# Patient Record
Sex: MALE | Race: WHITE | NOT HISPANIC OR LATINO | Employment: FULL TIME | URBAN - METROPOLITAN AREA
[De-identification: names, ages, dates, MRNs, and addresses within clinical notes are randomized per-mention and may not be internally consistent; named-entity substitution may affect disease eponyms.]

---

## 2017-01-03 ENCOUNTER — TELEPHONE (OUTPATIENT)
Dept: CARDIOLOGY | Facility: CLINIC | Age: 62
End: 2017-01-03

## 2017-01-03 ENCOUNTER — OFFICE VISIT (OUTPATIENT)
Dept: CARDIOLOGY | Facility: CLINIC | Age: 62
End: 2017-01-03
Payer: COMMERCIAL

## 2017-01-03 VITALS
HEART RATE: 70 BPM | BODY MASS INDEX: 32.28 KG/M2 | OXYGEN SATURATION: 96 % | WEIGHT: 238.31 LBS | HEIGHT: 72 IN | DIASTOLIC BLOOD PRESSURE: 68 MMHG | SYSTOLIC BLOOD PRESSURE: 123 MMHG

## 2017-01-03 DIAGNOSIS — I25.10 CORONARY ARTERY DISEASE INVOLVING NATIVE CORONARY ARTERY OF NATIVE HEART WITHOUT ANGINA PECTORIS: Primary | ICD-10-CM

## 2017-01-03 DIAGNOSIS — E66.9 DIABETES MELLITUS TYPE 2 IN OBESE: ICD-10-CM

## 2017-01-03 DIAGNOSIS — E11.69 DIABETES MELLITUS TYPE 2 IN OBESE: ICD-10-CM

## 2017-01-03 DIAGNOSIS — I10 ESSENTIAL HYPERTENSION: ICD-10-CM

## 2017-01-03 DIAGNOSIS — R07.89 CHEST PAIN, ATYPICAL: ICD-10-CM

## 2017-01-03 PROCEDURE — 3074F SYST BP LT 130 MM HG: CPT | Mod: S$GLB,,, | Performed by: INTERNAL MEDICINE

## 2017-01-03 PROCEDURE — 3078F DIAST BP <80 MM HG: CPT | Mod: S$GLB,,, | Performed by: INTERNAL MEDICINE

## 2017-01-03 PROCEDURE — 99999 PR PBB SHADOW E&M-EST. PATIENT-LVL IV: CPT | Mod: PBBFAC,,, | Performed by: INTERNAL MEDICINE

## 2017-01-03 PROCEDURE — 99214 OFFICE O/P EST MOD 30 MIN: CPT | Mod: S$GLB,,, | Performed by: INTERNAL MEDICINE

## 2017-01-03 PROCEDURE — 4010F ACE/ARB THERAPY RXD/TAKEN: CPT | Mod: S$GLB,,, | Performed by: INTERNAL MEDICINE

## 2017-01-03 PROCEDURE — 1159F MED LIST DOCD IN RCRD: CPT | Mod: S$GLB,,, | Performed by: INTERNAL MEDICINE

## 2017-01-03 PROCEDURE — 3044F HG A1C LEVEL LT 7.0%: CPT | Mod: S$GLB,,, | Performed by: INTERNAL MEDICINE

## 2017-01-03 NOTE — PROGRESS NOTES
Subjective:    Patient ID:  Edel Edwards III is a 61 y.o. male who presents for follow-up of Hospital Follow Up      HPI     Recently admitted  HTN, DM, CAD - TESSA to LAD 12/20/16 12/20/16 [facility] - EDP 17, no LV, LAD tandem proximal 90% lesions, Cx distal 70%, RCA medium with diffuse mid 80%        Description of the findings of the procedure: calcified, fibrotic 90%prox LAD --> 0% by predil with 3.5 angioscore and NC balloon. Placement of a 3.5 x 30 mm TESSA.      Stress test 12/13/16  LVEF: 53 %  Impression: ABNORMAL MYOCARDIAL PERFUSION  1. There is evidence for a moderate to large sized area of moderate myocardial ischemia that extends from the base to the apical inferior wall of the left ventricle with underlying injury present.   2. There is abnormal wall motion at rest showing moderate hypokinesis of the inferobasilar wall of the left ventricle.   3. Resting LV function is normal.     Denies CP or SOB  Having some fatigue        Review of Systems   Constitution: Negative for decreased appetite.   HENT: Negative for ear discharge.    Eyes: Negative for blurred vision.   Endocrine: Negative for polyphagia.   Skin: Negative for nail changes.   Neurological: Negative for aphonia.   Psychiatric/Behavioral: Negative for hallucinations.        Objective:    Physical Exam   Constitutional: He is oriented to person, place, and time. He appears well-developed and well-nourished.   HENT:   Head: Normocephalic and atraumatic.   Eyes: Conjunctivae are normal. Pupils are equal, round, and reactive to light.   Neck: Normal range of motion. Neck supple.   Cardiovascular: Normal rate, normal heart sounds and intact distal pulses.    Pulmonary/Chest: Effort normal and breath sounds normal.   Abdominal: Soft. Bowel sounds are normal.   Musculoskeletal: Normal range of motion.   Neurological: He is alert and oriented to person, place, and time.   Skin: Skin is warm and dry.         Assessment:       1. Coronary artery disease  involving native coronary artery of native heart without angina pectoris    2. Diabetes mellitus type 2 in obese    3. Chest pain, atypical    4. Essential hypertension         Plan:       Cardiac rehab  OV 3 months

## 2017-01-03 NOTE — MR AVS SNAPSHOT
[facility]  [address] [place] [address]  Phone: [phone]                  Edel Edwards III   1/3/2017 8:45 AM   Office Visit    Description:  Male : 1955   Provider:  Teja Eaton MD   Department:  [facility]           Reason for Visit     Hospital Follow Up           Diagnoses this Visit        Comments    Coronary artery disease involving native coronary artery of native heart without angina pectoris    -  Primary     Diabetes mellitus type 2 in obese         Chest pain, atypical         Essential hypertension                To Do List           Future Appointments        Provider Department Dept Phone    2017 8:30 AM Henrique Sanchez OD [place] [facility] [phone]    2017 9:00 AM Henrique Sanchez OD [place] [facility] [phone]    2017 3:00 PM FELIBERTO Steiner MD [facility] [phone]      Goals (5 Years of Data)     None      [facility]     [facility] On Call Nurse Care Line -  Assistance  Registered nurses in the [facility] provide clinical advisement, health education, appointment booking, and other advisory services.  Call for this free service at [phone].             Medications           Message regarding Medications     Verify the changes and/or additions to your medication regime listed below are the same as discussed with your clinician today.  If any of these changes or additions are incorrect, please notify your healthcare provider.             Verify that the below list of medications is an accurate representation of the medications you are currently taking.  If none reported, the list may be blank. If incorrect, please contact your healthcare provider. Carry this list with you in case of emergency.           Current Medications     ACCU-CHEK SMARTVIEW TEST STRIP Strp     aspirin (ECOTRIN) 81 MG EC tablet Take 1 tablet (81 mg total) by mouth once daily.    atorvastatin  (LIPITOR) 40 MG tablet take 1/2 tablet by mouth once daily    canagliflozin (INVOKANA) 300 mg Tab Take 300 mg by mouth every morning.     clopidogrel (PLAVIX) 75 mg tablet Take 1 tablet (75 mg total) by mouth once daily. Take 8 pills on the first day    clotrimazole-betamethasone 1-0.05% (LOTRISONE) cream Apply topically 2 (two) times daily.    etodolac (LODINE) 500 MG tablet Take 500 mg by mouth 2 (two) times daily.     exenatide microspheres (BYDUREON) 2 mg SSRR Inject 2 mg into the skin every 7 days. On Wednesdays PM.    FLUARIX QUAD 9669-6525, PF, 60 mcg (15 mcg x 4)/0.5 mL Syrg inject 0.5 milliliter intramuscularly    FOLIC ACID/MV,FE,MIN (CENTRUM ORAL) Take 1 tablet by mouth every morning.     gabapentin (NEURONTIN) 100 MG capsule Take 400 mg by mouth every evening.     GLUC/CHND/OM3/DHA/EPA/FISH/STR (GLUCOSAMINE CHONDROITIN PLUS ORAL) Take 1 tablet by mouth 2 (two) times daily.    hydrocodone-acetaminophen  mg Tab Take 1 tablet by mouth once as needed.     isosorbide mononitrate (IMDUR) 30 MG 24 hr tablet Take 1 tablet (30 mg total) by mouth once daily.    loratadine-pseudoephedrine 5-120 mg (CLARITIN-D 12-HOUR) 5-120 mg per tablet Take 1 tablet by mouth 2 (two) times daily.    metformin (GLUCOPHAGE) 1000 MG tablet Take 1,000 mg by mouth 2 (two) times daily with meals.      metoprolol succinate (TOPROL-XL) 25 MG 24 hr tablet Take 1 tablet (25 mg total) by mouth once daily.    omeprazole (PRILOSEC OTC) 20 MG tablet Take 20 mg by mouth every evening.     PNEUMOVAX 23 25 mcg/0.5 mL Syrg inject 0.5 milliliter intramuscularly    tamsulosin (FLOMAX) 0.4 mg Cp24 Take 0.4 mg by mouth every evening.    terazosin (HYTRIN) 2 MG capsule Take 2 mg by mouth every evening.    valsartan (DIOVAN) 320 MG tablet Take 320 mg by mouth every morning.            Clinical Reference Information           Vital Signs - Last Recorded  Most recent update: 1/3/2017  8:55 AM by Charlotte Griggs MA    BP Pulse Ht Wt SpO2 BMI    123/68  (BP Location: Left arm, Patient Position: Sitting, BP Method: Automatic) 70 6' (1.829 m) 108.1 kg (238 lb 5.1 oz) 96% 32.32 kg/m2      Blood Pressure          Most Recent Value    BP  123/68      Allergies as of 1/3/2017     No Known Allergies      Immunizations Administered on Date of Encounter - 1/3/2017     None

## 2017-01-06 DIAGNOSIS — R52 PAIN: Primary | ICD-10-CM

## 2017-01-06 RX ORDER — ETODOLAC 500 MG/1
500 TABLET, FILM COATED ORAL 2 TIMES DAILY
Qty: 30 TABLET | Refills: 0 | Status: SHIPPED | OUTPATIENT
Start: 2017-01-06 | End: 2017-01-11 | Stop reason: SDUPTHER

## 2017-01-06 NOTE — TELEPHONE ENCOUNTER
----- Message from Maura Barrientos sent at 1/6/2017  8:13 AM CST -----  Contact: self  Pt needs etodolac (LODINE) 500 MG tablet refilled. Please call [phone]. thanks

## 2017-01-11 ENCOUNTER — OFFICE VISIT (OUTPATIENT)
Dept: FAMILY MEDICINE | Facility: CLINIC | Age: 62
End: 2017-01-11
Payer: COMMERCIAL

## 2017-01-11 VITALS
OXYGEN SATURATION: 95 % | WEIGHT: 238.31 LBS | SYSTOLIC BLOOD PRESSURE: 122 MMHG | TEMPERATURE: 98 F | BODY MASS INDEX: 32.28 KG/M2 | HEART RATE: 64 BPM | HEIGHT: 72 IN | DIASTOLIC BLOOD PRESSURE: 70 MMHG | RESPIRATION RATE: 12 BRPM

## 2017-01-11 DIAGNOSIS — I10 ESSENTIAL HYPERTENSION: Primary | ICD-10-CM

## 2017-01-11 DIAGNOSIS — J01.10 ACUTE NON-RECURRENT FRONTAL SINUSITIS: ICD-10-CM

## 2017-01-11 DIAGNOSIS — E11.9 TYPE 2 DIABETES MELLITUS WITHOUT RETINOPATHY: ICD-10-CM

## 2017-01-11 DIAGNOSIS — I25.10 CORONARY ARTERY DISEASE INVOLVING NATIVE CORONARY ARTERY OF NATIVE HEART WITHOUT ANGINA PECTORIS: ICD-10-CM

## 2017-01-11 DIAGNOSIS — R52 PAIN: ICD-10-CM

## 2017-01-11 PROCEDURE — 3078F DIAST BP <80 MM HG: CPT | Mod: S$GLB,,, | Performed by: FAMILY MEDICINE

## 2017-01-11 PROCEDURE — 1159F MED LIST DOCD IN RCRD: CPT | Mod: S$GLB,,, | Performed by: FAMILY MEDICINE

## 2017-01-11 PROCEDURE — 4010F ACE/ARB THERAPY RXD/TAKEN: CPT | Mod: S$GLB,,, | Performed by: FAMILY MEDICINE

## 2017-01-11 PROCEDURE — 3044F HG A1C LEVEL LT 7.0%: CPT | Mod: S$GLB,,, | Performed by: FAMILY MEDICINE

## 2017-01-11 PROCEDURE — 3074F SYST BP LT 130 MM HG: CPT | Mod: S$GLB,,, | Performed by: FAMILY MEDICINE

## 2017-01-11 PROCEDURE — 99999 PR PBB SHADOW E&M-EST. PATIENT-LVL III: CPT | Mod: PBBFAC,,, | Performed by: FAMILY MEDICINE

## 2017-01-11 PROCEDURE — 99214 OFFICE O/P EST MOD 30 MIN: CPT | Mod: S$GLB,,, | Performed by: FAMILY MEDICINE

## 2017-01-11 RX ORDER — ETODOLAC 500 MG/1
500 TABLET, FILM COATED ORAL 2 TIMES DAILY
Qty: 60 TABLET | Refills: 4 | Status: SHIPPED | OUTPATIENT
Start: 2017-01-11 | End: 2017-05-05 | Stop reason: SDUPTHER

## 2017-01-11 RX ORDER — ETODOLAC 500 MG/1
500 TABLET, FILM COATED ORAL 2 TIMES DAILY
Qty: 60 TABLET | Refills: 2 | Status: SHIPPED | OUTPATIENT
Start: 2017-01-11 | End: 2017-01-11 | Stop reason: SDUPTHER

## 2017-01-11 RX ORDER — AZITHROMYCIN 250 MG/1
TABLET, FILM COATED ORAL
Qty: 6 TABLET | Refills: 0 | Status: SHIPPED | OUTPATIENT
Start: 2017-01-11 | End: 2017-01-11

## 2017-01-11 NOTE — LETTER
January 11, 2017      Edel Edwards III  [address] [facility] [place] [address]             Levine, Susan. \[facility].\""  [address] [place] [address]  Phone: [phone]  Fax: [phone] Mr. Edwards    Was treated here on 01/11/2017    May Return to work/school on 01/12/2017.    No Restrictions              Chandana Varela MD

## 2017-01-11 NOTE — PROGRESS NOTES
Chief Complaint   Patient presents with    Sinus Problem     it comes and goes / wife has a sinus       HPI  Edel Edwards III is a 61 y.o. male with multiple medical diagnoses as listed in the medical history and problem list that presents for URI.    URI - Meds: OTC dayquil/nyquil, nasal steroid, 1.5 week hx, states prog worsening then woke up this am improved.     CAD - recent cath multiple stents placed, Cardiology. Overall states no change in symptoms, feels well.     DM2 - states well controlled at this time    Pt is known to me and was last seen by me on 11/28/2016.    PAST MEDICAL HISTORY:  Past Medical History   Diagnosis Date    Anticoagulant long-term use      Plavix and Aspirin    BMI 33.0-33.9,adult     Cataract     Diabetes mellitus type 2 in obese     HTN (hypertension)     Hyperlipidemia     Sciatica        PAST SURGICAL HISTORY:  Past Surgical History   Procedure Laterality Date    None      Joint replacement Left 11/19/2015       SOCIAL HISTORY:  Social History     Social History    Marital status:      Spouse name: N/A    Number of children: N/A    Years of education: N/A     Occupational History    Not on file.     Social History Main Topics    Smoking status: Former Smoker     Quit date: 6/5/2009    Smokeless tobacco: Not on file      Comment: quit 2010    Alcohol use No    Drug use: No    Sexual activity: Yes     Partners: Female     Other Topics Concern    Not on file     Social History Narrative       FAMILY HISTORY:  Family History   Problem Relation Age of Onset    Hypertension Mother     Stroke Father     No Known Problems Sister     No Known Problems Sister     Diabetes Maternal Aunt     Diabetes Paternal Grandmother     No Known Problems Brother     No Known Problems Maternal Uncle     No Known Problems Paternal Aunt     No Known Problems Paternal Uncle     No Known Problems Maternal Grandmother     No Known Problems Maternal Grandfather      No Known Problems Paternal Grandfather     Amblyopia Neg Hx     Blindness Neg Hx     Cancer Neg Hx     Cataracts Neg Hx     Glaucoma Neg Hx     Macular degeneration Neg Hx     Retinal detachment Neg Hx     Strabismus Neg Hx     Thyroid disease Neg Hx        ALLERGIES AND MEDICATIONS: updated and reviewed.  Review of patient's allergies indicates:  No Known Allergies  Current Outpatient Prescriptions   Medication Sig Dispense Refill    ACCU-CHEK SMARTVIEW TEST STRIP Strp   1    aspirin (ECOTRIN) 81 MG EC tablet Take 1 tablet (81 mg total) by mouth once daily.  0    atorvastatin (LIPITOR) 40 MG tablet take 1/2 tablet by mouth once daily 30 tablet 0    canagliflozin (INVOKANA) 300 mg Tab Take 300 mg by mouth every morning.       clopidogrel (PLAVIX) 75 mg tablet Take 1 tablet (75 mg total) by mouth once daily. Take 8 pills on the first day (Patient taking differently: Take 75 mg by mouth every morning. Take 8 pills on the first day) 38 tablet 11    clotrimazole-betamethasone 1-0.05% (LOTRISONE) cream Apply topically 2 (two) times daily. 15 g 5    etodolac (LODINE) 500 MG tablet Take 1 tablet (500 mg total) by mouth 2 (two) times daily. 60 tablet 4    exenatide microspheres (BYDUREON) 2 mg SSRR Inject 2 mg into the skin every 7 days. On Wednesdays PM.      FLUARIX QUAD 3035-6609, PF, 60 mcg (15 mcg x 4)/0.5 mL Syrg inject 0.5 milliliter intramuscularly  0    FOLIC ACID/MV,FE,MIN (CENTRUM ORAL) Take 1 tablet by mouth every morning.       gabapentin (NEURONTIN) 100 MG capsule Take 400 mg by mouth every evening.       GLUC/CHND/OM3/DHA/EPA/FISH/STR (GLUCOSAMINE CHONDROITIN PLUS ORAL) Take 1 tablet by mouth 2 (two) times daily.      hydrocodone-acetaminophen  mg Tab Take 1 tablet by mouth once as needed.   0    isosorbide mononitrate (IMDUR) 30 MG 24 hr tablet Take 1 tablet (30 mg total) by mouth once daily. (Patient taking differently: Take 30 mg by mouth every morning. ) 30 tablet 11     loratadine-pseudoephedrine 5-120 mg (CLARITIN-D 12-HOUR) 5-120 mg per tablet Take 1 tablet by mouth 2 (two) times daily.      metformin (GLUCOPHAGE) 1000 MG tablet Take 1,000 mg by mouth 2 (two) times daily with meals.        metoprolol succinate (TOPROL-XL) 25 MG 24 hr tablet Take 1 tablet (25 mg total) by mouth once daily. (Patient taking differently: Take 25 mg by mouth every morning. ) 30 tablet 11    omeprazole (PRILOSEC OTC) 20 MG tablet Take 20 mg by mouth every evening.       PNEUMOVAX 23 25 mcg/0.5 mL Syrg inject 0.5 milliliter intramuscularly  0    tamsulosin (FLOMAX) 0.4 mg Cp24 Take 0.4 mg by mouth every evening.      terazosin (HYTRIN) 2 MG capsule Take 2 mg by mouth every evening.      valsartan (DIOVAN) 320 MG tablet Take 320 mg by mouth every morning.   1     No current facility-administered medications for this visit.        ROS  Review of Systems   Constitutional: Negative for activity change, appetite change, fatigue and fever.   HENT: Positive for postnasal drip, sinus pressure and sneezing. Negative for congestion, rhinorrhea and sore throat.    Eyes: Positive for itching. Negative for visual disturbance.   Respiratory: Negative for cough, chest tightness, shortness of breath and wheezing.    Cardiovascular: Negative for chest pain.   Gastrointestinal: Negative for abdominal pain, diarrhea, nausea and vomiting.   Endocrine: Negative for polydipsia.   Genitourinary: Negative for dysuria, frequency and urgency.   Musculoskeletal: Negative for back pain, myalgias and neck stiffness.   Skin: Negative for rash.   Neurological: Negative for dizziness, syncope and numbness.   Psychiatric/Behavioral: Negative for agitation and dysphoric mood.       Physical Exam  Vitals:    01/11/17 0808   BP: 122/70   Pulse: 64   Resp: 12   Temp: 98.4 °F (36.9 °C)    Body mass index is 32.32 kg/(m^2).  Weight: 108.1 kg (238 lb 5.1 oz)   Height: 6' (182.9 cm)     Physical Exam   Constitutional: He is oriented to  person, place, and time. He appears well-developed and well-nourished.   HENT:   Head: Normocephalic and atraumatic.   Mouth/Throat: No oropharyngeal exudate.   Erythematous pharynx  Erythematous, edematous nares  Mild dull TMs bilaterally   Eyes: Conjunctivae and EOM are normal. Pupils are equal, round, and reactive to light. No scleral icterus.   Neck: Normal range of motion. Neck supple.   Cardiovascular: Normal rate, regular rhythm and normal heart sounds.    Pulmonary/Chest: Effort normal and breath sounds normal. No respiratory distress.   Abdominal: Soft. Bowel sounds are normal. There is no tenderness.   Musculoskeletal: Normal range of motion.   Lymphadenopathy:     He has cervical adenopathy.   Neurological: He is alert and oriented to person, place, and time. He has normal reflexes.   Skin: Skin is warm and dry. No rash noted.   Psychiatric: He has a normal mood and affect. His behavior is normal. Judgment and thought content normal.       Health Maintenance       Date Due Completion Date    TETANUS VACCINE 8/7/1972 ---    Pneumococcal PPSV23 (Medium Risk) (1) 8/7/1972 ---    Zoster Vaccine 8/7/2014 ---    Eye Exam 1/26/2017 1/26/2016    Hemoglobin A1c 5/30/2017 11/30/2016    Override on 8/2/2016: Done (6.2 [facility])    Override on 4/26/2016: Done (6.2 dr wise)    Foot Exam 8/2/2017 8/2/2016 (Done)    Override on 8/2/2016: Done    Lipid Panel 11/30/2017 11/30/2016    Override on 8/2/2016: Done (lipidtcc 144tg 72hdl 48hdl ldl82)    Colonoscopy 3/3/2026 3/3/2016 (Done)    Override on 3/3/2016: Done (with [facility] w/)          Assessment & Plan    Essential hypertension,Coronary artery disease involving native coronary artery of native heart without angina pectoris  - Continue current medication regimen as prescribed  - Cont follow up with Cardiology as scheduled  - Will monitor current symptoms closely    Type 2 diabetes mellitus without retinopathy - Both Eyes  - Continue current medication  regimen as prescribed    Pain  -     etodolac (LODINE) 500 MG tablet; Take 1 tablet (500 mg total) by mouth 2 (two) times daily.  Dispense: 60 tablet; Refill: 4  - Chronic joint pain, relieved with therapy    Acute non-recurrent frontal sinusitis  - Likely viral at this time, will allow 5 day  - Counseled on hydration and rest  - Return if symptoms persist/worsen        Return in about 4 weeks (around 2/8/2017), or if symptoms worsen or fail to improve.

## 2017-01-11 NOTE — LETTER
January 11, 2017    Edel Edwards III  [address] [facility] [place] [address]             Levine, Susan. \[facility].\""  [address] [place] [address]  Phone: [phone]  Fax: [phone] Dear {MR/MRS/MS/DR:32836} Grace:    ***      If you have any questions or concerns, please don't hesitate to call.    Sincerely,        Chandana Varela MD

## 2017-01-23 ENCOUNTER — TELEPHONE (OUTPATIENT)
Dept: CARDIAC REHAB | Facility: CLINIC | Age: 62
End: 2017-01-23

## 2017-01-27 ENCOUNTER — OFFICE VISIT (OUTPATIENT)
Dept: OPTOMETRY | Facility: CLINIC | Age: 62
End: 2017-01-27
Payer: COMMERCIAL

## 2017-01-27 DIAGNOSIS — E11.9 TYPE 2 DIABETES MELLITUS WITHOUT RETINOPATHY: Primary | ICD-10-CM

## 2017-01-27 DIAGNOSIS — Z46.0 FITTING AND ADJUSTMENT OF SPECTACLES AND CONTACT LENSES: Primary | ICD-10-CM

## 2017-01-27 DIAGNOSIS — H52.13 MYOPIA WITH ASTIGMATISM AND PRESBYOPIA, BILATERAL: ICD-10-CM

## 2017-01-27 DIAGNOSIS — H25.13 NUCLEAR SCLEROSIS, BILATERAL: ICD-10-CM

## 2017-01-27 DIAGNOSIS — Z98.890 HISTORY OF LASER PHOTOCOAGULATION OF RETINA: ICD-10-CM

## 2017-01-27 DIAGNOSIS — H52.4 MYOPIA WITH ASTIGMATISM AND PRESBYOPIA, BILATERAL: ICD-10-CM

## 2017-01-27 DIAGNOSIS — H52.203 MYOPIA WITH ASTIGMATISM AND PRESBYOPIA, BILATERAL: ICD-10-CM

## 2017-01-27 PROCEDURE — 92015 DETERMINE REFRACTIVE STATE: CPT | Mod: S$GLB,,, | Performed by: OPTOMETRIST

## 2017-01-27 PROCEDURE — 92014 COMPRE OPH EXAM EST PT 1/>: CPT | Mod: S$GLB,,, | Performed by: OPTOMETRIST

## 2017-01-27 PROCEDURE — 99999 PR PBB SHADOW E&M-EST. PATIENT-LVL II: CPT | Mod: PBBFAC,,, | Performed by: OPTOMETRIST

## 2017-01-27 PROCEDURE — 92310 CONTACT LENS FITTING OU: CPT | Mod: ,,, | Performed by: OPTOMETRIST

## 2017-01-27 NOTE — MEDICAL/APP STUDENT
PT happy with glasses and CLs.  Has notices slight change in vision OS when [place] the shooting [place] (due to winking OD) but nothing concerning  Does not sleep in Cls, replaces every 6-8wks, uses generic Multipurpose solution  No gtts    Last , over 2 weeks ago  PCP happy with numbers but wants pt to take BG more often  Hemoglobin A1C   Date Value Ref Range Status   11/30/2016 5.8 4.5 - 6.2 % Final     Comment:     According to ADA guidelines, hemoglobin A1C <7.0% represents  optimal control in non-pregnant diabetic patients.  Different  metrics may apply to specific populations.   Standards of Medical Care in Diabetes - 2016.  For the purpose of screening for the presence of diabetes:  <5.7%     Consistent with the absence of diabetes  5.7-6.4%  Consistent with increasing risk for diabetes   (prediabetes)  >or=6.5%  Consistent with diabetes  Currently no consensus exists for use of hemoglobin A1C  for diagnosis of diabetes for children.     06/17/2015 6.1 4.5 - 6.2 % Final   05/01/2013 6.4 (H) 4.8 - 5.6 % Final     Comment:              Increased risk for diabetes: 5.7 - 6.4           Diabetes: >6.4           Glycemic control for adults with diabetes: <7.0       (-) flashes, floaters, diplopia, headaches, transient vision loss  (-) dryness, burning, redness, watering

## 2017-01-27 NOTE — PROGRESS NOTES
HPI     Diabetic eye exam  Hemoglobin A1C       Date                     Value               Ref Range             Status                11/30/2016               5.8                 4.5 - 6.2 %           Final              Comment:    According to ADA guidelines, hemoglobin A1C <7.0% represents  optimal   control in non-pregnant diabetic patients.  Different  metrics may apply   to specific populations.   Standards of Medical Care in Diabetes -   2016.  For the purpose of screening for the presence of diabetes:  <5.7%       Consistent with the absence of diabetes  5.7-6.4%  Consistent with   increasing risk for diabetes   (prediabetes)  >or=6.5%  Consistent with   diabetes  Currently no consensus exists for use of hemoglobin A1C  for   diagnosis of diabetes for children.         06/17/2015               6.1                 4.5 - 6.2 %           Final                 05/01/2013               6.4 (H)             4.8 - 5.6 %           Final              Comment:             Increased risk for diabetes: 5.7 - 6.4           Diabetes:   >6.4           Glycemic control for adults with diabetes: <7.0  ----------       Last edited by Henrique Sanchez, OD on 1/27/2017  9:34 AM.     ROS     Negative for: Constitutional, Gastrointestinal, Neurological, Skin,   Genitourinary, Musculoskeletal, HENT, Endocrine, Cardiovascular, Eyes,   Respiratory, Psychiatric, Allergic/Imm, Heme/Lymph    Last edited by Henrique Sanchez, OD on 1/27/2017  9:34 AM. (History)        Assessment /Plan     For exam results, see Encounter Report.    Type 2 diabetes mellitus without retinopathy - Both Eyes    Nuclear sclerosis, bilateral    History of laser photocoagulation of retina - Left Eye    Myopia with astigmatism and presbyopia, bilateral      1. No diabetic retinopathy, no csme. Return in 1 year for dilated eye exam.  2. Educated pt on presence of cataracts and effects on vision. No surgery at this time. Recheck in one year.  3. Monitor  condition. Patient to report any changes. RTC 1 year recheck.       4. Spec Rx given and  Contact lens trials ordered for pt. Daily wear only advised, with education to risks of extended wear.  Discussed lens care, compliance and solutions . Different lens options discussed with patient. RTC 1 year full exam.

## 2017-02-07 ENCOUNTER — PATIENT MESSAGE (OUTPATIENT)
Dept: CARDIOLOGY | Facility: CLINIC | Age: 62
End: 2017-02-07

## 2017-02-08 ENCOUNTER — PATIENT MESSAGE (OUTPATIENT)
Dept: CARDIOLOGY | Facility: CLINIC | Age: 62
End: 2017-02-08

## 2017-02-08 ENCOUNTER — OFFICE VISIT (OUTPATIENT)
Dept: CARDIOLOGY | Facility: CLINIC | Age: 62
End: 2017-02-08
Payer: COMMERCIAL

## 2017-02-08 VITALS
WEIGHT: 236.31 LBS | HEIGHT: 72 IN | BODY MASS INDEX: 32.01 KG/M2 | SYSTOLIC BLOOD PRESSURE: 154 MMHG | HEART RATE: 70 BPM | DIASTOLIC BLOOD PRESSURE: 73 MMHG | OXYGEN SATURATION: 97 %

## 2017-02-08 DIAGNOSIS — I10 ESSENTIAL HYPERTENSION: ICD-10-CM

## 2017-02-08 DIAGNOSIS — I25.10 CORONARY ARTERY DISEASE INVOLVING NATIVE CORONARY ARTERY OF NATIVE HEART WITHOUT ANGINA PECTORIS: ICD-10-CM

## 2017-02-08 DIAGNOSIS — E78.5 HYPERLIPIDEMIA, UNSPECIFIED HYPERLIPIDEMIA TYPE: ICD-10-CM

## 2017-02-08 DIAGNOSIS — R07.89 CHEST PAIN, ATYPICAL: Primary | ICD-10-CM

## 2017-02-08 PROCEDURE — 99999 PR PBB SHADOW E&M-EST. PATIENT-LVL IV: CPT | Mod: PBBFAC,,, | Performed by: INTERNAL MEDICINE

## 2017-02-08 PROCEDURE — 3078F DIAST BP <80 MM HG: CPT | Mod: S$GLB,,, | Performed by: INTERNAL MEDICINE

## 2017-02-08 PROCEDURE — 3077F SYST BP >= 140 MM HG: CPT | Mod: S$GLB,,, | Performed by: INTERNAL MEDICINE

## 2017-02-08 PROCEDURE — 99214 OFFICE O/P EST MOD 30 MIN: CPT | Mod: S$GLB,,, | Performed by: INTERNAL MEDICINE

## 2017-02-08 NOTE — PROGRESS NOTES
Subjective:    Patient ID:  Edel Edwards III is a 61 y.o. male who presents for follow-up of Coronary Artery Disease      HPI     Recently admitted  HTN, DM, CAD - TESSA to LAD 12/20/16 12/20/16 [facility] - EDP 17, no LV, LAD tandem proximal 90% lesions, Cx distal 70%, RCA medium with diffuse mid 80%          Description of the findings of the procedure: calcified, fibrotic 90%prox LAD --> 0% by predil with 3.5 angioscore and NC balloon. Placement of a 3.5 x 30 mm TESSA.      Stress test 12/13/16  LVEF: 53 %  Impression: ABNORMAL MYOCARDIAL PERFUSION  1. There is evidence for a moderate to large sized area of moderate myocardial ischemia that extends from the base to the apical inferior wall of the left ventricle with underlying injury present.   2. There is abnormal wall motion at rest showing moderate hypokinesis of the inferobasilar wall of the left ventricle.   3. Resting LV function is normal.     Continues to have significant fatigue    EKG NSR ok    Review of Systems   Constitution: Negative for decreased appetite.   HENT: Negative for ear discharge.    Eyes: Negative for blurred vision.   Endocrine: Negative for polyphagia.   Skin: Negative for nail changes.   Neurological: Negative for aphonia.   Psychiatric/Behavioral: Negative for hallucinations.        Objective:    Physical Exam   Constitutional: He is oriented to person, place, and time. He appears well-developed and well-nourished.   HENT:   Head: Normocephalic and atraumatic.   Eyes: Conjunctivae are normal. Pupils are equal, round, and reactive to light.   Neck: Normal range of motion. Neck supple.   Cardiovascular: Normal rate, normal heart sounds and intact distal pulses.    Pulmonary/Chest: Effort normal and breath sounds normal.   Abdominal: Soft. Bowel sounds are normal.   Musculoskeletal: Normal range of motion.   Neurological: He is alert and oriented to person, place, and time.   Skin: Skin is warm and dry.         Assessment:       1. Chest  pain, atypical    2. Coronary artery disease involving native coronary artery of native heart without angina pectoris    3. Essential hypertension    4. Hyperlipidemia, unspecified hyperlipidemia type         Plan:       Unclear if fatigue is from ongoing ischemia - does have Cx and RCA disease that has been treated medically  CBC, CMP, exercise myoview

## 2017-02-08 NOTE — MR AVS SNAPSHOT
[facility]  [address] [place] [address]  Phone: [phone]                  Edel Edwards III   2017 3:15 PM   Office Visit    Description:  Male : 1955   Provider:  Teja Eaton MD   Department:  [facility]           Reason for Visit     Coronary Artery Disease           Diagnoses this Visit        Comments    Chest pain, atypical    -  Primary     Coronary artery disease involving native coronary artery of native heart without angina pectoris         Essential hypertension         Hyperlipidemia, unspecified hyperlipidemia type                To Do List           Future Appointments        Provider Department Dept Phone    2017 3:15 PM Teja Eaton MD [facility] [phone]    2017 8:45 AM Teja Eaton MD [facility] [phone]    2017 3:00 PM FELIBERTO Steiner MD [facility] [phone]      Goals (5 Years of Data)     None      [facility] On [facility]     [facility] Line -  Assistance  Registered nurses in the [facility] provide clinical advisement, health education, appointment booking, and other advisory services.  Call for this free service at [phone].             Medications           Message regarding Medications     Verify the changes and/or additions to your medication regime listed below are the same as discussed with your clinician today.  If any of these changes or additions are incorrect, please notify your healthcare provider.             Verify that the below list of medications is an accurate representation of the medications you are currently taking.  If none reported, the list may be blank. If incorrect, please contact your healthcare provider. Carry this list with you in case of emergency.           Current Medications     ACCU-CHEK SMARTVIEW TEST STRIP Strp     aspirin (ECOTRIN) 81 MG EC tablet Take 1 tablet (81 mg total) by mouth once daily.     atorvastatin (LIPITOR) 40 MG tablet take 1/2 tablet by mouth once daily    canagliflozin (INVOKANA) 300 mg Tab Take 300 mg by mouth every morning.     clopidogrel (PLAVIX) 75 mg tablet Take 1 tablet (75 mg total) by mouth once daily. Take 8 pills on the first day    clotrimazole-betamethasone 1-0.05% (LOTRISONE) cream Apply topically 2 (two) times daily.    etodolac (LODINE) 500 MG tablet Take 1 tablet (500 mg total) by mouth 2 (two) times daily.    exenatide microspheres (BYDUREON) 2 mg SSRR Inject 2 mg into the skin every 7 days. On Wednesdays PM.    FLUARIX QUAD [id number], PF, 60 mcg (15 mcg x 4)/0.5 mL Syrg inject 0.5 milliliter intramuscularly    FOLIC ACID/MV,FE,MIN (CENTRUM ORAL) Take 1 tablet by mouth every morning.     gabapentin (NEURONTIN) 100 MG capsule Take 400 mg by mouth every evening.     GLUC/CHND/OM3/DHA/EPA/FISH/STR (GLUCOSAMINE CHONDROITIN PLUS ORAL) Take 1 tablet by mouth 2 (two) times daily.    hydrocodone-acetaminophen  mg Tab Take 1 tablet by mouth once as needed.     isosorbide mononitrate (IMDUR) 30 MG 24 hr tablet Take 1 tablet (30 mg total) by mouth once daily.    loratadine-pseudoephedrine 5-120 mg (CLARITIN-D 12-HOUR) 5-120 mg per tablet Take 1 tablet by mouth 2 (two) times daily.    metformin (GLUCOPHAGE) 1000 MG tablet Take 1,000 mg by mouth 2 (two) times daily with meals.      metoprolol succinate (TOPROL-XL) 25 MG 24 hr tablet Take 1 tablet (25 mg total) by mouth once daily.    omeprazole (PRILOSEC OTC) 20 MG tablet Take 20 mg by mouth every evening.     PNEUMOVAX 23 25 mcg/0.5 mL Syrg inject 0.5 milliliter intramuscularly    tamsulosin (FLOMAX) 0.4 mg Cp24 Take 0.4 mg by mouth every evening.    terazosin (HYTRIN) 2 MG capsule Take 2 mg by mouth every evening.    valsartan (DIOVAN) 320 MG tablet Take 320 mg by mouth every morning.            Clinical Reference Information           Your Vitals Were     BP Pulse Height Weight SpO2 BMI    154/73 (BP Location: Left arm,  Patient Position: Sitting, BP Method: Automatic) 70 6' (1.829 m) 107.2 kg (236 lb 5.3 oz) 97% 32.05 kg/m2      Blood Pressure          Most Recent Value    BP  (!)  154/73      Allergies as of 2/8/2017     No Known Allergies      Immunizations Administered on Date of Encounter - 2/8/2017     None      Language Assistance Services     ATTENTION: Language assistance services are available, free of charge. Please call [phone].      ATENCIÓN: Si habla patricia, tiene a coyle disposición servicios gratuitos de asistencia lingüística. Llame al [phone].     CHÚ Ý: N?u b?n nói Ti?ng Vi?t, có các d?ch v? h? tr? ngôn ng? mi?n phí dành cho b?n. G?i s? [phone].         [facility] complies with applicable Federal civil rights laws and does not discriminate on the basis of race, color, national origin, age, disability, or sex.

## 2017-02-11 ENCOUNTER — PATIENT MESSAGE (OUTPATIENT)
Dept: CARDIOLOGY | Facility: CLINIC | Age: 62
End: 2017-02-11

## 2017-02-14 ENCOUNTER — HOSPITAL ENCOUNTER (OUTPATIENT)
Dept: CARDIOLOGY | Facility: HOSPITAL | Age: 62
Discharge: HOME | End: 2017-02-14
Attending: INTERNAL MEDICINE
Payer: COMMERCIAL

## 2017-02-14 ENCOUNTER — HOSPITAL ENCOUNTER (OUTPATIENT)
Dept: RADIOLOGY | Facility: HOSPITAL | Age: 62
Discharge: HOME | End: 2017-02-14
Attending: INTERNAL MEDICINE
Payer: COMMERCIAL

## 2017-02-14 DIAGNOSIS — R07.89 CHEST PAIN, ATYPICAL: ICD-10-CM

## 2017-02-14 DIAGNOSIS — E78.5 HYPERLIPIDEMIA, UNSPECIFIED HYPERLIPIDEMIA TYPE: ICD-10-CM

## 2017-02-14 DIAGNOSIS — I25.10 CORONARY ARTERY DISEASE INVOLVING NATIVE CORONARY ARTERY OF NATIVE HEART WITHOUT ANGINA PECTORIS: ICD-10-CM

## 2017-02-14 DIAGNOSIS — I10 ESSENTIAL HYPERTENSION: ICD-10-CM

## 2017-02-14 LAB — DIASTOLIC DYSFUNCTION: NO

## 2017-02-14 PROCEDURE — 93017 CV STRESS TEST TRACING ONLY: CPT

## 2017-02-14 PROCEDURE — 78452 HT MUSCLE IMAGE SPECT MULT: CPT | Mod: TC

## 2017-02-14 PROCEDURE — 93018 CV STRESS TEST I&R ONLY: CPT | Mod: ,,, | Performed by: INTERNAL MEDICINE

## 2017-02-14 PROCEDURE — 78452 HT MUSCLE IMAGE SPECT MULT: CPT | Mod: 26,,, | Performed by: INTERNAL MEDICINE

## 2017-02-14 PROCEDURE — 93016 CV STRESS TEST SUPVJ ONLY: CPT | Mod: ,,, | Performed by: INTERNAL MEDICINE

## 2017-02-15 ENCOUNTER — OFFICE VISIT (OUTPATIENT)
Dept: CARDIOLOGY | Facility: CLINIC | Age: 62
End: 2017-02-15
Payer: COMMERCIAL

## 2017-02-15 VITALS
HEIGHT: 63 IN | BODY MASS INDEX: 42.52 KG/M2 | WEIGHT: 240 LBS | OXYGEN SATURATION: 96 % | HEART RATE: 76 BPM | DIASTOLIC BLOOD PRESSURE: 73 MMHG | SYSTOLIC BLOOD PRESSURE: 144 MMHG

## 2017-02-15 DIAGNOSIS — E11.69 DIABETES MELLITUS TYPE 2 IN OBESE: ICD-10-CM

## 2017-02-15 DIAGNOSIS — E78.5 HYPERLIPIDEMIA, UNSPECIFIED HYPERLIPIDEMIA TYPE: ICD-10-CM

## 2017-02-15 DIAGNOSIS — I10 ESSENTIAL HYPERTENSION: ICD-10-CM

## 2017-02-15 DIAGNOSIS — E66.9 DIABETES MELLITUS TYPE 2 IN OBESE: ICD-10-CM

## 2017-02-15 DIAGNOSIS — I25.10 CORONARY ARTERY DISEASE INVOLVING NATIVE CORONARY ARTERY OF NATIVE HEART WITHOUT ANGINA PECTORIS: ICD-10-CM

## 2017-02-15 DIAGNOSIS — R07.89 CHEST PAIN, ATYPICAL: Primary | ICD-10-CM

## 2017-02-15 PROCEDURE — 99999 PR PBB SHADOW E&M-EST. PATIENT-LVL III: CPT | Mod: PBBFAC,,, | Performed by: INTERNAL MEDICINE

## 2017-02-15 PROCEDURE — 3078F DIAST BP <80 MM HG: CPT | Mod: S$GLB,,, | Performed by: INTERNAL MEDICINE

## 2017-02-15 PROCEDURE — 3077F SYST BP >= 140 MM HG: CPT | Mod: S$GLB,,, | Performed by: INTERNAL MEDICINE

## 2017-02-15 PROCEDURE — 99214 OFFICE O/P EST MOD 30 MIN: CPT | Mod: S$GLB,,, | Performed by: INTERNAL MEDICINE

## 2017-02-15 PROCEDURE — 3044F HG A1C LEVEL LT 7.0%: CPT | Mod: S$GLB,,, | Performed by: INTERNAL MEDICINE

## 2017-02-15 PROCEDURE — 4010F ACE/ARB THERAPY RXD/TAKEN: CPT | Mod: S$GLB,,, | Performed by: INTERNAL MEDICINE

## 2017-02-15 RX ORDER — DULAGLUTIDE 0.75 MG/.5ML
INJECTION, SOLUTION SUBCUTANEOUS
Refills: 0 | COMMUNITY
Start: 2017-02-08 | End: 2020-03-13

## 2017-02-15 NOTE — MR AVS SNAPSHOT
[facility]  [address] [place] [address]  Phone: [phone]                  Edel Edwards III   2/15/2017 8:45 AM   Office Visit    Description:  Male : 1955   Provider:  Teja Eaton MD   Department:  [facility]           Reason for Visit     Results           Diagnoses this Visit        Comments    Chest pain, atypical    -  Primary     Essential hypertension         Coronary artery disease involving native coronary artery of native heart without angina pectoris         Diabetes mellitus type 2 in obese         Hyperlipidemia, unspecified hyperlipidemia type                To Do List           Future Appointments        Provider Department Dept Phone    2017 3:00 PM FELIBERTO Steiner MD [facility] [phone]      Goals (5 Years of Data)     None      [facility]     [facility] -  Assistance  Registered nurses in the [facility] provide clinical advisement, health education, appointment booking, and other advisory services.  Call for this free service at [phone].             Medications           Message regarding Medications     Verify the changes and/or additions to your medication regime listed below are the same as discussed with your clinician today.  If any of these changes or additions are incorrect, please notify your healthcare provider.             Verify that the below list of medications is an accurate representation of the medications you are currently taking.  If none reported, the list may be blank. If incorrect, please contact your healthcare provider. Carry this list with you in case of emergency.           Current Medications     ACCU-CHEK SMARTVIEW TEST STRIP Strp     aspirin (ECOTRIN) 81 MG EC tablet Take 1 tablet (81 mg total) by mouth once daily.    atorvastatin (LIPITOR) 40 MG tablet take 1/2 tablet by mouth once daily    canagliflozin (INVOKANA) 300 mg Tab Take 300 mg  "by mouth every morning.     clopidogrel (PLAVIX) 75 mg tablet Take 1 tablet (75 mg total) by mouth once daily. Take 8 pills on the first day    clotrimazole-betamethasone 1-0.05% (LOTRISONE) cream Apply topically 2 (two) times daily.    etodolac (LODINE) 500 MG tablet Take 1 tablet (500 mg total) by mouth 2 (two) times daily.    exenatide microspheres (BYDUREON) 2 mg SSRR Inject 2 mg into the skin every 7 days. On Wednesdays PM.    FLUARIX QUAD [id number], PF, 60 mcg (15 mcg x 4)/0.5 mL Syrg inject 0.5 milliliter intramuscularly    FOLIC ACID/MV,FE,MIN (CENTRUM ORAL) Take 1 tablet by mouth every morning.     gabapentin (NEURONTIN) 100 MG capsule Take 400 mg by mouth every evening.     GLUC/CHND/OM3/DHA/EPA/FISH/STR (GLUCOSAMINE CHONDROITIN PLUS ORAL) Take 1 tablet by mouth 2 (two) times daily.    hydrocodone-acetaminophen  mg Tab Take 1 tablet by mouth once as needed.     isosorbide mononitrate (IMDUR) 30 MG 24 hr tablet Take 1 tablet (30 mg total) by mouth once daily.    loratadine-pseudoephedrine 5-120 mg (CLARITIN-D 12-HOUR) 5-120 mg per tablet Take 1 tablet by mouth 2 (two) times daily.    metformin (GLUCOPHAGE) 1000 MG tablet Take 1,000 mg by mouth 2 (two) times daily with meals.      metoprolol succinate (TOPROL-XL) 25 MG 24 hr tablet Take 1 tablet (25 mg total) by mouth once daily.    omeprazole (PRILOSEC OTC) 20 MG tablet Take 20 mg by mouth every evening.     PNEUMOVAX 23 25 mcg/0.5 mL Syrg inject 0.5 milliliter intramuscularly    tamsulosin (FLOMAX) 0.4 mg Cp24 Take 0.4 mg by mouth every evening.    terazosin (HYTRIN) 2 MG capsule Take 2 mg by mouth every evening.    TRULICITY 0.75 mg/0.5 mL PnIj     valsartan (DIOVAN) 320 MG tablet Take 320 mg by mouth every morning.            Clinical Reference Information           Your Vitals Were     BP Pulse Height Weight SpO2 BMI    144/73 (BP Location: Left arm, Patient Position: Sitting, BP Method: Automatic) 76 5' 3" (1.6 m) 108.9 kg (240 lb) 96% 42.51 " kg/m2      Blood Pressure          Most Recent Value    BP  (!)  144/73      Allergies as of 2/15/2017     No Known Allergies      Immunizations Administered on Date of Encounter - 2/15/2017     None      Language Assistance Services     ATTENTION: Language assistance services are available, free of charge. Please call [phone].      ATENCIÓN: Si habkenneth gomez, tiene a coyle disposición servicios gratuitos de asistencia lingüística. Llame al [phone].     CHÚ Ý: N?u b?n nói Ti?ng Vi?t, có các d?ch v? h? tr? ngôn ng? mi?n phí dành cho b?n. G?i s? [phone].         [facility] complies with applicable Federal civil rights laws and does not discriminate on the basis of race, color, national origin, age, disability, or sex.

## 2017-02-15 NOTE — PROGRESS NOTES
Subjective:    Patient ID:  Edel Edwards III is a 61 y.o. male who presents for follow-up of Results      HPI        Recently admitted  HTN, DM, CAD - TESSA to LAD 12/20/16 12/20/16 [facility] - EDP 17, no LV, LAD tandem proximal 90% lesions, Cx distal 70%, RCA medium with diffuse mid 80%          Description of the findings of the procedure: calcified, fibrotic 90%prox LAD --> 0% by predil with 3.5 angioscore and NC balloon. Placement of a 3.5 x 30 mm TESSA.      He continued to have mostly fatigue symptoms following stent    Stress test 2/14/17  LVEF: 59 %  Impression: NORMAL MYOCARDIAL PERFUSION  1. The perfusion scan is free of evidence for myocardial ischemia or injury.   2. There is a mild to moderate intensity fixed defect in the inferior wall of the left ventricle, secondary to diaphragm attenuation.   3. Resting wall motion is physiologic.   4. Resting LV function is normal.   5. The ventricular volumes are normal at rest and stress.   6. The extracardiac distribution of radioactivity is normal.   7. When compared to the previous study from 12/13/2016, inferior defect appears mostly artifiact now.    Inferior defect appears mostly reversible on my review    Feeling better - fatigue improved - denies CP or SOB    Review of Systems   Constitution: Negative for decreased appetite.   HENT: Negative for ear discharge.    Eyes: Negative for blurred vision.   Endocrine: Negative for polyphagia.   Skin: Negative for nail changes.   Neurological: Negative for aphonia.   Psychiatric/Behavioral: Negative for hallucinations.        Objective:    Physical Exam   Constitutional: He is oriented to person, place, and time. He appears well-developed and well-nourished.   HENT:   Head: Normocephalic and atraumatic.   Eyes: Conjunctivae are normal. Pupils are equal, round, and reactive to light.   Neck: Normal range of motion. Neck supple.   Cardiovascular: Normal rate, normal heart sounds and intact distal pulses.     Pulmonary/Chest: Effort normal and breath sounds normal.   Abdominal: Soft. Bowel sounds are normal.   Musculoskeletal: Normal range of motion.   Neurological: He is alert and oriented to person, place, and time.   Skin: Skin is warm and dry.         Assessment:       1. Chest pain, atypical    2. Essential hypertension    3. Coronary artery disease involving native coronary artery of native heart without angina pectoris    4. Diabetes mellitus type 2 in obese    5. Hyperlipidemia, unspecified hyperlipidemia type         Plan:       Reviewed cath film and stress test with Dr Fried - could be a candidate for PCI of RCA although this is a medium sized diffusely diseased vessel. Given improved symptoms will continue to treat medically for now  OV 3 months

## 2017-02-20 ENCOUNTER — TELEPHONE (OUTPATIENT)
Dept: CARDIAC REHAB | Facility: CLINIC | Age: 62
End: 2017-02-20

## 2017-02-20 DIAGNOSIS — I25.10 CORONARY ATHEROSCLEROSIS OF UNSPECIFIED TYPE OF VESSEL, NATIVE OR GRAFT: ICD-10-CM

## 2017-02-20 DIAGNOSIS — Z98.61 POSTSURGICAL PERCUTANEOUS TRANSLUMINAL CORONARY ANGIOPLASTY STATUS: Primary | ICD-10-CM

## 2017-03-27 ENCOUNTER — HOSPITAL ENCOUNTER (OUTPATIENT)
Dept: CARDIOLOGY | Facility: CLINIC | Age: 62
Discharge: HOME | End: 2017-03-27
Payer: COMMERCIAL

## 2017-03-27 DIAGNOSIS — Z98.61 POSTSURGICAL PERCUTANEOUS TRANSLUMINAL CORONARY ANGIOPLASTY STATUS: ICD-10-CM

## 2017-03-27 DIAGNOSIS — I25.10 CORONARY ATHEROSCLEROSIS OF UNSPECIFIED TYPE OF VESSEL, NATIVE OR GRAFT: ICD-10-CM

## 2017-03-27 LAB — DIASTOLIC DYSFUNCTION: NO

## 2017-03-27 PROCEDURE — 94621 CARDIOPULM EXERCISE TESTING: CPT | Mod: S$GLB,,, | Performed by: INTERNAL MEDICINE

## 2017-03-28 ENCOUNTER — CLINICAL SUPPORT (OUTPATIENT)
Dept: CARDIAC REHAB | Facility: CLINIC | Age: 62
End: 2017-03-28
Payer: COMMERCIAL

## 2017-03-28 VITALS
RESPIRATION RATE: 18 BRPM | SYSTOLIC BLOOD PRESSURE: 128 MMHG | HEART RATE: 60 BPM | DIASTOLIC BLOOD PRESSURE: 66 MMHG | OXYGEN SATURATION: 96 %

## 2017-03-28 DIAGNOSIS — I25.10 CORONARY ARTERY DISEASE INVOLVING NATIVE CORONARY ARTERY OF NATIVE HEART WITHOUT ANGINA PECTORIS: ICD-10-CM

## 2017-03-28 DIAGNOSIS — E11.69 DIABETES MELLITUS TYPE 2 IN OBESE: ICD-10-CM

## 2017-03-28 DIAGNOSIS — I10 ESSENTIAL HYPERTENSION: ICD-10-CM

## 2017-03-28 DIAGNOSIS — Z98.61 POST PTCA: ICD-10-CM

## 2017-03-28 DIAGNOSIS — E66.9 DIABETES MELLITUS TYPE 2 IN OBESE: ICD-10-CM

## 2017-03-28 DIAGNOSIS — R07.89 CHEST PAIN, ATYPICAL: ICD-10-CM

## 2017-03-28 PROCEDURE — 99999 PR PBB SHADOW E&M-EST. PATIENT-LVL II: CPT | Mod: PBBFAC,,,

## 2017-03-28 PROCEDURE — 93798 PHYS/QHP OP CAR RHAB W/ECG: CPT | Mod: S$GLB,,, | Performed by: INTERNAL MEDICINE

## 2017-03-28 NOTE — PROGRESS NOTES
Exercise:  Met with the patient for orientation.  Consent forms were signed, entry CPX test results were reviewed, proper attire and shoes were discussed, and strength assessment was performed.         Entry CPX test results included weight (236 lb), abdominal girth (46.5 in), BMI (32.0), and body composition (16.5%).  An estimated MET Level of 9.0 and a Peak VO2 of 23.0ml/kg/min (6.6 actual METS) were measured.  This places the patient in the moderate risk category.  Upon exit CPX an estimated MET Level of 11.7 will be desired to achieve the goal of a 30% improvement.     Based on entry CPX test, the target heart rate range for patient is 101 to 114 bpm.  The patient will attend cardiac rehab class 3 times per week which will include both aerobic and resistance training which will be modified to fit limitations.  Aerobic exercise will be 30-60 minutes with a goal intensity of 12-15 on the RPE scale.  Resistance training will incorporate free weights 1-2 sets, 10-15 repetitions with a beginning weight of 5 to 8 pounds based on strength assessment.    There were no limitations noted by patient.  The patient stated they are currently not exercising.  Patient was encouraged to begin exercising aerobically and to prepare to exercise at least two additional days per week outside of attending cardiac rehab class for a minimum of 30 minutes.  The patient stated understanding.      The patient will begin Cardiac Rehab on Monday, April 3 at 7:00am.    Exercise prescription will be adjusted based on tolerance of exercise intensity by patient.    Zina Venegas., CEP

## 2017-03-28 NOTE — PROGRESS NOTES
Patient here for Phase II Cardiac Rehab orientation.      Discussed QOL, Risk factors & goals with patient.  Head to toe nursing assessment done.  Vital signs obtained.  No edema noted bilaterally.  Discussed pre/post exercise blood glucose readings.  Patient has been instructed to bring glucometer with him.  He states that he performs weekly foot inspections.  No blisters or sores noted.    QOL:  Discussed Bang & SF36 Questionnaire scores with patient.  Patient denies having any overwhelming stress.  He reports to have situational stress with work.  He has been instructed to notify staff in the event that circumstances change.  He verbalizes understanding.      RISK FACTORS:  The following risk factors are present:  diabetes, hyperlipidemia, hypertension, obesity, positive family history, sedentary lifestyle, stress    GOALS:  Patient has set the following goals:  Increase exercise tolerance  Decrease blood pressure  Weight loss  ID & manage personal areas of stress    Discussed Cardiac Rehab program in depth with patient.  Medication list updated per patient & marked as reviewed.  Patient has been instructed to notify staff of any problems while attending rehab (ie: chest pain, shortness of breath, lightheadedness, dizziness).  Patient has been instructed to monitor blood pressure readings outside of rehab & to keep a daily log of the readings.  Patient verbalizes understanding.    Bertha Paniagua RN  Cardiac Rehab Nurse

## 2017-03-28 NOTE — PROGRESS NOTES
Met with Edel Edwards III for orientation to Phase II cardiac rehab.      Weight: 236 lbs  BMI:  32  Abdominal girth: 46.5 inches  Body fat: 16.5%    Pt would like to decrease abdominal girth and weight < 220 lbs.    Labs reviewed with patient. Patient confirms he is taking Atorvastatin 20 mg for cholesterol control.    Lab Results   Component Value Date    CHOL 158 03/27/2017     Lab Results   Component Value Date    HDL 41 03/27/2017     Lab Results   Component Value Date    LDLCALC 93.2 03/27/2017     Lab Results   Component Value Date    TRIG 119 03/27/2017     Lab Results   Component Value Date    CHOLHDL 25.9 03/27/2017         Lab Results   Component Value Date    GLUF 135 (H) 03/27/2017     Lab Results   Component Value Date    HGBA1C 6.0 03/27/2017       Vitamins/supplements: MVI (Centrum Silver), Glucosamine, Vitamin C    History of diabetes for 15 years and is followed by Dr. Eddy.  Diabetic medications currently taking include Metformin, Farxiga and Trulicity.  Patient has a home glucometer and checks his glucose 2x/week.  Reports typical morning glucose ranges between 112-160 mg/dL.  Patient verbalizes understanding to bring home glucometer and check glucose pre and post each exercise session.  Per cardiac rehab protocols, patient's glucose must be between 90 and 270 mg/dL to exercise.  Patient denies any recent glucose levels less than 60 mg/dL or greater than 300 mg/dL.  Patient aware of signs and symptoms of hypoglycemia.  Patient verbalizes importance of notifying rehab staff if symptoms of hypoglycemia occur while at cardiac rehab.      Patient eats 2 meals daily.  Wife prepares meals at home.  Seasons food with [employer].  Denies use of a salt shaker at the table on prepared foods. Dines out 5 meals (lunch) per week at seafood restaurants. Never chooses fried foods.  Chooses fish 2 times per week. Patient willing to increase fish intake (non-fried varieties) to a goal of 2-3 servings per  week.  Beverages include iced tea, coffee, crystal lite.  Alcohol: none.    24 hr diet recall:  B: coffee  L: boiled crawfish, boiled potatoes, iced tea  D: maxwell greens, mashed potatoes, short ribs, salad    3-day food record given to patient to complete and return for nutritional assessment.  Will follow Edel Edwards III while enrolled in Phase II cardiac rehab and encourage compliance to 2 gram sodium, Mediterranean style eating.      Clarisa Humphries MS, RD, LDN

## 2017-03-29 ENCOUNTER — TELEPHONE (OUTPATIENT)
Dept: OPHTHALMOLOGY | Facility: CLINIC | Age: 62
End: 2017-03-29

## 2017-03-30 ENCOUNTER — OFFICE VISIT (OUTPATIENT)
Dept: OPTOMETRY | Facility: CLINIC | Age: 62
End: 2017-03-30
Payer: COMMERCIAL

## 2017-03-30 DIAGNOSIS — H04.123 BILATERAL DRY EYES: Primary | ICD-10-CM

## 2017-03-30 PROCEDURE — 92012 INTRM OPH EXAM EST PATIENT: CPT | Mod: S$GLB,,, | Performed by: OPTOMETRIST

## 2017-03-30 PROCEDURE — 99999 PR PBB SHADOW E&M-EST. PATIENT-LVL II: CPT | Mod: PBBFAC,,, | Performed by: OPTOMETRIST

## 2017-03-30 NOTE — PROGRESS NOTES
HPI     Pt has been wearing his current pair of contact lens for about 2-3 weeks,   a few days ago pt notices while looking in the distance out of OS that   vision seemed blurry, pt noticed it a few days later and decided to put in   his older prescribed OS lens and things in the distance appeared  Clearer  Pt states that he is most certain he did not mix up OD and OS   Pt has his newer OS lens in a case with him today and is wearing older   lens in OS at time of visit        Last edited by Federica Renteria on 3/30/2017  8:47 AM.     ROS     Negative for: Constitutional, Gastrointestinal, Neurological, Skin,   Genitourinary, Musculoskeletal, HENT, Endocrine, Cardiovascular, Eyes,   Respiratory, Psychiatric, Allergic/Imm, Heme/Lymph    Last edited by Henrique Sanchez, OD on 3/30/2017  9:11 AM. (History)        Assessment /Plan     For exam results, see Encounter Report.    Bilateral dry eyes      1. Episodic blur OS mainly, now clear , all pupils and eom's and 90d exam normal. 20/20 today. Probably dryness vs defective lens. RTC today after trying new lens at home, can come back for DFE and further workup if necessary.

## 2017-04-03 ENCOUNTER — CLINICAL SUPPORT (OUTPATIENT)
Dept: CARDIAC REHAB | Facility: CLINIC | Age: 62
End: 2017-04-03
Payer: COMMERCIAL

## 2017-04-03 DIAGNOSIS — I25.10 CORONARY ATHEROSCLEROSIS OF UNSPECIFIED TYPE OF VESSEL, NATIVE OR GRAFT: ICD-10-CM

## 2017-04-03 DIAGNOSIS — Z98.61 POSTSURGICAL PERCUTANEOUS TRANSLUMINAL CORONARY ANGIOPLASTY STATUS: ICD-10-CM

## 2017-04-03 PROCEDURE — 93798 PHYS/QHP OP CAR RHAB W/ECG: CPT | Mod: S$GLB,,, | Performed by: INTERNAL MEDICINE

## 2017-04-05 ENCOUNTER — CLINICAL SUPPORT (OUTPATIENT)
Dept: CARDIAC REHAB | Facility: CLINIC | Age: 62
End: 2017-04-05
Payer: COMMERCIAL

## 2017-04-05 DIAGNOSIS — I25.10 CORONARY ATHEROSCLEROSIS OF UNSPECIFIED TYPE OF VESSEL, NATIVE OR GRAFT: ICD-10-CM

## 2017-04-05 DIAGNOSIS — Z98.61 POSTSURGICAL PERCUTANEOUS TRANSLUMINAL CORONARY ANGIOPLASTY STATUS: ICD-10-CM

## 2017-04-05 PROCEDURE — 93798 PHYS/QHP OP CAR RHAB W/ECG: CPT | Mod: S$GLB,,, | Performed by: INTERNAL MEDICINE

## 2017-04-07 ENCOUNTER — CLINICAL SUPPORT (OUTPATIENT)
Dept: CARDIAC REHAB | Facility: CLINIC | Age: 62
End: 2017-04-07
Payer: COMMERCIAL

## 2017-04-07 DIAGNOSIS — Z98.61 POSTSURGICAL PERCUTANEOUS TRANSLUMINAL CORONARY ANGIOPLASTY STATUS: ICD-10-CM

## 2017-04-07 DIAGNOSIS — I25.10 CORONARY ARTERY DISEASE INVOLVING NATIVE CORONARY ARTERY OF NATIVE HEART WITHOUT ANGINA PECTORIS: ICD-10-CM

## 2017-04-07 PROCEDURE — 93798 PHYS/QHP OP CAR RHAB W/ECG: CPT | Mod: S$GLB,,, | Performed by: INTERNAL MEDICINE

## 2017-04-10 ENCOUNTER — CLINICAL SUPPORT (OUTPATIENT)
Dept: CARDIAC REHAB | Facility: CLINIC | Age: 62
End: 2017-04-10
Payer: COMMERCIAL

## 2017-04-10 DIAGNOSIS — I25.10 CORONARY ATHEROSCLEROSIS OF UNSPECIFIED TYPE OF VESSEL, NATIVE OR GRAFT: ICD-10-CM

## 2017-04-10 DIAGNOSIS — Z98.61 POSTSURGICAL PERCUTANEOUS TRANSLUMINAL CORONARY ANGIOPLASTY STATUS: ICD-10-CM

## 2017-04-10 PROCEDURE — 93798 PHYS/QHP OP CAR RHAB W/ECG: CPT | Mod: S$GLB,,, | Performed by: INTERNAL MEDICINE

## 2017-04-12 ENCOUNTER — CLINICAL SUPPORT (OUTPATIENT)
Dept: CARDIAC REHAB | Facility: CLINIC | Age: 62
End: 2017-04-12
Payer: COMMERCIAL

## 2017-04-12 DIAGNOSIS — Z98.61 POSTSURGICAL PERCUTANEOUS TRANSLUMINAL CORONARY ANGIOPLASTY STATUS: ICD-10-CM

## 2017-04-12 DIAGNOSIS — I25.10 CORONARY ATHEROSCLEROSIS OF UNSPECIFIED TYPE OF VESSEL, NATIVE OR GRAFT: ICD-10-CM

## 2017-04-12 PROCEDURE — 93798 PHYS/QHP OP CAR RHAB W/ECG: CPT | Mod: S$GLB,,, | Performed by: INTERNAL MEDICINE

## 2017-04-17 ENCOUNTER — CLINICAL SUPPORT (OUTPATIENT)
Dept: CARDIAC REHAB | Facility: CLINIC | Age: 62
End: 2017-04-17
Payer: COMMERCIAL

## 2017-04-17 DIAGNOSIS — I25.10 CORONARY ATHEROSCLEROSIS OF UNSPECIFIED TYPE OF VESSEL, NATIVE OR GRAFT: ICD-10-CM

## 2017-04-17 DIAGNOSIS — Z98.61 POSTSURGICAL PERCUTANEOUS TRANSLUMINAL CORONARY ANGIOPLASTY STATUS: ICD-10-CM

## 2017-04-17 PROCEDURE — 93798 PHYS/QHP OP CAR RHAB W/ECG: CPT | Mod: S$GLB,,, | Performed by: INTERNAL MEDICINE

## 2017-04-19 ENCOUNTER — CLINICAL SUPPORT (OUTPATIENT)
Dept: CARDIAC REHAB | Facility: CLINIC | Age: 62
End: 2017-04-19
Payer: COMMERCIAL

## 2017-04-19 DIAGNOSIS — I25.10 CORONARY ATHEROSCLEROSIS OF UNSPECIFIED TYPE OF VESSEL, NATIVE OR GRAFT: ICD-10-CM

## 2017-04-19 DIAGNOSIS — Z98.61 POSTSURGICAL PERCUTANEOUS TRANSLUMINAL CORONARY ANGIOPLASTY STATUS: ICD-10-CM

## 2017-04-19 PROCEDURE — 93798 PHYS/QHP OP CAR RHAB W/ECG: CPT | Mod: S$GLB,,, | Performed by: INTERNAL MEDICINE

## 2017-04-21 ENCOUNTER — CLINICAL SUPPORT (OUTPATIENT)
Dept: CARDIAC REHAB | Facility: CLINIC | Age: 62
End: 2017-04-21
Payer: COMMERCIAL

## 2017-04-21 DIAGNOSIS — Z98.61 POSTSURGICAL PERCUTANEOUS TRANSLUMINAL CORONARY ANGIOPLASTY STATUS: ICD-10-CM

## 2017-04-21 DIAGNOSIS — I25.10 CORONARY ATHEROSCLEROSIS OF UNSPECIFIED TYPE OF VESSEL, NATIVE OR GRAFT: ICD-10-CM

## 2017-04-21 PROCEDURE — 93798 PHYS/QHP OP CAR RHAB W/ECG: CPT | Mod: S$GLB,,, | Performed by: INTERNAL MEDICINE

## 2017-04-21 NOTE — PROGRESS NOTES
Patient tolerated exercise session well with no complaints.  Post blood glucose at 74.  Gave pt. 4 peanut butter crackers.

## 2017-04-24 ENCOUNTER — CLINICAL SUPPORT (OUTPATIENT)
Dept: CARDIAC REHAB | Facility: CLINIC | Age: 62
End: 2017-04-24
Payer: COMMERCIAL

## 2017-04-24 DIAGNOSIS — I25.10 CORONARY ATHEROSCLEROSIS OF UNSPECIFIED TYPE OF VESSEL, NATIVE OR GRAFT: ICD-10-CM

## 2017-04-24 DIAGNOSIS — Z98.61 POSTSURGICAL PERCUTANEOUS TRANSLUMINAL CORONARY ANGIOPLASTY STATUS: ICD-10-CM

## 2017-04-24 PROCEDURE — 93798 PHYS/QHP OP CAR RHAB W/ECG: CPT | Mod: S$GLB,,, | Performed by: INTERNAL MEDICINE

## 2017-04-26 ENCOUNTER — CLINICAL SUPPORT (OUTPATIENT)
Dept: CARDIAC REHAB | Facility: CLINIC | Age: 62
End: 2017-04-26
Payer: COMMERCIAL

## 2017-04-26 DIAGNOSIS — Z98.61 S/P PERCUTANEOUS TRANSLUMINAL CORONARY ANGIOPLASTY: ICD-10-CM

## 2017-04-26 DIAGNOSIS — I25.10 CORONARY ATHEROSCLEROSIS OF UNSPECIFIED TYPE OF VESSEL, NATIVE OR GRAFT: ICD-10-CM

## 2017-04-26 PROCEDURE — 93798 PHYS/QHP OP CAR RHAB W/ECG: CPT | Mod: S$GLB,,, | Performed by: INTERNAL MEDICINE

## 2017-04-28 ENCOUNTER — CLINICAL SUPPORT (OUTPATIENT)
Dept: CARDIAC REHAB | Facility: CLINIC | Age: 62
End: 2017-04-28
Payer: COMMERCIAL

## 2017-04-28 DIAGNOSIS — I25.10 CORONARY ATHEROSCLEROSIS OF UNSPECIFIED TYPE OF VESSEL, NATIVE OR GRAFT: ICD-10-CM

## 2017-04-28 DIAGNOSIS — Z98.61 POSTSURGICAL PERCUTANEOUS TRANSLUMINAL CORONARY ANGIOPLASTY STATUS: ICD-10-CM

## 2017-04-28 PROCEDURE — 93798 PHYS/QHP OP CAR RHAB W/ECG: CPT | Mod: S$GLB,,, | Performed by: INTERNAL MEDICINE

## 2017-05-01 ENCOUNTER — CLINICAL SUPPORT (OUTPATIENT)
Dept: CARDIAC REHAB | Facility: CLINIC | Age: 62
End: 2017-05-01
Payer: COMMERCIAL

## 2017-05-01 DIAGNOSIS — I25.10 CORONARY ATHEROSCLEROSIS OF UNSPECIFIED TYPE OF VESSEL, NATIVE OR GRAFT: ICD-10-CM

## 2017-05-01 DIAGNOSIS — Z98.61 S/P PTCA (PERCUTANEOUS TRANSLUMINAL CORONARY ANGIOPLASTY): ICD-10-CM

## 2017-05-01 PROCEDURE — 93798 PHYS/QHP OP CAR RHAB W/ECG: CPT | Mod: S$GLB,,, | Performed by: INTERNAL MEDICINE

## 2017-05-03 ENCOUNTER — CLINICAL SUPPORT (OUTPATIENT)
Dept: CARDIAC REHAB | Facility: CLINIC | Age: 62
End: 2017-05-03
Payer: COMMERCIAL

## 2017-05-03 DIAGNOSIS — I25.10 CORONARY ATHEROSCLEROSIS OF UNSPECIFIED TYPE OF VESSEL, NATIVE OR GRAFT: ICD-10-CM

## 2017-05-03 DIAGNOSIS — Z98.61 S/P PTCA (PERCUTANEOUS TRANSLUMINAL CORONARY ANGIOPLASTY): ICD-10-CM

## 2017-05-03 PROCEDURE — 93798 PHYS/QHP OP CAR RHAB W/ECG: CPT | Mod: S$GLB,,, | Performed by: INTERNAL MEDICINE

## 2017-05-05 ENCOUNTER — CLINICAL SUPPORT (OUTPATIENT)
Dept: CARDIAC REHAB | Facility: CLINIC | Age: 62
End: 2017-05-05
Payer: COMMERCIAL

## 2017-05-05 DIAGNOSIS — Z98.61 POSTSURGICAL PERCUTANEOUS TRANSLUMINAL CORONARY ANGIOPLASTY STATUS: ICD-10-CM

## 2017-05-05 DIAGNOSIS — I25.10 CORONARY ATHEROSCLEROSIS OF UNSPECIFIED TYPE OF VESSEL, NATIVE OR GRAFT: ICD-10-CM

## 2017-05-05 DIAGNOSIS — R52 PAIN: ICD-10-CM

## 2017-05-05 PROCEDURE — 93798 PHYS/QHP OP CAR RHAB W/ECG: CPT | Mod: S$GLB,,, | Performed by: INTERNAL MEDICINE

## 2017-05-05 RX ORDER — ETODOLAC 500 MG/1
500 TABLET, FILM COATED ORAL 2 TIMES DAILY
Qty: 60 TABLET | Refills: 4 | Status: SHIPPED | OUTPATIENT
Start: 2017-05-05 | End: 2017-09-22 | Stop reason: SDUPTHER

## 2017-05-08 ENCOUNTER — CLINICAL SUPPORT (OUTPATIENT)
Dept: CARDIAC REHAB | Facility: CLINIC | Age: 62
End: 2017-05-08
Payer: COMMERCIAL

## 2017-05-08 DIAGNOSIS — Z98.61 S/P PERCUTANEOUS TRANSLUMINAL CORONARY ANGIOPLASTY: ICD-10-CM

## 2017-05-08 DIAGNOSIS — I25.10 CORONARY ATHEROSCLEROSIS OF UNSPECIFIED TYPE OF VESSEL, NATIVE OR GRAFT: ICD-10-CM

## 2017-05-08 PROCEDURE — 93798 PHYS/QHP OP CAR RHAB W/ECG: CPT | Mod: S$GLB,,, | Performed by: INTERNAL MEDICINE

## 2017-05-09 RX ORDER — TAMSULOSIN HYDROCHLORIDE 0.4 MG/1
CAPSULE ORAL
Qty: 90 CAPSULE | Refills: 3 | Status: SHIPPED | OUTPATIENT
Start: 2017-05-09 | End: 2018-04-06 | Stop reason: SDUPTHER

## 2017-05-10 ENCOUNTER — CLINICAL SUPPORT (OUTPATIENT)
Dept: CARDIAC REHAB | Facility: CLINIC | Age: 62
End: 2017-05-10
Payer: COMMERCIAL

## 2017-05-10 DIAGNOSIS — Z98.61 POSTSURGICAL PERCUTANEOUS TRANSLUMINAL CORONARY ANGIOPLASTY STATUS: ICD-10-CM

## 2017-05-10 DIAGNOSIS — I25.10 CORONARY ATHEROSCLEROSIS OF UNSPECIFIED TYPE OF VESSEL, NATIVE OR GRAFT: ICD-10-CM

## 2017-05-10 PROCEDURE — 93798 PHYS/QHP OP CAR RHAB W/ECG: CPT | Mod: S$GLB,,, | Performed by: INTERNAL MEDICINE

## 2017-05-12 ENCOUNTER — CLINICAL SUPPORT (OUTPATIENT)
Dept: CARDIAC REHAB | Facility: CLINIC | Age: 62
End: 2017-05-12
Payer: COMMERCIAL

## 2017-05-12 DIAGNOSIS — I25.10 CORONARY ATHEROSCLEROSIS OF UNSPECIFIED TYPE OF VESSEL, NATIVE OR GRAFT: ICD-10-CM

## 2017-05-12 DIAGNOSIS — Z98.61 POSTSURGICAL PERCUTANEOUS TRANSLUMINAL CORONARY ANGIOPLASTY STATUS: ICD-10-CM

## 2017-05-12 PROCEDURE — 93798 PHYS/QHP OP CAR RHAB W/ECG: CPT | Mod: S$GLB,,, | Performed by: INTERNAL MEDICINE

## 2017-05-15 ENCOUNTER — CLINICAL SUPPORT (OUTPATIENT)
Dept: CARDIAC REHAB | Facility: CLINIC | Age: 62
End: 2017-05-15
Payer: COMMERCIAL

## 2017-05-15 DIAGNOSIS — Z98.61 POSTSURGICAL PERCUTANEOUS TRANSLUMINAL CORONARY ANGIOPLASTY STATUS: ICD-10-CM

## 2017-05-15 DIAGNOSIS — I25.10 CORONARY ATHEROSCLEROSIS OF UNSPECIFIED TYPE OF VESSEL, NATIVE OR GRAFT: ICD-10-CM

## 2017-05-15 PROCEDURE — 93798 PHYS/QHP OP CAR RHAB W/ECG: CPT | Mod: S$GLB,,, | Performed by: INTERNAL MEDICINE

## 2017-05-17 ENCOUNTER — CLINICAL SUPPORT (OUTPATIENT)
Dept: CARDIAC REHAB | Facility: CLINIC | Age: 62
End: 2017-05-17
Payer: COMMERCIAL

## 2017-05-17 DIAGNOSIS — I25.10 CORONARY ATHEROSCLEROSIS OF UNSPECIFIED TYPE OF VESSEL, NATIVE OR GRAFT: ICD-10-CM

## 2017-05-17 DIAGNOSIS — Z98.61 POSTSURGICAL PERCUTANEOUS TRANSLUMINAL CORONARY ANGIOPLASTY STATUS: ICD-10-CM

## 2017-05-17 PROCEDURE — 93798 PHYS/QHP OP CAR RHAB W/ECG: CPT | Mod: S$GLB,,, | Performed by: INTERNAL MEDICINE

## 2017-05-19 ENCOUNTER — CLINICAL SUPPORT (OUTPATIENT)
Dept: CARDIAC REHAB | Facility: CLINIC | Age: 62
End: 2017-05-19
Payer: COMMERCIAL

## 2017-05-19 DIAGNOSIS — I25.10 CORONARY ATHEROSCLEROSIS OF UNSPECIFIED TYPE OF VESSEL, NATIVE OR GRAFT: ICD-10-CM

## 2017-05-19 DIAGNOSIS — Z98.61 POSTSURGICAL PERCUTANEOUS TRANSLUMINAL CORONARY ANGIOPLASTY STATUS: ICD-10-CM

## 2017-05-19 PROCEDURE — 93798 PHYS/QHP OP CAR RHAB W/ECG: CPT | Mod: S$GLB,,, | Performed by: INTERNAL MEDICINE

## 2017-05-22 ENCOUNTER — CLINICAL SUPPORT (OUTPATIENT)
Dept: CARDIAC REHAB | Facility: CLINIC | Age: 62
End: 2017-05-22
Payer: COMMERCIAL

## 2017-05-22 DIAGNOSIS — Z98.61 POSTSURGICAL PERCUTANEOUS TRANSLUMINAL CORONARY ANGIOPLASTY STATUS: ICD-10-CM

## 2017-05-22 DIAGNOSIS — I25.10 CORONARY ATHEROSCLEROSIS OF UNSPECIFIED TYPE OF VESSEL, NATIVE OR GRAFT: ICD-10-CM

## 2017-05-22 PROCEDURE — 93798 PHYS/QHP OP CAR RHAB W/ECG: CPT | Mod: S$GLB,,, | Performed by: INTERNAL MEDICINE

## 2017-05-23 ENCOUNTER — OFFICE VISIT (OUTPATIENT)
Dept: CARDIOLOGY | Facility: CLINIC | Age: 62
End: 2017-05-23
Payer: COMMERCIAL

## 2017-05-23 VITALS
OXYGEN SATURATION: 96 % | HEIGHT: 75 IN | DIASTOLIC BLOOD PRESSURE: 52 MMHG | WEIGHT: 232.81 LBS | HEART RATE: 70 BPM | SYSTOLIC BLOOD PRESSURE: 84 MMHG | BODY MASS INDEX: 28.95 KG/M2

## 2017-05-23 DIAGNOSIS — E11.69 DIABETES MELLITUS TYPE 2 IN OBESE: ICD-10-CM

## 2017-05-23 DIAGNOSIS — R07.89 CHEST PAIN, ATYPICAL: ICD-10-CM

## 2017-05-23 DIAGNOSIS — E66.9 DIABETES MELLITUS TYPE 2 IN OBESE: ICD-10-CM

## 2017-05-23 DIAGNOSIS — I25.10 CORONARY ARTERY DISEASE INVOLVING NATIVE CORONARY ARTERY OF NATIVE HEART WITHOUT ANGINA PECTORIS: Primary | ICD-10-CM

## 2017-05-23 DIAGNOSIS — I10 HYPERTENSION: ICD-10-CM

## 2017-05-23 DIAGNOSIS — I10 ESSENTIAL HYPERTENSION: ICD-10-CM

## 2017-05-23 PROCEDURE — 1160F RVW MEDS BY RX/DR IN RCRD: CPT | Mod: S$GLB,,, | Performed by: INTERNAL MEDICINE

## 2017-05-23 PROCEDURE — 99999 PR PBB SHADOW E&M-EST. PATIENT-LVL IV: CPT | Mod: PBBFAC,,, | Performed by: INTERNAL MEDICINE

## 2017-05-23 PROCEDURE — 99213 OFFICE O/P EST LOW 20 MIN: CPT | Mod: S$GLB,,, | Performed by: INTERNAL MEDICINE

## 2017-05-23 PROCEDURE — 3074F SYST BP LT 130 MM HG: CPT | Mod: S$GLB,,, | Performed by: INTERNAL MEDICINE

## 2017-05-23 PROCEDURE — 3078F DIAST BP <80 MM HG: CPT | Mod: S$GLB,,, | Performed by: INTERNAL MEDICINE

## 2017-05-23 PROCEDURE — 3044F HG A1C LEVEL LT 7.0%: CPT | Mod: S$GLB,,, | Performed by: INTERNAL MEDICINE

## 2017-05-23 RX ORDER — DAPAGLIFLOZIN 10 MG/1
TABLET, FILM COATED ORAL DAILY
COMMUNITY
End: 2020-03-13

## 2017-05-23 NOTE — PROGRESS NOTES
Subjective:    Patient ID:  Edel Edwards III is a 61 y.o. male who presents for follow-up of Coronary Artery Disease      HPI     HTN, DM, CAD - TESSA to LAD 12/20/16 12/20/16 [facility] - EDP 17, no LV, LAD tandem proximal 90% lesions, Cx distal 70%, RCA medium with diffuse mid 80%        Description of the findings of the procedure: calcified, fibrotic 90%prox LAD --> 0% by predil with 3.5 angioscore and NC balloon. Placement of a 3.5 x 30 mm TESSA.     He continued to have mostly fatigue symptoms following stent     Stress test 2/14/17  LVEF: 59 %  Impression: NORMAL MYOCARDIAL PERFUSION  1. The perfusion scan is free of evidence for myocardial ischemia or injury.   2. There is a mild to moderate intensity fixed defect in the inferior wall of the left ventricle, secondary to diaphragm attenuation.   3. Resting wall motion is physiologic.   4. Resting LV function is normal.   5. The ventricular volumes are normal at rest and stress.   6. The extracardiac distribution of radioactivity is normal.   7. When compared to the previous study from 12/13/2016, inferior defect appears mostly artifiact now.     Inferior defect appears mostly reversible on my review    Reviewed cath film and stress test with Dr Fried - could be a candidate for PCI of RCA although this is a medium sized diffusely diseased vessel. Given improved symptoms will continue to treat medically for now     Feeling better - fatigue improved - denies CP or SOB  Doing well with cardiac rehab    Review of Systems   Constitution: Negative for decreased appetite.   HENT: Negative for ear discharge.    Eyes: Negative for blurred vision.   Endocrine: Negative for polyphagia.   Skin: Negative for nail changes.   Neurological: Negative for aphonia.   Psychiatric/Behavioral: Negative for hallucinations.        Objective:    Physical Exam   Constitutional: He is oriented to person, place, and time. He appears well-developed and well-nourished.   HENT:   Head:  Normocephalic and atraumatic.   Eyes: Conjunctivae are normal. Pupils are equal, round, and reactive to light.   Neck: Normal range of motion. Neck supple.   Cardiovascular: Normal rate, normal heart sounds and intact distal pulses.    Pulmonary/Chest: Effort normal and breath sounds normal.   Abdominal: Soft. Bowel sounds are normal.   Musculoskeletal: Normal range of motion.   Neurological: He is alert and oriented to person, place, and time.   Skin: Skin is warm and dry.         Assessment:       1. Coronary artery disease involving native coronary artery of native heart without angina pectoris    2. Essential hypertension    3. Chest pain, atypical    4. Diabetes mellitus type 2 in obese         Plan:          Cardiac stable  If low BP becomes symptomatic would stop hytrin

## 2017-05-24 ENCOUNTER — CLINICAL SUPPORT (OUTPATIENT)
Dept: CARDIAC REHAB | Facility: CLINIC | Age: 62
End: 2017-05-24
Payer: COMMERCIAL

## 2017-05-24 DIAGNOSIS — Z98.61 POSTSURGICAL PERCUTANEOUS TRANSLUMINAL CORONARY ANGIOPLASTY STATUS: ICD-10-CM

## 2017-05-24 DIAGNOSIS — I25.10 CORONARY ATHEROSCLEROSIS OF UNSPECIFIED TYPE OF VESSEL, NATIVE OR GRAFT: ICD-10-CM

## 2017-05-24 PROCEDURE — 93798 PHYS/QHP OP CAR RHAB W/ECG: CPT | Mod: S$GLB,,, | Performed by: INTERNAL MEDICINE

## 2017-05-26 ENCOUNTER — APPOINTMENT (OUTPATIENT)
Dept: CARDIAC REHAB | Facility: CLINIC | Age: 62
End: 2017-05-26
Payer: COMMERCIAL

## 2017-05-26 DIAGNOSIS — Z98.61 POSTSURGICAL PERCUTANEOUS TRANSLUMINAL CORONARY ANGIOPLASTY STATUS: ICD-10-CM

## 2017-05-26 DIAGNOSIS — E11.9 TYPE 2 DIABETES MELLITUS WITHOUT COMPLICATION: ICD-10-CM

## 2017-05-26 DIAGNOSIS — I25.10 CORONARY ATHEROSCLEROSIS OF UNSPECIFIED TYPE OF VESSEL, NATIVE OR GRAFT: ICD-10-CM

## 2017-05-26 PROCEDURE — 93798 PHYS/QHP OP CAR RHAB W/ECG: CPT | Mod: S$GLB,,, | Performed by: INTERNAL MEDICINE

## 2017-05-29 ENCOUNTER — CLINICAL SUPPORT (OUTPATIENT)
Dept: CARDIAC REHAB | Facility: CLINIC | Age: 62
End: 2017-05-29
Payer: COMMERCIAL

## 2017-05-29 DIAGNOSIS — I25.10 CORONARY ATHEROSCLEROSIS OF UNSPECIFIED TYPE OF VESSEL, NATIVE OR GRAFT: ICD-10-CM

## 2017-05-29 DIAGNOSIS — Z98.61 POSTSURGICAL PERCUTANEOUS TRANSLUMINAL CORONARY ANGIOPLASTY STATUS: ICD-10-CM

## 2017-05-29 PROCEDURE — 93798 PHYS/QHP OP CAR RHAB W/ECG: CPT | Mod: S$GLB,,, | Performed by: INTERNAL MEDICINE

## 2017-05-31 ENCOUNTER — CLINICAL SUPPORT (OUTPATIENT)
Dept: CARDIAC REHAB | Facility: CLINIC | Age: 62
End: 2017-05-31
Payer: COMMERCIAL

## 2017-05-31 DIAGNOSIS — I25.10 CORONARY ATHEROSCLEROSIS OF UNSPECIFIED TYPE OF VESSEL, NATIVE OR GRAFT: ICD-10-CM

## 2017-05-31 DIAGNOSIS — Z98.61 POSTSURGICAL PERCUTANEOUS TRANSLUMINAL CORONARY ANGIOPLASTY STATUS: ICD-10-CM

## 2017-05-31 PROCEDURE — 93798 PHYS/QHP OP CAR RHAB W/ECG: CPT | Mod: S$GLB,,, | Performed by: INTERNAL MEDICINE

## 2017-06-02 ENCOUNTER — CLINICAL SUPPORT (OUTPATIENT)
Dept: CARDIAC REHAB | Facility: CLINIC | Age: 62
End: 2017-06-02
Payer: COMMERCIAL

## 2017-06-02 DIAGNOSIS — Z98.61 POSTSURGICAL PERCUTANEOUS TRANSLUMINAL CORONARY ANGIOPLASTY STATUS: ICD-10-CM

## 2017-06-02 DIAGNOSIS — I25.10 CORONARY ATHEROSCLEROSIS OF UNSPECIFIED TYPE OF VESSEL, NATIVE OR GRAFT: ICD-10-CM

## 2017-06-02 PROCEDURE — 93798 PHYS/QHP OP CAR RHAB W/ECG: CPT | Mod: S$GLB,,, | Performed by: INTERNAL MEDICINE

## 2017-06-05 ENCOUNTER — CLINICAL SUPPORT (OUTPATIENT)
Dept: CARDIAC REHAB | Facility: CLINIC | Age: 62
End: 2017-06-05
Payer: COMMERCIAL

## 2017-06-05 DIAGNOSIS — I25.10 CORONARY ATHEROSCLEROSIS OF UNSPECIFIED TYPE OF VESSEL, NATIVE OR GRAFT: ICD-10-CM

## 2017-06-05 DIAGNOSIS — Z98.61 POSTSURGICAL PERCUTANEOUS TRANSLUMINAL CORONARY ANGIOPLASTY STATUS: ICD-10-CM

## 2017-06-05 PROCEDURE — 93798 PHYS/QHP OP CAR RHAB W/ECG: CPT | Mod: S$GLB,,, | Performed by: INTERNAL MEDICINE

## 2017-06-07 ENCOUNTER — CLINICAL SUPPORT (OUTPATIENT)
Dept: CARDIAC REHAB | Facility: CLINIC | Age: 62
End: 2017-06-07
Payer: COMMERCIAL

## 2017-06-07 DIAGNOSIS — Z98.61 POSTSURGICAL PERCUTANEOUS TRANSLUMINAL CORONARY ANGIOPLASTY STATUS: ICD-10-CM

## 2017-06-07 DIAGNOSIS — I25.10 CORONARY ATHEROSCLEROSIS OF UNSPECIFIED TYPE OF VESSEL, NATIVE OR GRAFT: ICD-10-CM

## 2017-06-07 DIAGNOSIS — Z98.61 POSTSURGICAL PERCUTANEOUS TRANSLUMINAL CORONARY ANGIOPLASTY STATUS: Primary | ICD-10-CM

## 2017-06-07 PROCEDURE — 93798 PHYS/QHP OP CAR RHAB W/ECG: CPT | Mod: S$GLB,,, | Performed by: INTERNAL MEDICINE

## 2017-06-09 ENCOUNTER — CLINICAL SUPPORT (OUTPATIENT)
Dept: CARDIAC REHAB | Facility: CLINIC | Age: 62
End: 2017-06-09
Payer: COMMERCIAL

## 2017-06-09 DIAGNOSIS — Z98.61 S/P PERCUTANEOUS TRANSLUMINAL CORONARY ANGIOPLASTY: ICD-10-CM

## 2017-06-09 DIAGNOSIS — I25.10 CORONARY ATHEROSCLEROSIS OF UNSPECIFIED TYPE OF VESSEL, NATIVE OR GRAFT: ICD-10-CM

## 2017-06-09 PROCEDURE — 93798 PHYS/QHP OP CAR RHAB W/ECG: CPT | Mod: S$GLB,,, | Performed by: INTERNAL MEDICINE

## 2017-06-12 ENCOUNTER — CLINICAL SUPPORT (OUTPATIENT)
Dept: CARDIAC REHAB | Facility: CLINIC | Age: 62
End: 2017-06-12
Payer: COMMERCIAL

## 2017-06-12 DIAGNOSIS — I25.10 CORONARY ATHEROSCLEROSIS OF UNSPECIFIED TYPE OF VESSEL, NATIVE OR GRAFT: ICD-10-CM

## 2017-06-12 DIAGNOSIS — Z98.61 POSTSURGICAL PERCUTANEOUS TRANSLUMINAL CORONARY ANGIOPLASTY STATUS: ICD-10-CM

## 2017-06-12 PROCEDURE — 93798 PHYS/QHP OP CAR RHAB W/ECG: CPT | Mod: S$GLB,,, | Performed by: INTERNAL MEDICINE

## 2017-06-14 ENCOUNTER — CLINICAL SUPPORT (OUTPATIENT)
Dept: CARDIAC REHAB | Facility: CLINIC | Age: 62
End: 2017-06-14
Payer: COMMERCIAL

## 2017-06-14 DIAGNOSIS — I25.10 CORONARY ARTERY DISEASE INVOLVING NATIVE CORONARY ARTERY OF NATIVE HEART WITHOUT ANGINA PECTORIS: ICD-10-CM

## 2017-06-14 DIAGNOSIS — Z98.61 PERCUTANEOUS TRANSLUMINAL CORONARY ANGIOPLASTY STATUS: ICD-10-CM

## 2017-06-14 PROCEDURE — 93798 PHYS/QHP OP CAR RHAB W/ECG: CPT | Mod: S$GLB,,, | Performed by: INTERNAL MEDICINE

## 2017-06-16 ENCOUNTER — CLINICAL SUPPORT (OUTPATIENT)
Dept: CARDIAC REHAB | Facility: CLINIC | Age: 62
End: 2017-06-16
Payer: COMMERCIAL

## 2017-06-16 DIAGNOSIS — Z98.61 POSTSURGICAL PERCUTANEOUS TRANSLUMINAL CORONARY ANGIOPLASTY STATUS: ICD-10-CM

## 2017-06-16 DIAGNOSIS — I25.10 CORONARY ATHEROSCLEROSIS OF UNSPECIFIED TYPE OF VESSEL, NATIVE OR GRAFT: ICD-10-CM

## 2017-06-16 PROCEDURE — 93798 PHYS/QHP OP CAR RHAB W/ECG: CPT | Mod: S$GLB,,, | Performed by: INTERNAL MEDICINE

## 2017-06-19 ENCOUNTER — CLINICAL SUPPORT (OUTPATIENT)
Dept: CARDIAC REHAB | Facility: CLINIC | Age: 62
End: 2017-06-19
Payer: COMMERCIAL

## 2017-06-19 DIAGNOSIS — Z98.61 POSTSURGICAL PERCUTANEOUS TRANSLUMINAL CORONARY ANGIOPLASTY STATUS: ICD-10-CM

## 2017-06-19 DIAGNOSIS — I25.10 CORONARY ATHEROSCLEROSIS OF UNSPECIFIED TYPE OF VESSEL, NATIVE OR GRAFT: ICD-10-CM

## 2017-06-19 PROCEDURE — 93798 PHYS/QHP OP CAR RHAB W/ECG: CPT | Mod: S$GLB,,, | Performed by: INTERNAL MEDICINE

## 2017-06-21 ENCOUNTER — CLINICAL SUPPORT (OUTPATIENT)
Dept: CARDIAC REHAB | Facility: CLINIC | Age: 62
End: 2017-06-21
Payer: COMMERCIAL

## 2017-06-21 DIAGNOSIS — Z98.61 POSTSURGICAL PERCUTANEOUS TRANSLUMINAL CORONARY ANGIOPLASTY STATUS: ICD-10-CM

## 2017-06-21 DIAGNOSIS — I25.10 CORONARY ATHEROSCLEROSIS OF UNSPECIFIED TYPE OF VESSEL, NATIVE OR GRAFT: ICD-10-CM

## 2017-06-21 PROCEDURE — 93798 PHYS/QHP OP CAR RHAB W/ECG: CPT | Mod: S$GLB,,, | Performed by: INTERNAL MEDICINE

## 2017-06-22 ENCOUNTER — HOSPITAL ENCOUNTER (OUTPATIENT)
Dept: CARDIOLOGY | Facility: CLINIC | Age: 62
Discharge: HOME | End: 2017-06-22
Payer: COMMERCIAL

## 2017-06-22 DIAGNOSIS — Z98.61 POSTSURGICAL PERCUTANEOUS TRANSLUMINAL CORONARY ANGIOPLASTY STATUS: ICD-10-CM

## 2017-06-22 LAB — DIASTOLIC DYSFUNCTION: NO

## 2017-06-22 PROCEDURE — 94621 CARDIOPULM EXERCISE TESTING: CPT | Mod: S$GLB,,, | Performed by: INTERNAL MEDICINE

## 2017-06-26 ENCOUNTER — CLINICAL SUPPORT (OUTPATIENT)
Dept: CARDIAC REHAB | Facility: CLINIC | Age: 62
End: 2017-06-26
Payer: COMMERCIAL

## 2017-06-26 DIAGNOSIS — Z98.61 POSTSURGICAL PERCUTANEOUS TRANSLUMINAL CORONARY ANGIOPLASTY STATUS: ICD-10-CM

## 2017-06-26 DIAGNOSIS — I25.10 CORONARY ATHEROSCLEROSIS OF UNSPECIFIED TYPE OF VESSEL, NATIVE OR GRAFT: ICD-10-CM

## 2017-06-26 PROCEDURE — 99999 PR PBB SHADOW E&M-EST. PATIENT-LVL III: CPT | Mod: PBBFAC,,,

## 2017-06-26 PROCEDURE — 93798 PHYS/QHP OP CAR RHAB W/ECG: CPT | Mod: S$GLB,,, | Performed by: INTERNAL MEDICINE

## 2017-06-26 NOTE — PROGRESS NOTES
Patient here for exit interview for Phase II Cardiac rehab.    Discussed with patient pre/post labs, stress tests, QOL, risk factors, goals & home exercise prescription.      QOL:  The following changes were noted on Bang & SF36 Questionnaire:  Bang:                 PRE:       POST:              SF36         PRE:        POST:  Anxiety                    5              6                                        85             100                                Somatic                  10            6                                       Depression             7             3                                       Hostility                   6             8                      Patient reports to have stress at work, but reports nothing overwhelming.  He states that he has good family support.  Patient has been instructed to seek attention via PCP/Psychiatry in the event that circumstances change.  Patient verbalizes understanding.       RISK FACTORS:    Hypertension-well controlled.  Instructed pt to report any lightheadedness or dizziness to Cardiologist  Diabetes-well controlled. HGA1C from 6.0 to 5.7.  Sedentary lifestyle-pt reports to be exercising 6 days per week.  Hyperlipidemia-well controlled.  Stress/anxiety-see note above  Obesity-BMI improved from 32 to 31.  5 lb weight loss.    GOALS:  The following goals have been met:  Increase exercise tolerance  Decrease blood pressure  Weight loss-5 lb loss  ID & manage personal areas of stress    Home exercise prescription discussed with patient.  Patient has been instructed to follow up with Cardiologist.   Information on Medical Fitness Program at [facility] given to patient.  Patient is currently exercising at home.    Bertha Paniagua RN  Cardiac Rehab Nurse

## 2017-06-26 NOTE — PROGRESS NOTES
Met with Edel Edwards III for exit to Phase II cardiac rehab.      Weight: 231 lbs  BMI: 31.32  Abdominal girth: 44.5 inches  Body fat: 15.2%    Pt did not reach his weight goal, however did lose weight. Pt lost 2 inches in abdominal girth as well as decreased body fat %.    Labs reviewed with patient. Patient confirms he is taking atorvastatin for cholesterol control.    Lab Results   Component Value Date    CHOL 146 06/22/2017     Lab Results   Component Value Date    HDL 40 06/22/2017     Lab Results   Component Value Date    LDLCALC 85.0 06/22/2017     Lab Results   Component Value Date    TRIG 105 06/22/2017     Lab Results   Component Value Date    CHOLHDL 27.4 06/22/2017         Lab Results   Component Value Date    GLUF 142 (H) 06/22/2017     Lab Results   Component Value Date    HGBA1C 5.7 (H) 06/22/2017       Patient eats 3 meals daily.  Pt's wife prepares meals at home.  Seasons food with a little bit of slap ya mamma. Pt has cut back on sodium and compliant with diet. Pt reports limiting beef to 1x/month or less. No lino.  Denies use of a salt shaker at the table on prepared foods. Dines out 5 meals per week (lunch, chooses salads). No fried foods. Chooses fish 2-3 times per week. Beverages include iced tea, coffee, crystal light..  Alcohol none.    Edel Edwards III encouraged to contact cardiac rehab staff with any additional questions or concerns regarding diet.      Clarisa Humphries MS, RD, LDN

## 2017-06-26 NOTE — PROGRESS NOTES
Exercise:  Met with the patient for exit interview.  Entry and exit CPX test results were discussed as well as current and future exercise routine.      On entry, an estimated MET Level of 9.0 and a Peak VO2 of 23.0ml/kg/min (6.6 actual METS) were measured.  Upon exit, the patient achieved 12.0 estimated METS on the CPX test and a Peak VO2 of 28.0ml/kg/min (8.0 actual METS).      The goal of a 30% improvement to 11.7 was exceeded.    Based on exit CPX test, the target heart range during aerobic exercise for this patient is 97 to 129 bpm.      Exit CPX test results also included weight (231 lb), abdominal girth (44.5 in), BMI (31.32), and body composition (15.2%).    Patient stated he has been exercising aerobically 6 days/week using an elliptical and gazelle for a total of at least 35 to 45 minutes.  He has also included resistance training on non-consecutive days and stretching after each exercise session.      Patient was praised for his increase in exercise tolerance based on CPX test results and continuing to exercise after completing the Phase II cardiac rehab program.  We discussed in detail his options for exercise and what he can do so that he can get all aspects of a complete exercise program to fit into his schedule.      Aerobic exercise recommendations of 5 to 6 days/week for 30 to 60 minutes, resistance training 2 to 3 non-consecutive days/week and stretching after each session of exercise was reviewed and patient was asked to call if they have and questions in the future.  Patient stated understanding.    Patient was compliant with attendance to the rehab classes.    Zina Venegas., CEP

## 2017-07-19 ENCOUNTER — LAB VISIT (OUTPATIENT)
Dept: LAB | Facility: HOSPITAL | Age: 62
End: 2017-07-19
Attending: UROLOGY
Payer: COMMERCIAL

## 2017-07-19 DIAGNOSIS — R97.20 ELEVATED PSA: ICD-10-CM

## 2017-07-19 LAB
PROSTATE SPECIFIC ANTIGEN, TOTAL: 5.5 NG/ML
PSA FREE MFR SERPL: 28.36 %
PSA FREE SERPL-MCNC: 1.56 NG/ML

## 2017-07-19 PROCEDURE — 36415 COLL VENOUS BLD VENIPUNCTURE: CPT

## 2017-07-19 PROCEDURE — 84153 ASSAY OF PSA TOTAL: CPT

## 2017-07-20 ENCOUNTER — DOCUMENTATION ONLY (OUTPATIENT)
Dept: UROLOGY | Facility: CLINIC | Age: 62
End: 2017-07-20

## 2017-09-22 DIAGNOSIS — R52 PAIN: ICD-10-CM

## 2017-09-22 RX ORDER — ETODOLAC 500 MG/1
TABLET, FILM COATED ORAL
Qty: 60 TABLET | Refills: 4 | Status: SHIPPED | OUTPATIENT
Start: 2017-09-22 | End: 2018-01-22 | Stop reason: SDUPTHER

## 2017-09-28 ENCOUNTER — OFFICE VISIT (OUTPATIENT)
Dept: UROLOGY | Facility: CLINIC | Age: 62
End: 2017-09-28
Payer: COMMERCIAL

## 2017-09-28 VITALS
SYSTOLIC BLOOD PRESSURE: 110 MMHG | BODY MASS INDEX: 32.04 KG/M2 | DIASTOLIC BLOOD PRESSURE: 68 MMHG | WEIGHT: 236.56 LBS | HEIGHT: 72 IN | HEART RATE: 62 BPM

## 2017-09-28 DIAGNOSIS — R97.20 ELEVATED PSA: ICD-10-CM

## 2017-09-28 DIAGNOSIS — N13.8 BPH WITH OBSTRUCTION/LOWER URINARY TRACT SYMPTOMS: Primary | ICD-10-CM

## 2017-09-28 DIAGNOSIS — R35.1 NOCTURIA MORE THAN TWICE PER NIGHT: ICD-10-CM

## 2017-09-28 DIAGNOSIS — R33.9 INCOMPLETE EMPTYING OF BLADDER: ICD-10-CM

## 2017-09-28 DIAGNOSIS — N40.1 BPH WITH OBSTRUCTION/LOWER URINARY TRACT SYMPTOMS: Primary | ICD-10-CM

## 2017-09-28 PROCEDURE — 3008F BODY MASS INDEX DOCD: CPT | Mod: S$GLB,,, | Performed by: UROLOGY

## 2017-09-28 PROCEDURE — 99999 PR PBB SHADOW E&M-EST. PATIENT-LVL III: CPT | Mod: PBBFAC,,, | Performed by: UROLOGY

## 2017-09-28 PROCEDURE — 3078F DIAST BP <80 MM HG: CPT | Mod: S$GLB,,, | Performed by: UROLOGY

## 2017-09-28 PROCEDURE — 99214 OFFICE O/P EST MOD 30 MIN: CPT | Mod: S$GLB,,, | Performed by: UROLOGY

## 2017-09-28 PROCEDURE — 3074F SYST BP LT 130 MM HG: CPT | Mod: S$GLB,,, | Performed by: UROLOGY

## 2017-09-28 NOTE — PROGRESS NOTES
Subjective:       Patient ID: Edel Edwards III is a 62 y.o. male who was last seen in this office Visit date not found    Chief Complaint:   Chief Complaint   Patient presents with    Elevated PSA     6 month f/u with psa from july       Elevated PSA  Patient is here with an elevated PSA. He has no personal history and no family history of prostate cancer.  He has a prior genitourinary history of erectile dysfunction, and previous biopsies a few years ago with Dr. Umanzor.  Previous PSA values are :    PSA Total 0.00 - 4.00 ng/mL 5.5     Comments: PSA Expected levels:   Hormonal Therapy: <0.05 ng/ml   Prostatectomy: <0.01 ng/ml   Radiation Therapy: <1.00 ng/ml    PSA, Free 0.01 - 1.50 ng/mL 1.56     PSA, Free Pct Not established % 28.36    Resulting Agency  [facility]      Specimen Collected: 07/19/17 09:20   Last Resulted: 07/19/17 20:12          Lab Results   Component Value Date    PSA 6.3 (H) 11/30/2016    PSA 8.2 (H) 06/17/2015     Benign Prostatic Hyperplasia  He patient reports nocturia two times a night. He denies straining, urgency and weak stream. The patient states symptoms are of moderate severity. Onset of symptoms was several years ago and was gradual in onset.  He has no personal history and no family history of prostate cancer. He reports a history of no complicating symptoms. He denies flank pain, gross hematuria, kidney stones and recurrent UTI.  He is currently taking Flomax.  He has a right directed stream.    Erectile Dysfunction  Patient complains of erectile dysfunction. Onset of dysfunction was several years ago and was gradual in onset.  Patient states the nature of difficulty is maintaining erection. Full erections occur with intercourse. Partial erections occur with intercourse. Libido is not affected. Risk factors for ED include cardiovascular disease and diabetes mellitus. Patient denies history of pelvic radiation. Previous treatment of ED includes Levitra and Cialis.  He is no longer  taking these after his recent cardiac stent placement on [facility].    Penile Rash  He has intermittently had issues with a red rash on his penis for a few year.  It will sometime itch.  He usually applies Desonide or Lotrisone cream but it always returns..    ACTIVE MEDICAL ISSUES:  Patient Active Problem List   Diagnosis    Diabetes mellitus type 2 in obese    HTN (hypertension)    Hyperlipidemia    Type 2 diabetes mellitus without retinopathy - Both Eyes    Nuclear sclerosis - Both Eyes    BMI 33.0-33.9,adult    Elevated PSA    Chest pain, atypical    CAD (coronary artery disease)       ALLERGIES AND MEDICATIONS: updated and reviewed.  Review of patient's allergies indicates:  No Known Allergies  Current Outpatient Prescriptions   Medication Sig    ACCU-CHEK SMARTVIEW TEST STRIP Strp     aspirin (ECOTRIN) 81 MG EC tablet Take 1 tablet (81 mg total) by mouth once daily.    atorvastatin (LIPITOR) 40 MG tablet take 1/2 tablet by mouth once daily    AZILSARTAN MEDOXOMIL (EDARBI ORAL) Take by mouth.    clopidogrel (PLAVIX) 75 mg tablet Take 1 tablet (75 mg total) by mouth once daily. Take 8 pills on the first day (Patient taking differently: Take 75 mg by mouth every morning. Take 8 pills on the first day)    clotrimazole-betamethasone 1-0.05% (LOTRISONE) cream Apply topically 2 (two) times daily.    DAPAGLIFLOZIN PROPANEDIOL (FARXIGA ORAL) Take by mouth.    etodolac (LODINE) 500 MG tablet take 1 tablet by mouth twice a day    FLUARIX QUAD 1363-6454, PF, 60 mcg (15 mcg x 4)/0.5 mL Syrg inject 0.5 milliliter intramuscularly    gabapentin (NEURONTIN) 100 MG capsule Take 400 mg by mouth every evening.     hydrocodone-acetaminophen  mg Tab Take 1 tablet by mouth once as needed.     isosorbide mononitrate (IMDUR) 30 MG 24 hr tablet Take 1 tablet (30 mg total) by mouth once daily. (Patient taking differently: Take 30 mg by mouth every morning. )    loratadine-pseudoephedrine 5-120 mg (CLARITIN-D  12-HOUR) 5-120 mg per tablet Take 1 tablet by mouth 2 (two) times daily.    metformin (GLUCOPHAGE) 1000 MG tablet Take 1,000 mg by mouth 2 (two) times daily with meals.      metoprolol succinate (TOPROL-XL) 25 MG 24 hr tablet Take 1 tablet (25 mg total) by mouth once daily. (Patient taking differently: Take 25 mg by mouth every morning. )    PNEUMOVAX 23 25 mcg/0.5 mL Syrg inject 0.5 milliliter intramuscularly    tamsulosin (FLOMAX) 0.4 mg Cp24 take 1 capsule by mouth daily    terazosin (HYTRIN) 2 MG capsule Take 2 mg by mouth every evening.    TRULICITY 0.75 mg/0.5 mL PnIj      No current facility-administered medications for this visit.        Review of Systems   Constitutional: Negative for activity change, fatigue, fever and unexpected weight change.   HENT: Negative for congestion.    Eyes: Negative for redness.   Respiratory: Negative for chest tightness and shortness of breath.    Cardiovascular: Negative for chest pain and leg swelling.   Gastrointestinal: Negative for abdominal pain, constipation, diarrhea, nausea and vomiting.   Genitourinary: Negative for dysuria, flank pain, frequency, hematuria, penile pain, penile swelling, scrotal swelling, testicular pain and urgency.   Musculoskeletal: Negative for arthralgias and back pain.   Neurological: Negative for dizziness and light-headedness.   Psychiatric/Behavioral: Negative for behavioral problems and confusion. The patient is not nervous/anxious.    All other systems reviewed and are negative.      Objective:      Vitals:    09/28/17 0938   BP: 110/68   Pulse: 62   Weight: 107.3 kg (236 lb 8.9 oz)   Height: 6' (1.829 m)     Physical Exam   Nursing note and vitals reviewed.  Constitutional: He is oriented to person, place, and time. He appears well-developed and well-nourished.   HENT:   Head: Normocephalic.   Eyes: Conjunctivae are normal.   Neck: Normal range of motion. No tracheal deviation present. No thyromegaly present.   Cardiovascular:  Normal rate and normal heart sounds.    Pulmonary/Chest: Effort normal and breath sounds normal. No respiratory distress. He has no wheezes.   Abdominal: Soft. He exhibits no distension and no mass. There is no hepatosplenomegaly. There is no tenderness. There is no rebound, no guarding and no CVA tenderness. No hernia. Hernia confirmed negative in the right inguinal area and confirmed negative in the left inguinal area.   Genitourinary: Rectum normal, testes normal and penis normal. Rectal exam shows no external hemorrhoid, no mass and no tenderness. Prostate is enlarged. Prostate is not tender. Right testis shows no mass and no tenderness. Left testis shows no mass and no tenderness.             Musculoskeletal: He exhibits no edema or tenderness.   Lymphadenopathy: No inguinal adenopathy noted on the right or left side.   Neurological: He is alert and oriented to person, place, and time.   Skin: Skin is warm and dry. No rash noted. No erythema.     Psychiatric: He has a normal mood and affect. His behavior is normal. Judgment and thought content normal.       Urine dipstick shows negative for all components.  Micro exam: negative for WBC's or RBC's.    Assessment:       1. BPH with obstruction/lower urinary tract symptoms    2. Nocturia more than twice per night    3. Elevated PSA    4. Incomplete emptying of bladder          Plan:       1. BPH with obstruction/lower urinary tract symptoms  Stay on Flomax  - Prostate Specific Antigen, Diagnostic; Future    2. Nocturia more than twice per night  Limit evening fluids    3. Elevated PSA  He has been thoroughly checked, switched back to annual    4. Incomplete emptying of bladder  stable            Return in about 1 year (around 9/28/2018) for Follow up, Review PSA.

## 2017-10-09 ENCOUNTER — PATIENT MESSAGE (OUTPATIENT)
Dept: CARDIOLOGY | Facility: CLINIC | Age: 62
End: 2017-10-09

## 2017-10-10 ENCOUNTER — PATIENT MESSAGE (OUTPATIENT)
Dept: CARDIOLOGY | Facility: CLINIC | Age: 62
End: 2017-10-10

## 2017-10-11 ENCOUNTER — PATIENT MESSAGE (OUTPATIENT)
Dept: CARDIOLOGY | Facility: CLINIC | Age: 62
End: 2017-10-11

## 2017-11-06 ENCOUNTER — PATIENT MESSAGE (OUTPATIENT)
Dept: CARDIOLOGY | Facility: CLINIC | Age: 62
End: 2017-11-06

## 2017-11-13 ENCOUNTER — OFFICE VISIT (OUTPATIENT)
Dept: CARDIOLOGY | Facility: CLINIC | Age: 62
End: 2017-11-13
Payer: COMMERCIAL

## 2017-11-13 VITALS
RESPIRATION RATE: 15 BRPM | HEIGHT: 72 IN | WEIGHT: 234.56 LBS | HEART RATE: 73 BPM | OXYGEN SATURATION: 96 % | BODY MASS INDEX: 31.77 KG/M2 | DIASTOLIC BLOOD PRESSURE: 60 MMHG | SYSTOLIC BLOOD PRESSURE: 120 MMHG

## 2017-11-13 DIAGNOSIS — I10 ESSENTIAL HYPERTENSION: ICD-10-CM

## 2017-11-13 DIAGNOSIS — E11.69 DIABETES MELLITUS TYPE 2 IN OBESE: ICD-10-CM

## 2017-11-13 DIAGNOSIS — E66.9 DIABETES MELLITUS TYPE 2 IN OBESE: ICD-10-CM

## 2017-11-13 DIAGNOSIS — E78.5 HYPERLIPIDEMIA, UNSPECIFIED HYPERLIPIDEMIA TYPE: ICD-10-CM

## 2017-11-13 DIAGNOSIS — I10 HTN (HYPERTENSION): ICD-10-CM

## 2017-11-13 DIAGNOSIS — I25.10 CORONARY ARTERY DISEASE INVOLVING NATIVE CORONARY ARTERY OF NATIVE HEART WITHOUT ANGINA PECTORIS: ICD-10-CM

## 2017-11-13 DIAGNOSIS — R07.89 CHEST PAIN, ATYPICAL: Primary | ICD-10-CM

## 2017-11-13 PROCEDURE — 93010 ELECTROCARDIOGRAM REPORT: CPT | Mod: S$GLB,,, | Performed by: INTERNAL MEDICINE

## 2017-11-13 PROCEDURE — 99214 OFFICE O/P EST MOD 30 MIN: CPT | Mod: S$GLB,,, | Performed by: INTERNAL MEDICINE

## 2017-11-13 PROCEDURE — 99999 PR PBB SHADOW E&M-EST. PATIENT-LVL IV: CPT | Mod: PBBFAC,,, | Performed by: INTERNAL MEDICINE

## 2017-11-13 NOTE — PROGRESS NOTES
Subjective:    Patient ID:  Edel Edwards III is a 62 y.o. male who presents for follow-up of Coronary Artery Disease      HPI     HTN, DM, CAD - TESSA to LAD 12/20/16 12/20/16 [facility] - EDP 17, no LV, LAD tandem proximal 90% lesions, Cx distal 70%, RCA medium with diffuse mid 80%        Description of the findings of the procedure: calcified, fibrotic 90%prox LAD --> 0% by predil with 3.5 angioscore and NC balloon. Placement of a 3.5 x 30 mm TESSA.     He continued to have mostly fatigue symptoms following stent     Stress test 2/14/17  LVEF: 59 %  Impression: NORMAL MYOCARDIAL PERFUSION  1. The perfusion scan is free of evidence for myocardial ischemia or injury.   2. There is a mild to moderate intensity fixed defect in the inferior wall of the left ventricle, secondary to diaphragm attenuation.   3. Resting wall motion is physiologic.   4. Resting LV function is normal.   5. The ventricular volumes are normal at rest and stress.   6. The extracardiac distribution of radioactivity is normal.   7. When compared to the previous study from 12/13/2016, inferior defect appears mostly artifiact now.     Inferior defect appears mostly reversible on my review     Reviewed cath film and stress test with Dr Fried - could be a candidate for PCI of RCA although this is a medium sized diffusely diseased vessel. Given improved symptoms will continue to treat medically for now    Denies CP or SOB  Does get fatigue and BP runs low 90s frequently  EKG NSR - ok       Review of Systems   Constitution: Negative for decreased appetite.   HENT: Negative for ear discharge.    Eyes: Negative for blurred vision.   Endocrine: Negative for polyphagia.   Skin: Negative for nail changes.   Neurological: Negative for aphonia.   Psychiatric/Behavioral: Negative for hallucinations.        Objective:    Physical Exam   Constitutional: He is oriented to person, place, and time. He appears well-developed and well-nourished.   HENT:   Head:  Normocephalic and atraumatic.   Eyes: Conjunctivae are normal. Pupils are equal, round, and reactive to light.   Neck: Normal range of motion. Neck supple.   Cardiovascular: Normal rate, normal heart sounds and intact distal pulses.    Pulmonary/Chest: Effort normal and breath sounds normal.   Abdominal: Soft. Bowel sounds are normal.   Musculoskeletal: Normal range of motion.   Neurological: He is alert and oriented to person, place, and time.   Skin: Skin is warm and dry.         Assessment:       1. Chest pain, atypical    2. Diabetes mellitus type 2 in obese    3. Coronary artery disease involving native coronary artery of native heart without angina pectoris    4. Hyperlipidemia, unspecified hyperlipidemia type    5. Essential hypertension         Plan:       Stop hytrin to see if low BP improves  OV 6 months with echo and exercise myoview

## 2017-11-19 RX ORDER — ISOSORBIDE MONONITRATE 30 MG/1
TABLET, EXTENDED RELEASE ORAL
Qty: 30 TABLET | Refills: 11 | Status: SHIPPED | OUTPATIENT
Start: 2017-11-19 | End: 2018-07-09 | Stop reason: SDUPTHER

## 2017-11-19 RX ORDER — METOPROLOL SUCCINATE 25 MG/1
TABLET, EXTENDED RELEASE ORAL
Qty: 30 TABLET | Refills: 11 | Status: SHIPPED | OUTPATIENT
Start: 2017-11-19 | End: 2018-07-09 | Stop reason: SDUPTHER

## 2017-12-19 RX ORDER — CLOPIDOGREL BISULFATE 75 MG/1
TABLET ORAL
Qty: 38 TABLET | Refills: 11 | Status: SHIPPED | OUTPATIENT
Start: 2017-12-19 | End: 2018-06-21 | Stop reason: SDUPTHER

## 2018-01-12 ENCOUNTER — OFFICE VISIT (OUTPATIENT)
Dept: FAMILY MEDICINE | Facility: CLINIC | Age: 63
End: 2018-01-12
Payer: COMMERCIAL

## 2018-01-12 VITALS
HEIGHT: 72 IN | BODY MASS INDEX: 32.28 KG/M2 | WEIGHT: 238.31 LBS | SYSTOLIC BLOOD PRESSURE: 122 MMHG | TEMPERATURE: 99 F | HEART RATE: 65 BPM | DIASTOLIC BLOOD PRESSURE: 64 MMHG | OXYGEN SATURATION: 96 % | RESPIRATION RATE: 14 BRPM

## 2018-01-12 DIAGNOSIS — R97.20 ELEVATED PSA: ICD-10-CM

## 2018-01-12 DIAGNOSIS — I10 ESSENTIAL HYPERTENSION: Primary | ICD-10-CM

## 2018-01-12 DIAGNOSIS — E11.69 DIABETES MELLITUS TYPE 2 IN OBESE: ICD-10-CM

## 2018-01-12 DIAGNOSIS — B96.89 ACUTE BACTERIAL SINUSITIS: ICD-10-CM

## 2018-01-12 DIAGNOSIS — J01.90 ACUTE BACTERIAL SINUSITIS: ICD-10-CM

## 2018-01-12 DIAGNOSIS — E66.9 DIABETES MELLITUS TYPE 2 IN OBESE: ICD-10-CM

## 2018-01-12 DIAGNOSIS — I25.10 CORONARY ARTERY DISEASE INVOLVING NATIVE CORONARY ARTERY OF NATIVE HEART WITHOUT ANGINA PECTORIS: ICD-10-CM

## 2018-01-12 DIAGNOSIS — E11.9 TYPE 2 DIABETES MELLITUS WITHOUT RETINOPATHY: ICD-10-CM

## 2018-01-12 PROCEDURE — 99214 OFFICE O/P EST MOD 30 MIN: CPT | Mod: S$GLB,,, | Performed by: FAMILY MEDICINE

## 2018-01-12 PROCEDURE — 99999 PR PBB SHADOW E&M-EST. PATIENT-LVL III: CPT | Mod: PBBFAC,,, | Performed by: FAMILY MEDICINE

## 2018-01-12 RX ORDER — DAPAGLIFLOZIN 10 MG/1
TABLET, FILM COATED ORAL
COMMUNITY
Start: 2017-12-29 | End: 2018-01-19 | Stop reason: SDUPTHER

## 2018-01-12 RX ORDER — AZITHROMYCIN 250 MG/1
250 TABLET, FILM COATED ORAL DAILY
Qty: 6 TABLET | Refills: 0 | Status: SHIPPED | OUTPATIENT
Start: 2018-01-12 | End: 2018-07-05

## 2018-01-12 RX ORDER — FLUTICASONE PROPIONATE 50 MCG
1 SPRAY, SUSPENSION (ML) NASAL DAILY
Qty: 16 G | Refills: 12 | Status: SHIPPED | OUTPATIENT
Start: 2018-01-12 | End: 2018-01-19

## 2018-01-12 RX ORDER — TERAZOSIN 2 MG/1
2 CAPSULE ORAL NIGHTLY
COMMUNITY
Start: 2017-12-21 | End: 2022-09-13 | Stop reason: SDUPTHER

## 2018-01-12 NOTE — PROGRESS NOTES
Chief Complaint   Patient presents with    Sinus Problem     patient having sinus       HPI  Edel Edwards III is a 62 y.o. male with multiple medical diagnoses as listed in the medical history and problem list that presents for URI.    URI - denies flu-like, +sinus congestion, meds: OTC meds, alkaseltzer, +mild epistaxis, claritin D, AYR ointment, saline nasal spray.    CAD - followed by Cardiology, Dr. Eaton. Completed Cardiac rehab.     DM2 - Endocrine following, overall doing well.     Pt is known to me and was last seen by me on 1/11/2017.    PAST MEDICAL HISTORY:  Past Medical History:   Diagnosis Date    Anticoagulant long-term use     Plavix and Aspirin    BMI 33.0-33.9,adult     Cataract     Diabetes mellitus type 2 in obese     HTN (hypertension)     Hyperlipidemia     Sciatica        PAST SURGICAL HISTORY:  Past Surgical History:   Procedure Laterality Date    JOINT REPLACEMENT Left 11/19/2015    NONE         SOCIAL HISTORY:  Social History     Social History    Marital status:      Spouse name: N/A    Number of children: N/A    Years of education: N/A     Occupational History    Not on file.     Social History Main Topics    Smoking status: Former Smoker     Quit date: 6/5/2009    Smokeless tobacco: Never Used      Comment: quit 2010    Alcohol use No    Drug use: No    Sexual activity: Yes     Partners: Female     Other Topics Concern    Not on file     Social History Narrative    No narrative on file       FAMILY HISTORY:  Family History   Problem Relation Age of Onset    Hypertension Mother     Stroke Father     No Known Problems Sister     No Known Problems Sister     Diabetes Maternal Aunt     Diabetes Paternal Grandmother     No Known Problems Brother     No Known Problems Maternal Uncle     No Known Problems Paternal Aunt     No Known Problems Paternal Uncle     No Known Problems Maternal Grandmother     No Known Problems Maternal Grandfather     No  Known Problems Paternal Grandfather     Amblyopia Neg Hx     Blindness Neg Hx     Cancer Neg Hx     Cataracts Neg Hx     Glaucoma Neg Hx     Macular degeneration Neg Hx     Retinal detachment Neg Hx     Strabismus Neg Hx     Thyroid disease Neg Hx        ALLERGIES AND MEDICATIONS: updated and reviewed.  Review of patient's allergies indicates:  No Known Allergies  Current Outpatient Prescriptions   Medication Sig Dispense Refill    ACCU-CHEK SMARTVIEW TEST STRIP Strp   1    aspirin (ECOTRIN) 81 MG EC tablet Take 1 tablet (81 mg total) by mouth once daily.  0    atorvastatin (LIPITOR) 40 MG tablet take 1/2 tablet by mouth once daily 30 tablet 0    AZILSARTAN MEDOXOMIL (EDARBI ORAL) Take by mouth.      clopidogrel (PLAVIX) 75 mg tablet take 1 tablet by mouth once daily take 8 tablets ON FIRST DAY 38 tablet 11    clotrimazole-betamethasone 1-0.05% (LOTRISONE) cream Apply topically 2 (two) times daily. 15 g 5    DAPAGLIFLOZIN PROPANEDIOL (FARXIGA ORAL) Take by mouth.      etodolac (LODINE) 500 MG tablet take 1 tablet by mouth twice a day 60 tablet 4    FLUARIX QUAD 6311-9393, PF, 60 mcg (15 mcg x 4)/0.5 mL Syrg inject 0.5 milliliter intramuscularly  0    gabapentin (NEURONTIN) 100 MG capsule Take 400 mg by mouth every evening.       hydrocodone-acetaminophen  mg Tab Take 1 tablet by mouth once as needed.   0    isosorbide mononitrate (IMDUR) 30 MG 24 hr tablet take 1 tablet by mouth once daily 30 tablet 11    loratadine-pseudoephedrine 5-120 mg (CLARITIN-D 12-HOUR) 5-120 mg per tablet Take 1 tablet by mouth 2 (two) times daily.      metformin (GLUCOPHAGE) 1000 MG tablet Take 1,000 mg by mouth 2 (two) times daily with meals.        metoprolol succinate (TOPROL-XL) 25 MG 24 hr tablet take 1 tablet by mouth once daily 30 tablet 11    PNEUMOVAX 23 25 mcg/0.5 mL Syrg inject 0.5 milliliter intramuscularly  0    tamsulosin (FLOMAX) 0.4 mg Cp24 take 1 capsule by mouth daily 90 capsule 3     terazosin (HYTRIN) 2 MG capsule Take 2 mg by mouth every evening.       TRULICITY 0.75 mg/0.5 mL PnIj   0    azithromycin (Z-STEPHENIE) 250 MG tablet Take 1 tablet (250 mg total) by mouth once daily. Take 2 tabs now, then 1 tablet per day for 4 days. 6 tablet 0    FARXIGA 10 mg Tab       fluticasone (FLONASE) 50 mcg/actuation nasal spray 1 spray (50 mcg total) by Each Nare route once daily. 16 g 12     No current facility-administered medications for this visit.        ROS  Review of Systems   Constitutional: Positive for fatigue. Negative for activity change, appetite change and fever.   HENT: Positive for postnasal drip, sinus pressure and sneezing. Negative for congestion, rhinorrhea and sore throat.    Eyes: Positive for itching. Negative for visual disturbance.   Respiratory: Negative for cough, chest tightness, shortness of breath and wheezing.    Cardiovascular: Negative for chest pain.   Gastrointestinal: Negative for abdominal pain, diarrhea, nausea and vomiting.   Endocrine: Negative for polydipsia.   Genitourinary: Negative for dysuria, frequency and urgency.   Musculoskeletal: Negative for back pain, myalgias and neck stiffness.   Skin: Negative for rash.   Neurological: Negative for dizziness, syncope and numbness.   Psychiatric/Behavioral: Negative for agitation and dysphoric mood.       Physical Exam  Vitals:    01/12/18 0758   BP: 122/64   Pulse: 65   Resp: 14   Temp: 98.6 °F (37 °C)    Body mass index is 32.32 kg/m².  Weight: 108.1 kg (238 lb 5.1 oz)   Height: 6' (182.9 cm)     Physical Exam   Constitutional: He is oriented to person, place, and time. He appears well-developed and well-nourished.   HENT:   Head: Normocephalic.   Mouth/Throat: No oropharyngeal exudate.   Erythematous pharynx  Erythematous, edematous nares  Mild dull TMs bilaterally   Eyes: Pupils are equal, round, and reactive to light. No scleral icterus.   Neck: Normal range of motion. Neck supple.   Cardiovascular: Normal rate,  regular rhythm and normal heart sounds.    Pulmonary/Chest: Effort normal and breath sounds normal. No respiratory distress.   Abdominal: Soft. Bowel sounds are normal. There is no tenderness.   Lymphadenopathy:     He has cervical adenopathy.   Neurological: He is alert and oriented to person, place, and time.   Skin: Skin is warm. No rash noted.   Psychiatric: He has a normal mood and affect. His behavior is normal. Judgment and thought content normal.       Health Maintenance       Date Due Completion Date    TETANUS VACCINE 08/07/1972 ---    Pneumococcal PPSV23 (Medium Risk) (1) 08/07/1972 ---    Zoster Vaccine 08/07/2014 ---    Urine Microalbumin 06/16/2016 6/16/2015    Foot Exam 08/02/2017 8/2/2016 (Done)    Override on 8/2/2016: Done    Hemoglobin A1c 12/22/2017 6/22/2017    Override on 8/2/2016: Done (6.2 [facility])    Override on 4/26/2016: Done (6.2 dr [facility])    Eye Exam 03/30/2018 3/30/2017    Lipid Panel 06/22/2018 6/22/2017    Override on 8/2/2016: Done (lipidtcc 144tg 72hdl 48hdl [id number])    Colonoscopy 03/03/2026 3/3/2016 (Done)    Override on 3/3/2016: Done (with [facility] w/)          Assessment & Plan    Essential hypertension, Coronary artery disease involving native coronary artery of native heart without angina pectoris  - Continue current medication regimen as prescribed  - Cont follow up with Cardiology as scheduled  - Overall doing well    Elevated PSA  - Continue current medication regimen as prescribed  - Cont to monitor    Type 2 diabetes mellitus without retinopathy - Both Eyes  - Continue current medication regimen as prescribed    Acute bacterial sinusitis  -     azithromycin (Z-STEPHENIE) 250 MG tablet; Take 1 tablet (250 mg total) by mouth once daily. Take 2 tabs now, then 1 tablet per day for 4 days.  Dispense: 6 tablet; Refill: 0  -     fluticasone (FLONASE) 50 mcg/actuation nasal spray; 1 spray (50 mcg total) by Each Nare route once daily.  Dispense: 16 g; Refill: 12  - Pt  improving at this time  - Printed script if symptoms persist/worsen        Follow-up in about 4 weeks (around 2/9/2018).

## 2018-01-19 ENCOUNTER — OFFICE VISIT (OUTPATIENT)
Dept: FAMILY MEDICINE | Facility: CLINIC | Age: 63
End: 2018-01-19
Payer: COMMERCIAL

## 2018-01-19 VITALS
RESPIRATION RATE: 16 BRPM | HEIGHT: 72 IN | DIASTOLIC BLOOD PRESSURE: 60 MMHG | WEIGHT: 240.94 LBS | HEART RATE: 68 BPM | TEMPERATURE: 99 F | BODY MASS INDEX: 32.63 KG/M2 | OXYGEN SATURATION: 96 % | SYSTOLIC BLOOD PRESSURE: 100 MMHG

## 2018-01-19 DIAGNOSIS — E11.9 TYPE 2 DIABETES MELLITUS WITHOUT RETINOPATHY: ICD-10-CM

## 2018-01-19 DIAGNOSIS — J30.9 ACUTE ALLERGIC RHINITIS, UNSPECIFIED SEASONALITY, UNSPECIFIED TRIGGER: ICD-10-CM

## 2018-01-19 DIAGNOSIS — L02.91 ABSCESS: Primary | ICD-10-CM

## 2018-01-19 PROCEDURE — 99214 OFFICE O/P EST MOD 30 MIN: CPT | Mod: S$GLB,,, | Performed by: FAMILY MEDICINE

## 2018-01-19 PROCEDURE — 99999 PR PBB SHADOW E&M-EST. PATIENT-LVL III: CPT | Mod: PBBFAC,,, | Performed by: FAMILY MEDICINE

## 2018-01-19 RX ORDER — CETIRIZINE HYDROCHLORIDE 10 MG/1
10 TABLET ORAL DAILY
Qty: 90 TABLET | Refills: 3 | Status: SHIPPED | OUTPATIENT
Start: 2018-01-19 | End: 2018-12-13 | Stop reason: SDUPTHER

## 2018-01-19 RX ORDER — SULFAMETHOXAZOLE AND TRIMETHOPRIM 800; 160 MG/1; MG/1
1 TABLET ORAL 2 TIMES DAILY
Qty: 20 TABLET | Refills: 0 | Status: SHIPPED | OUTPATIENT
Start: 2018-01-19 | End: 2018-01-29

## 2018-01-19 NOTE — PROGRESS NOTES
Pneumococcal PPSV23 (Medium Risk) (1) Postpone     Zoster Vaccine No hx chickenpox     Urine Microalbumin Ok to do today; Pending orders      Foot Exam Follow up PCP      Hemoglobin A1c Sign ADDISON with [facility] (Endo)

## 2018-01-19 NOTE — PROGRESS NOTES
"Subjective:       Patient ID: Edel Edwards III is a 62 y.o. male.    Chief Complaint: Abscess (near scrotum months worsen x3 days )    HPI    Onset of  a lump noted 6+ months ago behind his scrotum on his perineum. 3 days ago however, this lump became more painful almost overnight and swollen in size also.  No fevers or chills, no n/v.     Pt also complains of continued nasal congestion and rhinorrhea and postnasal drip ~2 weeks. He ha has a prescription for azithromycin provided by a previous provider which she has not filled because he hasn't felt that it was "quite that bad".       Current Outpatient Prescriptions on File Prior to Visit   Medication Sig Dispense Refill    ACCU-CHEK SMARTVIEW TEST STRIP Strp as needed.   1    aspirin (ECOTRIN) 81 MG EC tablet Take 1 tablet (81 mg total) by mouth once daily.  0    atorvastatin (LIPITOR) 40 MG tablet take 1/2 tablet by mouth once daily 30 tablet 0    AZILSARTAN MEDOXOMIL (EDARBI ORAL) Take by mouth once daily.       clopidogrel (PLAVIX) 75 mg tablet take 1 tablet by mouth once daily take 8 tablets ON FIRST DAY 38 tablet 11    clotrimazole-betamethasone 1-0.05% (LOTRISONE) cream Apply topically 2 (two) times daily. 15 g 5    DAPAGLIFLOZIN PROPANEDIOL (FARXIGA ORAL) Take by mouth once daily.       etodolac (LODINE) 500 MG tablet take 1 tablet by mouth twice a day 60 tablet 4    FLUARIX QUAD 3662-0288, PF, 60 mcg (15 mcg x 4)/0.5 mL Syrg inject 0.5 milliliter intramuscularly  0    gabapentin (NEURONTIN) 100 MG capsule Take 400 mg by mouth every evening.       hydrocodone-acetaminophen  mg Tab Take 1 tablet by mouth once as needed.   0    isosorbide mononitrate (IMDUR) 30 MG 24 hr tablet take 1 tablet by mouth once daily 30 tablet 11    metformin (GLUCOPHAGE) 1000 MG tablet Take 1,000 mg by mouth 2 (two) times daily with meals.        metoprolol succinate (TOPROL-XL) 25 MG 24 hr tablet take 1 tablet by mouth once daily 30 tablet 11    " PNEUMOVAX 23 25 mcg/0.5 mL Syrg inject 0.5 milliliter intramuscularly  0    tamsulosin (FLOMAX) 0.4 mg Cp24 take 1 capsule by mouth daily 90 capsule 3    terazosin (HYTRIN) 2 MG capsule Take 2 mg by mouth every evening.       TRULICITY 0.75 mg/0.5 mL PnIj weekly  0    [DISCONTINUED] fluticasone (FLONASE) 50 mcg/actuation nasal spray 1 spray (50 mcg total) by Each Nare route once daily. 16 g 12    [DISCONTINUED] loratadine-pseudoephedrine 5-120 mg (CLARITIN-D 12-HOUR) 5-120 mg per tablet Take 1 tablet by mouth 2 (two) times daily.      azithromycin (Z-STEPHENIE) 250 MG tablet Take 1 tablet (250 mg total) by mouth once daily. Take 2 tabs now, then 1 tablet per day for 4 days. 6 tablet 0    [DISCONTINUED] FARXIGA 10 mg Tab        No current facility-administered medications on file prior to visit.        Past Medical History:   Diagnosis Date    Anticoagulant long-term use     Plavix and Aspirin    BMI 33.0-33.9,adult     Cataract     Diabetes mellitus type 2 in obese     HTN (hypertension)     Hyperlipidemia     Sciatica        Family History   Problem Relation Age of Onset    Hypertension Mother     Stroke Father     No Known Problems Sister     No Known Problems Sister     Diabetes Maternal Aunt     Diabetes Paternal Grandmother     No Known Problems Brother     No Known Problems Maternal Uncle     No Known Problems Paternal Aunt     No Known Problems Paternal Uncle     No Known Problems Maternal Grandmother     No Known Problems Maternal Grandfather     No Known Problems Paternal Grandfather     Amblyopia Neg Hx     Blindness Neg Hx     Cancer Neg Hx     Cataracts Neg Hx     Glaucoma Neg Hx     Macular degeneration Neg Hx     Retinal detachment Neg Hx     Strabismus Neg Hx     Thyroid disease Neg Hx         reports that he quit smoking about 8 years ago. He has never used smokeless tobacco. He reports that he does not drink alcohol or use drugs.    Review of Systems  see  above  Objective:     Vitals:    01/19/18 1121   BP: 100/60   Pulse: 68   Resp: 16   Temp: 99 °F (37.2 °C)        Physical Exam   Constitutional: He appears well-developed. No distress.   HENT:   Head: Normocephalic and atraumatic.   Eyes: Conjunctivae are normal. No scleral icterus.   Pulmonary/Chest: Effort normal.   Genitourinary:         Neurological: He is alert.   Psychiatric: He has a normal mood and affect. His behavior is normal.   Nursing note and vitals reviewed.      Assessment:       1. Abscess    2. Acute allergic rhinitis, unspecified seasonality, unspecified trigger    3. Type 2 diabetes mellitus without retinopathy - Both Eyes        Plan:       Edel was seen today for abscess.    Diagnoses and all orders for this visit:    Abscess  -     sulfamethoxazole-trimethoprim 800-160mg (BACTRIM DS) 800-160 mg Tab; Take 1 tablet by mouth 2 (two) times daily.  Purulent fluid expressed from the wound today. Sterile 4 x 4's placed over the lesion. Patient given antibiotics due to the location of the abscess and his concurrent diagnosis of diabetes. Goal is to prevent worsening skin infection.    Acute allergic rhinitis, unspecified seasonality, unspecified trigger  -     cetirizine (ZYRTEC) 10 MG tablet; Take 1 tablet (10 mg total) by mouth once daily.  Pts sxs and PE most coincide with AR. Will give pt a trial of cetirizine for relief of pts sxs. Pt asked to allow 2 weeks of consistent use before evaluating the efficacy of cetirizine. Pt to call back if sxs worsen or do not improve in 2 weeks.     DM2 - taken into consideration as above.        Follow-up for as scheduled.    Pt verbalized understanding and agreed with our plan.

## 2018-01-22 DIAGNOSIS — R52 PAIN: ICD-10-CM

## 2018-01-22 RX ORDER — ETODOLAC 500 MG/1
TABLET, FILM COATED ORAL
Qty: 60 TABLET | Refills: 4 | Status: SHIPPED | OUTPATIENT
Start: 2018-01-22 | End: 2018-06-01 | Stop reason: SDUPTHER

## 2018-03-29 DIAGNOSIS — N48.1 BALANITIS: ICD-10-CM

## 2018-03-29 RX ORDER — CLOTRIMAZOLE AND BETAMETHASONE DIPROPIONATE 10; .64 MG/G; MG/G
CREAM TOPICAL 2 TIMES DAILY
Qty: 15 G | Refills: 5 | Status: SHIPPED | OUTPATIENT
Start: 2018-03-29 | End: 2018-07-05

## 2018-04-03 ENCOUNTER — OFFICE VISIT (OUTPATIENT)
Dept: OPTOMETRY | Facility: CLINIC | Age: 63
End: 2018-04-03
Payer: COMMERCIAL

## 2018-04-03 DIAGNOSIS — Z98.890 HISTORY OF LASER PHOTOCOAGULATION OF RETINA: ICD-10-CM

## 2018-04-03 DIAGNOSIS — H52.4 MYOPIA WITH ASTIGMATISM AND PRESBYOPIA, BILATERAL: ICD-10-CM

## 2018-04-03 DIAGNOSIS — H52.203 MYOPIA WITH ASTIGMATISM AND PRESBYOPIA, BILATERAL: ICD-10-CM

## 2018-04-03 DIAGNOSIS — H25.13 NUCLEAR SCLEROSIS, BILATERAL: ICD-10-CM

## 2018-04-03 DIAGNOSIS — E11.9 TYPE 2 DIABETES MELLITUS WITHOUT RETINOPATHY: Primary | ICD-10-CM

## 2018-04-03 DIAGNOSIS — H52.13 MYOPIA WITH ASTIGMATISM AND PRESBYOPIA, BILATERAL: ICD-10-CM

## 2018-04-03 PROCEDURE — 99999 PR PBB SHADOW E&M-EST. PATIENT-LVL II: CPT | Mod: PBBFAC,,, | Performed by: OPTOMETRIST

## 2018-04-03 PROCEDURE — 92310 CONTACT LENS FITTING OU: CPT | Mod: ,,, | Performed by: OPTOMETRIST

## 2018-04-03 PROCEDURE — 92014 COMPRE OPH EXAM EST PT 1/>: CPT | Mod: S$GLB,,, | Performed by: OPTOMETRIST

## 2018-04-03 PROCEDURE — 92015 DETERMINE REFRACTIVE STATE: CPT | Mod: S$GLB,,, | Performed by: OPTOMETRIST

## 2018-04-03 RX ORDER — DAPAGLIFLOZIN 10 MG/1
TABLET, FILM COATED ORAL
COMMUNITY
Start: 2018-03-31 | End: 2018-07-05

## 2018-04-03 NOTE — MEDICAL/APP STUDENT
No changes in vision    No gtts    Denies pain    Pt has not been checking blood sugar. See Endocrinologist in 1 month. Last seen in Feb    Hemoglobin A1C   Date Value Ref Range Status   06/22/2017 5.7 (H) 4.0 - 5.6 % Final     Comment:     According to ADA guidelines, hemoglobin A1c <7.0% represents  optimal control in non-pregnant diabetic patients. Different  metrics may apply to specific patient populations.   Standards of Medical Care in Diabetes-2016.  For the purpose of screening for the presence of diabetes:  <5.7%     Consistent with the absence of diabetes  5.7-6.4%  Consistent with increasing risk for diabetes   (prediabetes)  >or=6.5%  Consistent with diabetes  Currently, no consensus exists for use of hemoglobin A1c  for diagnosis of diabetes for children.  This Hemoglobin A1c assay has significant interference with fetal   hemoglobin   (HbF). The results are invalid for patients with abnormal amounts of   HbF,   including those with known Hereditary Persistence   of Fetal Hemoglobin. Heterozygous hemoglobin variants (HbAS, HbAC,   HbAD, HbAE, HbA2) do not significantly interfere with this assay;   however, presence of multiple variants in a sample may impact the %   interference.     03/27/2017 6.0 4.5 - 6.2 % Final     Comment:     According to ADA guidelines, hemoglobin A1C <7.0% represents  optimal control in non-pregnant diabetic patients.  Different  metrics may apply to specific populations.   Standards of Medical Care in Diabetes - 2016.  For the purpose of screening for the presence of diabetes:  <5.7%     Consistent with the absence of diabetes  5.7-6.4%  Consistent with increasing risk for diabetes   (prediabetes)  >or=6.5%  Consistent with diabetes  Currently no consensus exists for use of hemoglobin A1C  for diagnosis of diabetes for children.     11/30/2016 5.8 4.5 - 6.2 % Final     Comment:     According to ADA guidelines, hemoglobin A1C <7.0% represents  optimal control in non-pregnant  diabetic patients.  Different  metrics may apply to specific populations.   Standards of Medical Care in Diabetes - 2016.  For the purpose of screening for the presence of diabetes:  <5.7%     Consistent with the absence of diabetes  5.7-6.4%  Consistent with increasing risk for diabetes   (prediabetes)  >or=6.5%  Consistent with diabetes  Currently no consensus exists for use of hemoglobin A1C  for diagnosis of diabetes for children.

## 2018-04-03 NOTE — PROGRESS NOTES
HPI     No problems  Has questions about polarized glasses  Diabetic eye exam    Last edited by Henrique Sanchez, OD on 4/3/2018  8:52 AM. (History)            Assessment /Plan     For exam results, see Encounter Report.    Type 2 diabetes mellitus without retinopathy - Both Eyes    Nuclear sclerosis, bilateral    History of laser photocoagulation of retina - Left Eye    Myopia with astigmatism and presbyopia, bilateral      1. No diabetic retinopathy, no csme. Return in 1 year for dilated eye exam.  2. Educated pt on presence of cataracts and effects on vision. No surgery at this time. Recheck in one year.  3. Monitor condition. Patient to report any changes. RTC 1 year recheck.       4. Spec Rx given and Contact lens Rx released to pt. Daily wear only advised, with education to risks of extended wear.  Discussed lens care, compliance and solutions. RTC yearly contact lens follow up.   . Different lens options discussed with patient. RTC 1 year full exam.

## 2018-04-06 RX ORDER — TAMSULOSIN HYDROCHLORIDE 0.4 MG/1
1 CAPSULE ORAL DAILY
Qty: 90 CAPSULE | Refills: 3 | Status: SHIPPED | OUTPATIENT
Start: 2018-04-06 | End: 2019-05-07 | Stop reason: SDUPTHER

## 2018-04-06 NOTE — TELEPHONE ENCOUNTER
----- Message from Branden Washington sent at 4/6/2018 11:42 AM CDT -----  Contact: self  Refill: tamsulosin (FLOMAX) 0.4 mg Cp24. Send to [facility] [id number]  [place] [address]    Contact pt at [phone].    Thanks-

## 2018-06-01 DIAGNOSIS — R52 PAIN: ICD-10-CM

## 2018-06-01 RX ORDER — ETODOLAC 500 MG/1
TABLET, FILM COATED ORAL
Qty: 60 TABLET | Refills: 0 | Status: SHIPPED | OUTPATIENT
Start: 2018-06-01 | End: 2018-07-09 | Stop reason: SDUPTHER

## 2018-06-21 RX ORDER — CLOPIDOGREL BISULFATE 75 MG/1
75 TABLET ORAL DAILY
Qty: 90 TABLET | Refills: 3 | Status: SHIPPED | OUTPATIENT
Start: 2018-06-21 | End: 2018-07-09 | Stop reason: SDUPTHER

## 2018-06-27 ENCOUNTER — HOSPITAL ENCOUNTER (OUTPATIENT)
Dept: CARDIOLOGY | Facility: HOSPITAL | Age: 63
Discharge: HOME | End: 2018-06-27
Attending: INTERNAL MEDICINE
Payer: COMMERCIAL

## 2018-06-27 ENCOUNTER — HOSPITAL ENCOUNTER (OUTPATIENT)
Dept: RADIOLOGY | Facility: HOSPITAL | Age: 63
Discharge: HOME | End: 2018-06-27
Attending: INTERNAL MEDICINE
Payer: COMMERCIAL

## 2018-06-27 DIAGNOSIS — E66.9 DIABETES MELLITUS TYPE 2 IN OBESE: ICD-10-CM

## 2018-06-27 DIAGNOSIS — I25.10 CORONARY ARTERY DISEASE INVOLVING NATIVE CORONARY ARTERY OF NATIVE HEART WITHOUT ANGINA PECTORIS: ICD-10-CM

## 2018-06-27 DIAGNOSIS — I10 ESSENTIAL HYPERTENSION: ICD-10-CM

## 2018-06-27 DIAGNOSIS — E11.69 DIABETES MELLITUS TYPE 2 IN OBESE: ICD-10-CM

## 2018-06-27 DIAGNOSIS — R07.89 CHEST PAIN, ATYPICAL: ICD-10-CM

## 2018-06-27 DIAGNOSIS — E78.5 HYPERLIPIDEMIA, UNSPECIFIED HYPERLIPIDEMIA TYPE: ICD-10-CM

## 2018-06-27 LAB
DIASTOLIC DYSFUNCTION: NO
ESTIMATED PA SYSTOLIC PRESSURE: 32.4
GLOBAL PERICARDIAL EFFUSION: NORMAL
MITRAL VALVE MOBILITY: NORMAL
MITRAL VALVE REGURGITATION: NORMAL
RETIRED EF AND QEF - SEE NOTES: 50 (ref 55–65)
TRICUSPID VALVE REGURGITATION: NORMAL

## 2018-06-27 PROCEDURE — 78452 HT MUSCLE IMAGE SPECT MULT: CPT | Mod: 26,,, | Performed by: INTERNAL MEDICINE

## 2018-06-27 PROCEDURE — 93306 TTE W/DOPPLER COMPLETE: CPT | Mod: 26,,, | Performed by: INTERNAL MEDICINE

## 2018-06-27 PROCEDURE — 93018 CV STRESS TEST I&R ONLY: CPT | Mod: ,,, | Performed by: INTERNAL MEDICINE

## 2018-06-27 PROCEDURE — 93306 TTE W/DOPPLER COMPLETE: CPT

## 2018-06-27 PROCEDURE — 93017 CV STRESS TEST TRACING ONLY: CPT

## 2018-06-27 PROCEDURE — A9502 TC99M TETROFOSMIN: HCPCS

## 2018-06-27 PROCEDURE — 93016 CV STRESS TEST SUPVJ ONLY: CPT | Mod: ,,, | Performed by: INTERNAL MEDICINE

## 2018-06-29 ENCOUNTER — OFFICE VISIT (OUTPATIENT)
Dept: CARDIOLOGY | Facility: CLINIC | Age: 63
End: 2018-06-29
Payer: COMMERCIAL

## 2018-06-29 VITALS
BODY MASS INDEX: 33.14 KG/M2 | HEIGHT: 72 IN | OXYGEN SATURATION: 97 % | WEIGHT: 244.69 LBS | DIASTOLIC BLOOD PRESSURE: 70 MMHG | RESPIRATION RATE: 15 BRPM | SYSTOLIC BLOOD PRESSURE: 104 MMHG | HEART RATE: 67 BPM

## 2018-06-29 DIAGNOSIS — R07.89 CHEST PAIN, ATYPICAL: Primary | ICD-10-CM

## 2018-06-29 DIAGNOSIS — I10 ESSENTIAL HYPERTENSION: ICD-10-CM

## 2018-06-29 DIAGNOSIS — E66.9 DIABETES MELLITUS TYPE 2 IN OBESE: ICD-10-CM

## 2018-06-29 DIAGNOSIS — I25.10 CORONARY ARTERY DISEASE INVOLVING NATIVE CORONARY ARTERY OF NATIVE HEART WITHOUT ANGINA PECTORIS: ICD-10-CM

## 2018-06-29 DIAGNOSIS — E78.5 HYPERLIPIDEMIA, UNSPECIFIED HYPERLIPIDEMIA TYPE: ICD-10-CM

## 2018-06-29 DIAGNOSIS — E11.69 DIABETES MELLITUS TYPE 2 IN OBESE: ICD-10-CM

## 2018-06-29 PROCEDURE — 99214 OFFICE O/P EST MOD 30 MIN: CPT | Mod: S$GLB,,, | Performed by: INTERNAL MEDICINE

## 2018-06-29 PROCEDURE — 99999 PR PBB SHADOW E&M-EST. PATIENT-LVL IV: CPT | Mod: PBBFAC,,, | Performed by: INTERNAL MEDICINE

## 2018-06-29 PROCEDURE — 3078F DIAST BP <80 MM HG: CPT | Mod: CPTII,S$GLB,, | Performed by: INTERNAL MEDICINE

## 2018-06-29 PROCEDURE — 3074F SYST BP LT 130 MM HG: CPT | Mod: CPTII,S$GLB,, | Performed by: INTERNAL MEDICINE

## 2018-06-29 PROCEDURE — 3008F BODY MASS INDEX DOCD: CPT | Mod: CPTII,S$GLB,, | Performed by: INTERNAL MEDICINE

## 2018-06-29 RX ORDER — SODIUM CHLORIDE 9 MG/ML
INJECTION, SOLUTION INTRAVENOUS CONTINUOUS
Status: CANCELLED | OUTPATIENT
Start: 2018-06-29

## 2018-06-29 NOTE — H&P
[facility] [place] - Cardiology  History & Physical    Subjective:      Chief Complaint/Reason for Admission: [facility]    Edel Edwards III is a 62 y.o. male.    HTN, DM, CAD - TESSA to LAD 12/20/16 12/20/16 [facility] - EDP 17, no LV, LAD tandem proximal 90% lesions, Cx distal 70%, RCA medium with diffuse mid 80%        Description of the findings of the procedure: calcified, fibrotic 90%prox LAD --> 0% by predil with 3.5 angioscore and NC balloon. Placement of a 3.5 x 30 mm TESSA.     He continued to have mostly fatigue symptoms following stent    Echo 6/27/18    1 - Low normal to mildly depressed left ventricular systolic function (EF 50-55%).     2 - No wall motion abnormalities.     3 - Concentric remodeling.     4 - Trivial mitral regurgitation.     5 - Trivial tricuspid regurgitation.     6 - The estimated PA systolic pressure is 32 mmHg.      Stress test 6/27/18  Impression: ABNORMAL MYOCARDIAL PERFUSION  1. There is evidence for mild myocardial ischemia in the anterolateral wall of the left ventricle.   2. The perfusion scan is free of evidence for myocardial injury.   3. There is a moderate intensity fixed defect in the inferior wall of the left ventricle, secondary to diaphragm attenuation.   4. Resting wall motion is physiologic.   5. The ventricular volumes are normal at rest and stress.   6. The extracardiac distribution of radioactivity is normal.   7. When compared to the previous study from 02/14/2017, newly noted mild anterolateral ischemia with similar inferior attenuation artifact noted.  8. Ischemic EKG changes noted during ETT.     5/23/17 Reviewed cath film and stress test with Dr Fried - could be a candidate for PCI of RCA although this is a medium sized diffusely diseased vessel. Given improved symptoms will continue to treat medically for now    Mainly having fatigue  Occasional mild CP      Past Medical History:   Diagnosis Date    Anticoagulant long-term use     Plavix and Aspirin    BMI  33.0-33.9,adult     Cataract     Diabetes mellitus type 2 in obese     HTN (hypertension)     Hyperlipidemia     Sciatica      Past Surgical History:   Procedure Laterality Date    JOINT REPLACEMENT Left 11/19/2015    NONE       Family History   Problem Relation Age of Onset    Hypertension Mother     Stroke Father     No Known Problems Sister     No Known Problems Sister     Diabetes Maternal Aunt     Diabetes Paternal Grandmother     No Known Problems Brother     No Known Problems Maternal Uncle     No Known Problems Paternal Aunt     No Known Problems Paternal Uncle     No Known Problems Maternal Grandmother     No Known Problems Maternal Grandfather     No Known Problems Paternal Grandfather     Amblyopia Neg Hx     Blindness Neg Hx     Cancer Neg Hx     Cataracts Neg Hx     Glaucoma Neg Hx     Macular degeneration Neg Hx     Retinal detachment Neg Hx     Strabismus Neg Hx     Thyroid disease Neg Hx      Social History   Substance Use Topics    Smoking status: Former Smoker     Quit date: 6/5/2009    Smokeless tobacco: Never Used      Comment: quit 2010    Alcohol use No         (Not in a hospital admission)  Review of patient's allergies indicates:  No Known Allergies     Review of Systems   All other systems reviewed and are negative.      Objective:      Vital Signs (Most Recent)  Pulse: 67 (06/29/18 0918)  Resp: 15 (06/29/18 0918)  BP: 104/70 (06/29/18 0918)  SpO2: 97 % (06/29/18 0918)    Vital Signs Range (Last 24H):  [unfilled]    Physical Exam   Constitutional: He is oriented to person, place, and time. He appears well-developed and well-nourished.   HENT:   Head: Normocephalic and atraumatic.   Eyes: EOM are normal. Pupils are equal, round, and reactive to light.   Neck: Normal range of motion. Neck supple.   Cardiovascular: Normal rate and regular rhythm.    Pulmonary/Chest: Effort normal and breath sounds normal.   Abdominal: Soft. Bowel sounds are normal.    Musculoskeletal: Normal range of motion.   Neurological: He is alert and oriented to person, place, and time.   Skin: Skin is warm and dry.       Data Review:    CBC:   Lab Results   Component Value Date    WBC 6.09 06/22/2017    RBC 4.88 06/22/2017    HGB 14.6 06/22/2017    HCT 44.5 06/22/2017     06/22/2017     BMP:   Lab Results   Component Value Date     (H) 12/21/2016     12/21/2016    K 3.7 12/21/2016     12/21/2016    CO2 22 (L) 12/21/2016    BUN 14 12/21/2016    CREATININE 0.8 12/21/2016    CALCIUM 8.9 12/21/2016     Coagulation:   Lab Results   Component Value Date    INR 0.9 12/19/2016    APTT 27.8 12/19/2016      ECG: normal sinus rhythm, no blocks or conduction defects, no ischemic changes.     Assessment:      There are no hospital problems to display for this patient.    Abnormal stress test with known CAD on ASA/plavix and 2 anti-anginal medications    Plan:      [place]

## 2018-06-29 NOTE — PROGRESS NOTES
Subjective:    Patient ID:  Edel Edwards III is a 62 y.o. male who presents for follow-up of Results      HPI        HTN, DM, CAD - TESSA to LAD 12/20/16 12/20/16 [facility] - EDP 17, no LV, LAD tandem proximal 90% lesions, Cx distal 70%, RCA medium with diffuse mid 80%        Description of the findings of the procedure: calcified, fibrotic 90%prox LAD --> 0% by predil with 3.5 angioscore and NC balloon. Placement of a 3.5 x 30 mm TESSA.     He continued to have mostly fatigue symptoms following stent    Echo 6/27/18    1 - Low normal to mildly depressed left ventricular systolic function (EF 50-55%).     2 - No wall motion abnormalities.     3 - Concentric remodeling.     4 - Trivial mitral regurgitation.     5 - Trivial tricuspid regurgitation.     6 - The estimated PA systolic pressure is 32 mmHg.      Stress test 6/27/18  Impression: ABNORMAL MYOCARDIAL PERFUSION  1. There is evidence for mild myocardial ischemia in the anterolateral wall of the left ventricle.   2. The perfusion scan is free of evidence for myocardial injury.   3. There is a moderate intensity fixed defect in the inferior wall of the left ventricle, secondary to diaphragm attenuation.   4. Resting wall motion is physiologic.   5. The ventricular volumes are normal at rest and stress.   6. The extracardiac distribution of radioactivity is normal.   7. When compared to the previous study from 02/14/2017, newly noted mild anterolateral ischemia with similar inferior attenuation artifact noted.  8. Ischemic EKG changes noted during ETT.     5/23/17 Reviewed cath film and stress test with Dr Fried - could be a candidate for PCI of RCA although this is a medium sized diffusely diseased vessel. Given improved symptoms will continue to treat medically for now    Mainly having fatigue  Occasional mild CP       Review of Systems   Constitution: Negative for decreased appetite.   HENT: Negative for ear discharge.    Eyes: Negative for blurred vision.    Endocrine: Negative for polyphagia.   Skin: Negative for nail changes.   Neurological: Negative for aphonia.   Psychiatric/Behavioral: Negative for hallucinations.        Objective:    Physical Exam   Constitutional: He is oriented to person, place, and time. He appears well-developed and well-nourished.   HENT:   Head: Normocephalic and atraumatic.   Eyes: Conjunctivae are normal. Pupils are equal, round, and reactive to light.   Neck: Normal range of motion. Neck supple.   Cardiovascular: Normal rate, normal heart sounds and intact distal pulses.    Pulmonary/Chest: Effort normal and breath sounds normal.   Abdominal: Soft. Bowel sounds are normal.   Musculoskeletal: Normal range of motion.   Neurological: He is alert and oriented to person, place, and time.   Skin: Skin is warm and dry.         Assessment:       1. Chest pain, atypical    2. Essential hypertension    3. Hyperlipidemia, unspecified hyperlipidemia type    4. Coronary artery disease involving native coronary artery of native heart without angina pectoris    5. Diabetes mellitus type 2 in obese         Plan:       Known CAD with abnormal stress test on 2 anti-anginal medications  [facility] - risks/benefits explained and he agrees to proceed

## 2018-07-05 ENCOUNTER — HOSPITAL ENCOUNTER (OUTPATIENT)
Dept: PREADMISSION TESTING | Facility: HOSPITAL | Age: 63
Discharge: HOME | End: 2018-07-05
Attending: INTERNAL MEDICINE
Payer: COMMERCIAL

## 2018-07-05 VITALS
SYSTOLIC BLOOD PRESSURE: 111 MMHG | BODY MASS INDEX: 32.64 KG/M2 | DIASTOLIC BLOOD PRESSURE: 67 MMHG | HEART RATE: 60 BPM | WEIGHT: 241 LBS | OXYGEN SATURATION: 96 % | RESPIRATION RATE: 18 BRPM | HEIGHT: 72 IN

## 2018-07-05 DIAGNOSIS — R07.89 CHEST PAIN, ATYPICAL: ICD-10-CM

## 2018-07-05 DIAGNOSIS — Z01.818 PRE-OP TESTING: Primary | ICD-10-CM

## 2018-07-05 DIAGNOSIS — I10 ESSENTIAL HYPERTENSION: ICD-10-CM

## 2018-07-05 DIAGNOSIS — E11.69 DIABETES MELLITUS TYPE 2 IN OBESE: ICD-10-CM

## 2018-07-05 DIAGNOSIS — E66.9 DIABETES MELLITUS TYPE 2 IN OBESE: ICD-10-CM

## 2018-07-05 DIAGNOSIS — I25.10 CORONARY ARTERY DISEASE INVOLVING NATIVE CORONARY ARTERY OF NATIVE HEART WITHOUT ANGINA PECTORIS: ICD-10-CM

## 2018-07-05 DIAGNOSIS — E78.5 HYPERLIPIDEMIA, UNSPECIFIED HYPERLIPIDEMIA TYPE: ICD-10-CM

## 2018-07-05 LAB
ANION GAP SERPL CALC-SCNC: 9 MMOL/L
APTT BLDCRRT: 28.1 SEC
BUN SERPL-MCNC: 26 MG/DL
CALCIUM SERPL-MCNC: 10.2 MG/DL
CHLORIDE SERPL-SCNC: 109 MMOL/L
CO2 SERPL-SCNC: 20 MMOL/L
CREAT SERPL-MCNC: 1.2 MG/DL
ERYTHROCYTE [DISTWIDTH] IN BLOOD BY AUTOMATED COUNT: 12.3 %
EST. GFR  (AFRICAN AMERICAN): >60 ML/MIN/1.73 M^2
EST. GFR  (NON AFRICAN AMERICAN): >60 ML/MIN/1.73 M^2
GLUCOSE SERPL-MCNC: 153 MG/DL
HCT VFR BLD AUTO: 46 %
HGB BLD-MCNC: 16 G/DL
INR PPP: 0.9
MCH RBC QN AUTO: 30.6 PG
MCHC RBC AUTO-ENTMCNC: 34.8 G/DL
MCV RBC AUTO: 88 FL
PLATELET # BLD AUTO: 196 K/UL
PMV BLD AUTO: 9.8 FL
POTASSIUM SERPL-SCNC: 4.9 MMOL/L
PROTHROMBIN TIME: 9.7 SEC
RBC # BLD AUTO: 5.23 M/UL
SODIUM SERPL-SCNC: 138 MMOL/L
WBC # BLD AUTO: 6 K/UL

## 2018-07-05 PROCEDURE — 36415 COLL VENOUS BLD VENIPUNCTURE: CPT

## 2018-07-05 PROCEDURE — 85730 THROMBOPLASTIN TIME PARTIAL: CPT

## 2018-07-05 PROCEDURE — 80048 BASIC METABOLIC PNL TOTAL CA: CPT

## 2018-07-05 PROCEDURE — 85610 PROTHROMBIN TIME: CPT

## 2018-07-05 PROCEDURE — 85027 COMPLETE CBC AUTOMATED: CPT

## 2018-07-05 PROCEDURE — 93010 ELECTROCARDIOGRAM REPORT: CPT | Mod: ,,, | Performed by: INTERNAL MEDICINE

## 2018-07-05 PROCEDURE — 93005 ELECTROCARDIOGRAM TRACING: CPT

## 2018-07-05 NOTE — DISCHARGE INSTRUCTIONS
Your angiogram is scheduled for___7/6/2018____________    Call [phone] between 2 pm and 5 pm on __today__________to find out your arrival time for the day of your procedure.    You will go directly to Same Day Surgery on the 2nd floor of the hospital on the day of your procedure at __________    If you need wheelchair assistance, call [phone] from the lobby using your cell phone.  Or you may use the courtesy phone in the lobby.  The  will direct your call to the Same Day Surgery unit.    IMPORTANT INSTRUCTIONS:  Do not eat or drink anything after midnight.  This includes water and coffee.  It is okay to brush your teeth.  Do not chew gum or have hard candy or mints.    Take your morning medications with small sips of water.        Wash both groins with Hibiclens on the night before your angiogram, and on the morning of your angiogram.  If your doctor has told you that the wrist area will be used for the procedure, wash both wrists.  Be sure to rinse it off.  Do not put Hibiclens directly on your genitals or face.     You may wear deodorant, but do not wear body lotion, powder or cologne       Do not wear jewelry.     You do not need money or valuables.  You may bring your cell phone.     If you are going home the same day, you must arrange for someone to drive you home.     Wear comfortable, loose fitting clothes.     Do not take any diabetic medication.  Metformin should be held for 2 days before the angiogram.     Call your doctor if you have sickness or fever before your angiogram.     Our number in [facility] is [phone] if you need to contact us.

## 2018-07-06 ENCOUNTER — SURGERY (OUTPATIENT)
Age: 63
End: 2018-07-06

## 2018-07-06 ENCOUNTER — HOSPITAL ENCOUNTER (OUTPATIENT)
Facility: HOSPITAL | Age: 63
Discharge: HOME | End: 2018-07-06
Attending: INTERNAL MEDICINE | Admitting: INTERNAL MEDICINE
Payer: COMMERCIAL

## 2018-07-06 VITALS
HEIGHT: 72 IN | WEIGHT: 241 LBS | TEMPERATURE: 98 F | BODY MASS INDEX: 32.64 KG/M2 | RESPIRATION RATE: 18 BRPM | SYSTOLIC BLOOD PRESSURE: 100 MMHG | OXYGEN SATURATION: 100 % | DIASTOLIC BLOOD PRESSURE: 55 MMHG | HEART RATE: 51 BPM

## 2018-07-06 DIAGNOSIS — R97.20 ELEVATED PSA: ICD-10-CM

## 2018-07-06 DIAGNOSIS — E78.5 HYPERLIPIDEMIA, UNSPECIFIED HYPERLIPIDEMIA TYPE: ICD-10-CM

## 2018-07-06 DIAGNOSIS — Z01.818 PRE-OP TESTING: ICD-10-CM

## 2018-07-06 DIAGNOSIS — R07.89 CHEST PAIN, ATYPICAL: ICD-10-CM

## 2018-07-06 DIAGNOSIS — E11.9 TYPE 2 DIABETES MELLITUS WITHOUT RETINOPATHY: Primary | ICD-10-CM

## 2018-07-06 DIAGNOSIS — I10 ESSENTIAL HYPERTENSION: ICD-10-CM

## 2018-07-06 DIAGNOSIS — E11.69 DIABETES MELLITUS TYPE 2 IN OBESE: ICD-10-CM

## 2018-07-06 DIAGNOSIS — E66.9 DIABETES MELLITUS TYPE 2 IN OBESE: ICD-10-CM

## 2018-07-06 DIAGNOSIS — I25.10 CORONARY ARTERY DISEASE INVOLVING NATIVE CORONARY ARTERY OF NATIVE HEART WITHOUT ANGINA PECTORIS: ICD-10-CM

## 2018-07-06 DIAGNOSIS — H25.13 NUCLEAR SCLEROSIS OF BOTH EYES: ICD-10-CM

## 2018-07-06 LAB — POCT GLUCOSE: 117 MG/DL (ref 70–110)

## 2018-07-06 PROCEDURE — 25000003 PHARM REV CODE 250

## 2018-07-06 PROCEDURE — 99152 MOD SED SAME PHYS/QHP 5/>YRS: CPT

## 2018-07-06 PROCEDURE — 93458 L HRT ARTERY/VENTRICLE ANGIO: CPT

## 2018-07-06 PROCEDURE — 99152 MOD SED SAME PHYS/QHP 5/>YRS: CPT | Mod: ,,, | Performed by: INTERNAL MEDICINE

## 2018-07-06 PROCEDURE — 63600175 PHARM REV CODE 636 W HCPCS

## 2018-07-06 PROCEDURE — 93458 L HRT ARTERY/VENTRICLE ANGIO: CPT | Mod: 26,,, | Performed by: INTERNAL MEDICINE

## 2018-07-06 RX ORDER — ACETAMINOPHEN 325 MG/1
650 TABLET ORAL EVERY 4 HOURS PRN
Status: DISCONTINUED | OUTPATIENT
Start: 2018-07-06 | End: 2018-07-06 | Stop reason: HOSPADM

## 2018-07-06 RX ORDER — HYDROCODONE BITARTRATE AND ACETAMINOPHEN 5; 325 MG/1; MG/1
1 TABLET ORAL EVERY 4 HOURS PRN
Status: DISCONTINUED | OUTPATIENT
Start: 2018-07-06 | End: 2018-07-06 | Stop reason: HOSPADM

## 2018-07-06 RX ORDER — SODIUM CHLORIDE 9 MG/ML
INJECTION, SOLUTION INTRAVENOUS CONTINUOUS
Status: DISCONTINUED | OUTPATIENT
Start: 2018-07-06 | End: 2018-07-06 | Stop reason: HOSPADM

## 2018-07-06 RX ORDER — NITROGLYCERIN 0.4 MG/1
0.4 TABLET SUBLINGUAL EVERY 5 MIN PRN
Status: DISCONTINUED | OUTPATIENT
Start: 2018-07-06 | End: 2018-07-06 | Stop reason: HOSPADM

## 2018-07-06 NOTE — HOSPITAL COURSE
"7/6/18 [facility] - EDP 6, LAD stent widely patent, D1  Osteal 90% - "stent detention" - medium vessel, Cx distal 70% - small distal vessel, RCA medium size with diffuse mid 70%     Reviewed with Dr Whaley  Continue with medical Rx  Angioseal  Home today  "

## 2018-07-06 NOTE — HPI
HTN, DM, CAD - TESSA to LAD 12/20/16 12/20/16 [facility] - EDP 17, no LV, LAD tandem proximal 90% lesions, Cx distal 70%, RCA medium with diffuse mid 80%        Description of the findings of the procedure: calcified, fibrotic 90%prox LAD --> 0% by predil with 3.5 angioscore and NC balloon. Placement of a 3.5 x 30 mm TESSA.     He continued to have mostly fatigue symptoms following stent     Echo 6/27/18    1 - Low normal to mildly depressed left ventricular systolic function (EF 50-55%).     2 - No wall motion abnormalities.     3 - Concentric remodeling.     4 - Trivial mitral regurgitation.     5 - Trivial tricuspid regurgitation.     6 - The estimated PA systolic pressure is 32 mmHg.      Stress test 6/27/18  Impression: ABNORMAL MYOCARDIAL PERFUSION  1. There is evidence for mild myocardial ischemia in the anterolateral wall of the left ventricle.   2. The perfusion scan is free of evidence for myocardial injury.   3. There is a moderate intensity fixed defect in the inferior wall of the left ventricle, secondary to diaphragm attenuation.   4. Resting wall motion is physiologic.   5. The ventricular volumes are normal at rest and stress.   6. The extracardiac distribution of radioactivity is normal.   7. When compared to the previous study from 02/14/2017, newly noted mild anterolateral ischemia with similar inferior attenuation artifact noted.  8. Ischemic EKG changes noted during ETT.     5/23/17 Reviewed cath film and stress test with Dr Fried - could be a candidate for PCI of RCA although this is a medium sized diffusely diseased vessel. Given improved symptoms will continue to treat medically for now     Mainly having fatigue  Occasional mild CP

## 2018-07-06 NOTE — H&P
[facility] - Cardiology  History & Physical    Subjective:      Chief Complaint/Reason for Admission: [facility]    Edel Edwards III is a 62 y.o. male.    HTN, DM, CAD - TESSA to LAD 12/20/16 12/20/16 [facility] - EDP 17, no LV, LAD tandem proximal 90% lesions, Cx distal 70%, RCA medium with diffuse mid 80%        Description of the findings of the procedure: calcified, fibrotic 90%prox LAD --> 0% by predil with 3.5 angioscore and NC balloon. Placement of a 3.5 x 30 mm TESSA.     He continued to have mostly fatigue symptoms following stent    Echo 6/27/18    1 - Low normal to mildly depressed left ventricular systolic function (EF 50-55%).     2 - No wall motion abnormalities.     3 - Concentric remodeling.     4 - Trivial mitral regurgitation.     5 - Trivial tricuspid regurgitation.     6 - The estimated PA systolic pressure is 32 mmHg.      Stress test 6/27/18  Impression: ABNORMAL MYOCARDIAL PERFUSION  1. There is evidence for mild myocardial ischemia in the anterolateral wall of the left ventricle.   2. The perfusion scan is free of evidence for myocardial injury.   3. There is a moderate intensity fixed defect in the inferior wall of the left ventricle, secondary to diaphragm attenuation.   4. Resting wall motion is physiologic.   5. The ventricular volumes are normal at rest and stress.   6. The extracardiac distribution of radioactivity is normal.   7. When compared to the previous study from 02/14/2017, newly noted mild anterolateral ischemia with similar inferior attenuation artifact noted.  8. Ischemic EKG changes noted during ETT.     5/23/17 Reviewed cath film and stress test with Dr Fried - could be a candidate for PCI of RCA although this is a medium sized diffusely diseased vessel. Given improved symptoms will continue to treat medically for now    Mainly having fatigue  Occasional mild CP      Past Medical History:   Diagnosis Date    Anticoagulant long-term use     Plavix and Aspirin    BMI  33.0-33.9,adult     Cataract     Coronary artery disease     Diabetes mellitus type 2 in obese     HTN (hypertension)     Hyperlipidemia     Sciatica      Past Surgical History:   Procedure Laterality Date    coronary stents      JOINT REPLACEMENT Left 11/19/2015    NONE       Family History   Problem Relation Age of Onset    Hypertension Mother     Stroke Father     No Known Problems Sister     No Known Problems Sister     Diabetes Maternal Aunt     Diabetes Paternal Grandmother     No Known Problems Brother     No Known Problems Maternal Uncle     No Known Problems Paternal Aunt     No Known Problems Paternal Uncle     No Known Problems Maternal Grandmother     No Known Problems Maternal Grandfather     No Known Problems Paternal Grandfather     Amblyopia Neg Hx     Blindness Neg Hx     Cancer Neg Hx     Cataracts Neg Hx     Glaucoma Neg Hx     Macular degeneration Neg Hx     Retinal detachment Neg Hx     Strabismus Neg Hx     Thyroid disease Neg Hx      Social History   Substance Use Topics    Smoking status: Former Smoker     Quit date: 6/5/2009    Smokeless tobacco: Never Used      Comment: quit 2010    Alcohol use No       PTA Medications   Medication Sig    atorvastatin (LIPITOR) 40 MG tablet take 1/2 tablet by mouth once daily    AZILSARTAN MEDOXOMIL (EDARBI ORAL) Take by mouth once daily.     cetirizine (ZYRTEC) 10 MG tablet Take 1 tablet (10 mg total) by mouth once daily.    clopidogrel (PLAVIX) 75 mg tablet Take 1 tablet (75 mg total) by mouth once daily.    DAPAGLIFLOZIN PROPANEDIOL (FARXIGA ORAL) Take by mouth once daily.     etodolac (LODINE) 500 MG tablet take 1 tablet by mouth twice a day    gabapentin (NEURONTIN) 100 MG capsule Take 400 mg by mouth every evening.     isosorbide mononitrate (IMDUR) 30 MG 24 hr tablet take 1 tablet by mouth once daily    metformin (GLUCOPHAGE) 1000 MG tablet Take 1,000 mg by mouth 2 (two) times daily with meals.       metoprolol succinate (TOPROL-XL) 25 MG 24 hr tablet take 1 tablet by mouth once daily    tamsulosin (FLOMAX) 0.4 mg Cp24 Take 1 capsule (0.4 mg total) by mouth once daily.    terazosin (HYTRIN) 2 MG capsule Take 2 mg by mouth every evening.     ACCU-CHEK SMARTVIEW TEST STRIP Strp as needed.     aspirin (ECOTRIN) 81 MG EC tablet Take 1 tablet (81 mg total) by mouth once daily.    TRULICITY 0.75 mg/0.5 mL PnIj weekly     Review of patient's allergies indicates:  No Known Allergies     Review of Systems   All other systems reviewed and are negative.      Objective:      Vital Signs (Most Recent)  Temp: 97.1 °F (36.2 °C) (07/06/18 0900)  Pulse: 61 (07/06/18 0900)  Resp: 20 (07/06/18 0900)  BP: (!) 110/52 (07/06/18 0900)  SpO2: 96 % (07/06/18 0900)    Vital Signs Range (Last 24H):  [unfilled]    Physical Exam   Constitutional: He is oriented to person, place, and time. He appears well-developed and well-nourished.   HENT:   Head: Normocephalic and atraumatic.   Eyes: EOM are normal. Pupils are equal, round, and reactive to light.   Neck: Normal range of motion. Neck supple.   Cardiovascular: Normal rate and regular rhythm.    Pulmonary/Chest: Effort normal and breath sounds normal.   Abdominal: Soft. Bowel sounds are normal.   Musculoskeletal: Normal range of motion.   Neurological: He is alert and oriented to person, place, and time.   Skin: Skin is warm and dry.       Data Review:    CBC:   Lab Results   Component Value Date    WBC 6.00 07/05/2018    RBC 5.23 07/05/2018    HGB 16.0 07/05/2018    HCT 46.0 07/05/2018     07/05/2018     BMP:   Lab Results   Component Value Date     (H) 07/05/2018     07/05/2018    K 4.9 07/05/2018     07/05/2018    CO2 20 (L) 07/05/2018    BUN 26 (H) 07/05/2018    CREATININE 1.2 07/05/2018    CALCIUM 10.2 07/05/2018     Coagulation:   Lab Results   Component Value Date    INR 0.9 07/05/2018    APTT 28.1 07/05/2018      ECG: normal sinus rhythm, no  blocks or conduction defects, no ischemic changes.     Assessment:      Active Hospital Problems    Diagnosis  POA    Chest pain, atypical [R07.89]  Yes      Resolved Hospital Problems    Diagnosis Date Resolved POA   No resolved problems to display.     Abnormal stress test with known CAD on ASA/plavix and 2 anti-anginal medications    Plan:      [facility]

## 2018-07-06 NOTE — PROCEDURES
"Edel Edawrds III is a 62 y.o. male patient.    Temp: 97.1 °F (36.2 °C) (07/06/18 0900)  Pulse: 61 (07/06/18 0900)  Resp: 20 (07/06/18 0900)  BP: (!) 110/52 (07/06/18 0900)  SpO2: 96 % (07/06/18 0900)  Weight: 109.3 kg (241 lb) (07/05/18 1404)  Height: 6' (182.9 cm) (07/05/18 1404)       Procedures     [facility]   Dr Eaton  Pre-op Dx CAD  Post-op Dx same  Specimen none  EBL < 50 cc    7/6/18 [facility] - EDP 6, LAD stent widely patent, D1  Osteal 90% - "stent snf" - medium vessel, Cx distal 70% - small distal vessel, RCA medium size with diffuse mid 70%    Reviewed with Dr Whaley  Continue with medical Rx  Angioseal  Home today    Teja Eaton  7/6/2018  "

## 2018-07-06 NOTE — ASSESSMENT & PLAN NOTE
"7/6/18 [facility] - EDP 6, LAD stent widely patent, D1  Osteal 90% - "stent halfway" - medium vessel, Cx distal 70% - small distal vessel, RCA medium size with diffuse mid 70%     Reviewed with Dr Whaley  Continue with medical Rx  Angioseal  Home today  "

## 2018-07-06 NOTE — DISCHARGE INSTRUCTIONS
ANGIOGRAM INSTRUCTIONS                                           Drink plenty of fluids for the next 48 hours and follow your doctor's diet orders.    Rest for the next 72 hours.   Try not to keep the injected leg bent for a long period of time.  Remove the dressing in 24 hours, and you may shower. Clean the area with soap and water, and apply a band aid for the next 5 days.                                                                 No  Lifting over 5-10 lbs., that is, not more than 1 gallon of water, or straining for 72 hours.    No driving, no drinking alcohol, and no signing legal documents for the next 24 hours.    Call your doctor for elevated temperature, shortness of breath, chest pain, or cold discolored foot or leg.    If oozing occurs at the injections site, lie down.  Apply pressure with a clean wash cloth for 20 to 30 minutes and call your doctor.    If severe bleeding occurs, lie down, apply pressure.  Call 911 and request an ambulance to take you to the nearest hospital emergency room.    Continue to take your regular medications as instructed.     Vascular Closure Device     -Re apply a clean, dry band aid and every day for five days or until a scab has formed at the site.  Change the band aid as needed  -Keep the site dry and clean.  -You may shower 24 hours after the procedure, but do not bathe or use a pool until the wound had completely closed.  -Gently clean your puncture site with soap and warm water.  -After showering , gently pat-dry the site with a clean towel; then let the site air dry before covering with a band aid  -Limit tight fitting clothes or underwear that my irritate the puncture site until the site has healed.    Daily Activities    Do not drive, drink alcohol, or sign legal documents for 24 hours, or if taking narcotic pain medication.    Day of discharge  -No driving  Modify activity for 3-5 days  -No heavy lifting of anything over 5 pounds  (equivalent to a 1/2 gallon  of milk)  -No pushing or pulling.  -No vigorous activity or straining  -Avoid stairs unless necessary : if necessary, take them slowly.  -Coughing, sneezing, or straining for a bowel movement:  Support your groin by pressing with your palm on top of the dressing/bandage.  -Sexual activity : check with your doctor  -No strenuous exercise.  -Avoid driving unless necessary.  Talk to your doctor about returning back to work, which depends on your type of work., your procedure and any medication you might be taking.      Follow the instructions in the handout given to you.       Fall Prevention  Millions of people fall every year and injure themselves. You may have had anesthesia or sedation which may increase your risk of falling. You may have health issues that put you at an increased risk of falling.     Here are ways to reduce your risk of falling.  ·   · Make your home safe by keeping walkways clear of objects you may trip over.  · Use non-slip pads under rugs. Do not use area rugs or small throw rugs.  · Use non-slip mats in bathtubs and showers.  · Install handrails and lights on staircases.  · Do not walk in poorly lit areas.  · Do not stand on chairs or wobbly ladders.  · Use caution when reaching overhead or looking upward. This position can cause a loss of balance.  · Be sure your shoes fit properly, have non-slip bottoms and are in good condition.   · Wear shoes both inside and out. Avoid going barefoot or wearing slippers.  · Be cautious when going up and down stairs, curbs, and when walking on uneven sidewalks.  · If your balance is poor, consider using a cane or walker.  · If your fall was related to alcohol use, stop or limit alcohol intake.   · If your fall was related to use of sleeping medicines, talk to your doctor about this. You may need to reduce your dosage at bedtime if you awaken during the night to go to the bathroom.    · To reduce the need for nighttime bathroom trips:  ¨ Avoid drinking  fluids for several hours before going to bed  ¨ Empty your bladder before going to bed  ¨ Men can keep a urinal at the bedside  · Stay as active as you can. Balance, flexibility, strength, and endurance all come from exercise. They all play a role in preventing falls. Ask your healthcare provider which types of activity are right for you.  · Get your vision checked on a regular basis.  · If you have pets, know where they are before you stand up or walk so you don't trip over them.  · Use night lights.

## 2018-07-06 NOTE — PROCEDURES
Edel Edwards III is a 62 y.o. male patient.    Temp: 97.1 °F (36.2 °C) (07/06/18 0900)  Pulse: 61 (07/06/18 0900)  Resp: 20 (07/06/18 0900)  BP: (!) 110/52 (07/06/18 0900)  SpO2: 96 % (07/06/18 0900)  Weight: 109.3 kg (241 lb) (07/05/18 1404)  Height: 6' (182.9 cm) (07/05/18 1404)       Procedures     Pre-sedation Assessment:    1. ASA Score: ASA 2 - Patient with mild systemic disease with no functional limitations  2. Mallampati Class: II (hard and soft palate, upper portion of tonsils anduvula visible)  3. Patient or family history of any reaction to anesthesia or sedation: No  4. Plan for Sedation: Moderate  5. H&P within 30 days of the procedure and updated within 24 hrs of admission or registration: Yes    Teja Eaton  7/6/2018

## 2018-07-09 ENCOUNTER — PATIENT MESSAGE (OUTPATIENT)
Dept: CARDIOLOGY | Facility: CLINIC | Age: 63
End: 2018-07-09

## 2018-07-09 ENCOUNTER — PATIENT MESSAGE (OUTPATIENT)
Dept: FAMILY MEDICINE | Facility: CLINIC | Age: 63
End: 2018-07-09

## 2018-07-09 ENCOUNTER — PATIENT MESSAGE (OUTPATIENT)
Dept: UROLOGY | Facility: CLINIC | Age: 63
End: 2018-07-09

## 2018-07-09 DIAGNOSIS — E11.9 TYPE 2 DIABETES MELLITUS WITHOUT COMPLICATION: ICD-10-CM

## 2018-07-09 DIAGNOSIS — E78.5 HYPERLIPIDEMIA, UNSPECIFIED HYPERLIPIDEMIA TYPE: ICD-10-CM

## 2018-07-09 DIAGNOSIS — R52 PAIN: ICD-10-CM

## 2018-07-09 DIAGNOSIS — R21 PENILE RASH: Primary | ICD-10-CM

## 2018-07-09 LAB — CORONARY STENOSIS: ABNORMAL

## 2018-07-09 RX ORDER — BETAMETHASONE VALERATE 1.2 MG/G
CREAM TOPICAL 2 TIMES DAILY
Qty: 15 G | Refills: 5 | Status: SHIPPED | OUTPATIENT
Start: 2018-07-09 | End: 2020-03-13

## 2018-07-10 RX ORDER — AMLODIPINE BESYLATE 5 MG/1
TABLET ORAL
COMMUNITY
End: 2019-05-07

## 2018-07-10 RX ORDER — METOPROLOL SUCCINATE 25 MG/1
25 TABLET, EXTENDED RELEASE ORAL DAILY
Qty: 30 TABLET | Refills: 11 | Status: SHIPPED | OUTPATIENT
Start: 2018-07-10 | End: 2019-07-23 | Stop reason: SDUPTHER

## 2018-07-10 RX ORDER — ISOSORBIDE MONONITRATE 30 MG/1
30 TABLET, EXTENDED RELEASE ORAL DAILY
Qty: 30 TABLET | Refills: 11 | Status: SHIPPED | OUTPATIENT
Start: 2018-07-10 | End: 2019-05-22 | Stop reason: SDUPTHER

## 2018-07-10 RX ORDER — CLOPIDOGREL BISULFATE 75 MG/1
75 TABLET ORAL DAILY
Qty: 90 TABLET | Refills: 3 | Status: SHIPPED | OUTPATIENT
Start: 2018-07-10 | End: 2019-05-22 | Stop reason: SDUPTHER

## 2018-07-10 RX ORDER — ATORVASTATIN CALCIUM 40 MG/1
20 TABLET, FILM COATED ORAL DAILY
Qty: 30 TABLET | Refills: 3 | Status: SHIPPED | OUTPATIENT
Start: 2018-07-10 | End: 2019-05-22 | Stop reason: SDUPTHER

## 2018-07-11 RX ORDER — ETODOLAC 500 MG/1
500 TABLET, FILM COATED ORAL 2 TIMES DAILY
Qty: 60 TABLET | Refills: 0 | Status: SHIPPED | OUTPATIENT
Start: 2018-07-11 | End: 2018-12-13 | Stop reason: SDUPTHER

## 2018-07-24 ENCOUNTER — OFFICE VISIT (OUTPATIENT)
Dept: CARDIOLOGY | Facility: CLINIC | Age: 63
End: 2018-07-24
Payer: COMMERCIAL

## 2018-07-24 VITALS
HEART RATE: 65 BPM | HEIGHT: 72 IN | SYSTOLIC BLOOD PRESSURE: 119 MMHG | DIASTOLIC BLOOD PRESSURE: 64 MMHG | BODY MASS INDEX: 33 KG/M2 | OXYGEN SATURATION: 95 % | WEIGHT: 243.63 LBS

## 2018-07-24 DIAGNOSIS — I10 ESSENTIAL HYPERTENSION: ICD-10-CM

## 2018-07-24 DIAGNOSIS — E78.5 HYPERLIPIDEMIA, UNSPECIFIED HYPERLIPIDEMIA TYPE: ICD-10-CM

## 2018-07-24 DIAGNOSIS — I25.10 CORONARY ARTERY DISEASE INVOLVING NATIVE CORONARY ARTERY OF NATIVE HEART WITHOUT ANGINA PECTORIS: ICD-10-CM

## 2018-07-24 DIAGNOSIS — E66.9 DIABETES MELLITUS TYPE 2 IN OBESE: ICD-10-CM

## 2018-07-24 DIAGNOSIS — E11.69 DIABETES MELLITUS TYPE 2 IN OBESE: ICD-10-CM

## 2018-07-24 DIAGNOSIS — R07.89 CHEST PAIN, ATYPICAL: Primary | ICD-10-CM

## 2018-07-24 PROCEDURE — 3074F SYST BP LT 130 MM HG: CPT | Mod: CPTII,S$GLB,, | Performed by: INTERNAL MEDICINE

## 2018-07-24 PROCEDURE — 99999 PR PBB SHADOW E&M-EST. PATIENT-LVL IV: CPT | Mod: PBBFAC,,, | Performed by: INTERNAL MEDICINE

## 2018-07-24 PROCEDURE — 3008F BODY MASS INDEX DOCD: CPT | Mod: CPTII,S$GLB,, | Performed by: INTERNAL MEDICINE

## 2018-07-24 PROCEDURE — 3078F DIAST BP <80 MM HG: CPT | Mod: CPTII,S$GLB,, | Performed by: INTERNAL MEDICINE

## 2018-07-24 PROCEDURE — 99213 OFFICE O/P EST LOW 20 MIN: CPT | Mod: S$GLB,,, | Performed by: INTERNAL MEDICINE

## 2018-07-24 NOTE — PROGRESS NOTES
"Subjective:    Patient ID:  Edel Edwards III is a 62 y.o. male who presents for follow-up of Post-op Evaluation      HPI   HTN, DM, CAD - TESSA to LAD 12/20/16 7/6/18 [facility] ED 6, LAD stent widely patent, D1  Osteal 90% - "stent detention" - medium vessel, Cx distal 70% - small distal vessel, RCA medium size with diffuse mid 70%  Reviewed with Dr Whaley  Continue with medical Rx     12/20/16 [facility] ED 17, no LV, LAD tandem proximal 90% lesions, Cx distal 70%, RCA medium with diffuse mid 80%        Description of the findings of the procedure: calcified, fibrotic 90%prox LAD --> 0% by predil with 3.5 angioscore and NC balloon. Placement of a 3.5 x 30 mm TESSA.     He continued to have mostly fatigue symptoms following stent     Echo 6/27/18    1 - Low normal to mildly depressed left ventricular systolic function (EF 50-55%).     2 - No wall motion abnormalities.     3 - Concentric remodeling.     4 - Trivial mitral regurgitation.     5 - Trivial tricuspid regurgitation.     6 - The estimated PA systolic pressure is 32 mmHg.      Stress test 6/27/18  Impression: ABNORMAL MYOCARDIAL PERFUSION  1. There is evidence for mild myocardial ischemia in the anterolateral wall of the left ventricle.   2. The perfusion scan is free of evidence for myocardial injury.   3. There is a moderate intensity fixed defect in the inferior wall of the left ventricle, secondary to diaphragm attenuation.   4. Resting wall motion is physiologic.   5. The ventricular volumes are normal at rest and stress.   6. The extracardiac distribution of radioactivity is normal.   7. When compared to the previous study from 02/14/2017, newly noted mild anterolateral ischemia with similar inferior attenuation artifact noted.  8. Ischemic EKG changes noted during ETT.     5/23/17 Reviewed cath film and stress test with Dr Fried - could be a candidate for PCI of RCA although this is a medium sized diffusely diseased vessel. Given improved symptoms will " continue to treat medically for now    Did well after [facility]  Denies CP  Had mild stable OLIVIER         Review of Systems   Constitution: Negative for decreased appetite.   HENT: Negative for ear discharge.    Eyes: Negative for blurred vision.   Endocrine: Negative for polyphagia.   Skin: Negative for nail changes.   Neurological: Negative for aphonia.   Psychiatric/Behavioral: Negative for hallucinations.        Objective:    Physical Exam   Constitutional: He is oriented to person, place, and time. He appears well-developed and well-nourished.   HENT:   Head: Normocephalic and atraumatic.   Eyes: Conjunctivae are normal. Pupils are equal, round, and reactive to light.   Neck: Normal range of motion. Neck supple.   Cardiovascular: Normal rate, normal heart sounds and intact distal pulses.    Pulmonary/Chest: Effort normal and breath sounds normal.   Abdominal: Soft. Bowel sounds are normal.   Musculoskeletal: Normal range of motion.   Neurological: He is alert and oriented to person, place, and time.   Skin: Skin is warm and dry.         Assessment:       1. Chest pain, atypical    2. Essential hypertension    3. Hyperlipidemia, unspecified hyperlipidemia type    4. Coronary artery disease involving native coronary artery of native heart without angina pectoris    5. Diabetes mellitus type 2 in obese         Plan:       Continue Rx  OV 3 months

## 2018-10-29 ENCOUNTER — OFFICE VISIT (OUTPATIENT)
Dept: CARDIOLOGY | Facility: CLINIC | Age: 63
End: 2018-10-29
Payer: COMMERCIAL

## 2018-10-29 VITALS
SYSTOLIC BLOOD PRESSURE: 112 MMHG | BODY MASS INDEX: 32.73 KG/M2 | WEIGHT: 241.63 LBS | OXYGEN SATURATION: 98 % | HEART RATE: 63 BPM | HEIGHT: 72 IN | DIASTOLIC BLOOD PRESSURE: 64 MMHG

## 2018-10-29 DIAGNOSIS — R07.89 CHEST PAIN, ATYPICAL: ICD-10-CM

## 2018-10-29 DIAGNOSIS — I10 ESSENTIAL HYPERTENSION: ICD-10-CM

## 2018-10-29 DIAGNOSIS — I25.10 CORONARY ARTERY DISEASE INVOLVING NATIVE CORONARY ARTERY OF NATIVE HEART WITHOUT ANGINA PECTORIS: Primary | ICD-10-CM

## 2018-10-29 DIAGNOSIS — E66.9 DIABETES MELLITUS TYPE 2 IN OBESE: ICD-10-CM

## 2018-10-29 DIAGNOSIS — E78.5 HYPERLIPIDEMIA, UNSPECIFIED HYPERLIPIDEMIA TYPE: ICD-10-CM

## 2018-10-29 DIAGNOSIS — E11.69 DIABETES MELLITUS TYPE 2 IN OBESE: ICD-10-CM

## 2018-10-29 DIAGNOSIS — R06.02 SOB (SHORTNESS OF BREATH): ICD-10-CM

## 2018-10-29 PROCEDURE — 3074F SYST BP LT 130 MM HG: CPT | Mod: CPTII,S$GLB,, | Performed by: INTERNAL MEDICINE

## 2018-10-29 PROCEDURE — 93000 ELECTROCARDIOGRAM COMPLETE: CPT | Mod: S$GLB,,, | Performed by: INTERNAL MEDICINE

## 2018-10-29 PROCEDURE — 99999 PR PBB SHADOW E&M-EST. PATIENT-LVL IV: CPT | Mod: PBBFAC,,, | Performed by: INTERNAL MEDICINE

## 2018-10-29 PROCEDURE — 3008F BODY MASS INDEX DOCD: CPT | Mod: CPTII,S$GLB,, | Performed by: INTERNAL MEDICINE

## 2018-10-29 PROCEDURE — 3078F DIAST BP <80 MM HG: CPT | Mod: CPTII,S$GLB,, | Performed by: INTERNAL MEDICINE

## 2018-10-29 PROCEDURE — 99213 OFFICE O/P EST LOW 20 MIN: CPT | Mod: S$GLB,,, | Performed by: INTERNAL MEDICINE

## 2018-10-29 NOTE — PROGRESS NOTES
"Subjective:    Patient ID:  Edel Edwards III is a 63 y.o. male who presents for follow-up of Coronary Artery Disease      HPI     HTN, DM, CAD - TESSA to LAD 12/20/16 7/6/18 [facility] ED 6, LAD stent widely patent, D1  Osteal 90% - "stent shelter" - medium vessel, Cx distal 70% - small distal vessel, RCA medium size with diffuse mid 70%  Reviewed with Dr Whaley  Continue with medical Rx     12/20/16 [facility] ED 17, no LV, LAD tandem proximal 90% lesions, Cx distal 70%, RCA medium with diffuse mid 80%        Description of the findings of the procedure: calcified, fibrotic 90%prox LAD --> 0% by predil with 3.5 angioscore and NC balloon. Placement of a 3.5 x 30 mm TESSA.     He continued to have mostly fatigue symptoms following stent     Echo 6/27/18    1 - Low normal to mildly depressed left ventricular systolic function (EF 50-55%).     2 - No wall motion abnormalities.     3 - Concentric remodeling.     4 - Trivial mitral regurgitation.     5 - Trivial tricuspid regurgitation.     6 - The estimated PA systolic pressure is 32 mmHg.      Stress test 6/27/18  Impression: ABNORMAL MYOCARDIAL PERFUSION  1. There is evidence for mild myocardial ischemia in the anterolateral wall of the left ventricle.   2. The perfusion scan is free of evidence for myocardial injury.   3. There is a moderate intensity fixed defect in the inferior wall of the left ventricle, secondary to diaphragm attenuation.   4. Resting wall motion is physiologic.   5. The ventricular volumes are normal at rest and stress.   6. The extracardiac distribution of radioactivity is normal.   7. When compared to the previous study from 02/14/2017, newly noted mild anterolateral ischemia with similar inferior attenuation artifact noted.  8. Ischemic EKG changes noted during ETT.     5/23/17 Reviewed cath film and stress test with Dr Fried - could be a candidate for PCI of RCA although this is a medium sized diffusely diseased vessel. Given improved symptoms " will continue to treat medically for now     7/24/18 Did well after [facility]  Denies CP  Had mild stable OLIVIER    Not exercising much  Denies CP  Stable OLIVIER  EKG NSR - ok           Review of Systems   Constitution: Negative for decreased appetite.   HENT: Negative for ear discharge.    Eyes: Negative for blurred vision.   Endocrine: Negative for polyphagia.   Skin: Negative for nail changes.   Neurological: Negative for aphonia.   Psychiatric/Behavioral: Negative for hallucinations.        Objective:    Physical Exam   Constitutional: He is oriented to person, place, and time. He appears well-developed and well-nourished.   HENT:   Head: Normocephalic and atraumatic.   Eyes: Conjunctivae are normal. Pupils are equal, round, and reactive to light.   Neck: Normal range of motion. Neck supple.   Cardiovascular: Normal rate, normal heart sounds and intact distal pulses.   Pulmonary/Chest: Effort normal and breath sounds normal.   Abdominal: Soft. Bowel sounds are normal.   Musculoskeletal: Normal range of motion.   Neurological: He is alert and oriented to person, place, and time.   Skin: Skin is warm and dry.         Assessment:       1. Coronary artery disease involving native coronary artery of native heart without angina pectoris    2. Hyperlipidemia, unspecified hyperlipidemia type    3. Essential hypertension    4. Diabetes mellitus type 2 in obese    5. Chest pain, atypical         Plan:       Needs more exercise  OV 6 months

## 2018-11-20 DIAGNOSIS — E11.9 DIABETES MELLITUS WITHOUT COMPLICATION: Primary | ICD-10-CM

## 2018-12-13 ENCOUNTER — TELEPHONE (OUTPATIENT)
Dept: FAMILY MEDICINE | Facility: CLINIC | Age: 63
End: 2018-12-13

## 2018-12-13 ENCOUNTER — OFFICE VISIT (OUTPATIENT)
Dept: FAMILY MEDICINE | Facility: CLINIC | Age: 63
End: 2018-12-13
Payer: COMMERCIAL

## 2018-12-13 VITALS
TEMPERATURE: 98 F | WEIGHT: 243.19 LBS | SYSTOLIC BLOOD PRESSURE: 98 MMHG | DIASTOLIC BLOOD PRESSURE: 60 MMHG | HEART RATE: 65 BPM | HEIGHT: 72 IN | RESPIRATION RATE: 16 BRPM | BODY MASS INDEX: 32.94 KG/M2 | OXYGEN SATURATION: 96 %

## 2018-12-13 DIAGNOSIS — Z51.81 ENCOUNTER FOR MONITORING CHRONIC NSAID THERAPY: ICD-10-CM

## 2018-12-13 DIAGNOSIS — Z23 NEED FOR INFLUENZA VACCINATION: ICD-10-CM

## 2018-12-13 DIAGNOSIS — Z79.1 ENCOUNTER FOR MONITORING CHRONIC NSAID THERAPY: ICD-10-CM

## 2018-12-13 DIAGNOSIS — M25.562 CHRONIC PAIN OF BOTH KNEES: Primary | ICD-10-CM

## 2018-12-13 DIAGNOSIS — M25.561 CHRONIC PAIN OF BOTH KNEES: Primary | ICD-10-CM

## 2018-12-13 DIAGNOSIS — G89.29 CHRONIC PAIN OF BOTH KNEES: Primary | ICD-10-CM

## 2018-12-13 DIAGNOSIS — J30.9 ACUTE ALLERGIC RHINITIS: ICD-10-CM

## 2018-12-13 DIAGNOSIS — Z23 NEED FOR PNEUMOCOCCAL VACCINATION: ICD-10-CM

## 2018-12-13 PROCEDURE — 90732 PPSV23 VACC 2 YRS+ SUBQ/IM: CPT | Mod: S$GLB,,, | Performed by: FAMILY MEDICINE

## 2018-12-13 PROCEDURE — 90471 IMMUNIZATION ADMIN: CPT | Mod: S$GLB,,, | Performed by: FAMILY MEDICINE

## 2018-12-13 PROCEDURE — 3078F DIAST BP <80 MM HG: CPT | Mod: CPTII,S$GLB,, | Performed by: FAMILY MEDICINE

## 2018-12-13 PROCEDURE — 3008F BODY MASS INDEX DOCD: CPT | Mod: CPTII,S$GLB,, | Performed by: FAMILY MEDICINE

## 2018-12-13 PROCEDURE — 90472 IMMUNIZATION ADMIN EACH ADD: CPT | Mod: S$GLB,,, | Performed by: FAMILY MEDICINE

## 2018-12-13 PROCEDURE — 99999 PR PBB SHADOW E&M-EST. PATIENT-LVL V: CPT | Mod: PBBFAC,,, | Performed by: FAMILY MEDICINE

## 2018-12-13 PROCEDURE — 99214 OFFICE O/P EST MOD 30 MIN: CPT | Mod: 25,S$GLB,, | Performed by: FAMILY MEDICINE

## 2018-12-13 PROCEDURE — 90686 IIV4 VACC NO PRSV 0.5 ML IM: CPT | Mod: S$GLB,,, | Performed by: FAMILY MEDICINE

## 2018-12-13 PROCEDURE — 3074F SYST BP LT 130 MM HG: CPT | Mod: CPTII,S$GLB,, | Performed by: FAMILY MEDICINE

## 2018-12-13 RX ORDER — CETIRIZINE HYDROCHLORIDE 10 MG/1
10 TABLET ORAL DAILY
Qty: 90 TABLET | Refills: 3 | Status: SHIPPED | OUTPATIENT
Start: 2018-12-13 | End: 2020-03-13

## 2018-12-13 RX ORDER — ETODOLAC 500 MG/1
500 TABLET, FILM COATED ORAL 2 TIMES DAILY
Qty: 180 TABLET | Refills: 1 | Status: SHIPPED | OUTPATIENT
Start: 2018-12-13 | End: 2018-12-13 | Stop reason: SDUPTHER

## 2018-12-13 RX ORDER — CETIRIZINE HYDROCHLORIDE 10 MG/1
10 TABLET ORAL DAILY
Qty: 90 TABLET | Refills: 3 | Status: SHIPPED | OUTPATIENT
Start: 2018-12-13 | End: 2018-12-13 | Stop reason: SDUPTHER

## 2018-12-13 RX ORDER — ETODOLAC 500 MG/1
500 TABLET, FILM COATED ORAL 2 TIMES DAILY
Qty: 180 TABLET | Refills: 1 | Status: SHIPPED | OUTPATIENT
Start: 2018-12-13 | End: 2020-03-13 | Stop reason: SDUPTHER

## 2018-12-13 RX ORDER — CLOTRIMAZOLE AND BETAMETHASONE DIPROPIONATE 10; .64 MG/G; MG/G
CREAM TOPICAL
COMMUNITY

## 2018-12-13 NOTE — PROGRESS NOTES
Health Maintenance     Pneumococcal Vaccine (Medium Risk) (1 of 1 - PPSV23) Ok today    Zoster Vaccine Sign ADDISON     Hemoglobin A1c Postpone    Lipid Panel Postpone    Influenza Vaccine Ok today

## 2018-12-13 NOTE — PROGRESS NOTES
Pt tolerated flu and pneumonia vaccine to left deltoid without difficulty; no adverse reaction noted; VIS given

## 2018-12-13 NOTE — PROGRESS NOTES
Subjective:       Patient ID: Edel Edwards III is a 63 y.o. male.    Chief Complaint: Pain (follow up for rx refill for etodolac (LODINE) 500 MG tablet)    HPI    AR - pt is doing well with zyrtec for his sinus congestion and rhinorrhea. He is requesting a f/u.     Chronic knee and back pain - pt uses etodolac every day for aches and pains and it works very well.     Chronic nsaid therapy - pt is on etodolac BID. He has no stomach or h/o kidney issues. No stomach pain or complaints. No n/v.              Current Outpatient Medications on File Prior to Visit   Medication Sig Dispense Refill    ACCU-CHEK SMARTVIEW TEST STRIP Strp as needed.   1    aspirin (ECOTRIN) 81 MG EC tablet Take 1 tablet (81 mg total) by mouth once daily.  0    atorvastatin (LIPITOR) 40 MG tablet Take 0.5 tablets (20 mg total) by mouth once daily. 30 tablet 3    azilsartan medoxomil (EDARBI) 80 mg Tab Take by mouth once daily.       clopidogrel (PLAVIX) 75 mg tablet Take 1 tablet (75 mg total) by mouth once daily. 90 tablet 3    clotrimazole-betamethasone 1-0.05% (LOTRISONE) cream Apply topically as needed.      dapagliflozin (FARXIGA) 10 mg Tab Take by mouth once daily.       gabapentin (NEURONTIN) 800 MG tablet Take by mouth. Take half tablet nightly      isosorbide mononitrate (IMDUR) 30 MG 24 hr tablet Take 1 tablet (30 mg total) by mouth once daily. 30 tablet 11    metformin (GLUCOPHAGE) 1000 MG tablet Take 1,000 mg by mouth 2 (two) times daily with meals.        metoprolol succinate (TOPROL-XL) 25 MG 24 hr tablet Take 1 tablet (25 mg total) by mouth once daily. 30 tablet 11    ranitidine HCl (ZANTAC ORAL) Take 150 mg by mouth as needed.      tamsulosin (FLOMAX) 0.4 mg Cp24 Take 1 capsule (0.4 mg total) by mouth once daily. 90 capsule 3    terazosin (HYTRIN) 2 MG capsule Take 2 mg by mouth every evening.       TRULICITY 0.75 mg/0.5 mL PnIj 0.75mg weekly  0    [DISCONTINUED] cetirizine (ZYRTEC) 10 MG tablet Take 1  tablet (10 mg total) by mouth once daily. 90 tablet 3    [DISCONTINUED] etodolac (LODINE) 500 MG tablet Take 1 tablet (500 mg total) by mouth 2 (two) times daily. 60 tablet 0    amLODIPine (NORVASC) 5 MG tablet amlodipine 5 mg tablet      betamethasone valerate 0.1% (VALISONE) 0.1 % Crea Apply topically 2 (two) times daily. for 10 days 15 g 5     No current facility-administered medications on file prior to visit.        Past Medical History:   Diagnosis Date    Anticoagulant long-term use     Plavix and Aspirin    BMI 33.0-33.9,adult     Cataract     Coronary artery disease     Diabetes mellitus type 2 in obese     HTN (hypertension)     Hyperlipidemia     Sciatica        Family History   Problem Relation Age of Onset    Hypertension Mother     Stroke Father     No Known Problems Sister     No Known Problems Sister     Diabetes Maternal Aunt     Diabetes Paternal Grandmother     No Known Problems Brother     No Known Problems Maternal Uncle     No Known Problems Paternal Aunt     No Known Problems Paternal Uncle     No Known Problems Maternal Grandmother     No Known Problems Maternal Grandfather     No Known Problems Paternal Grandfather     Amblyopia Neg Hx     Blindness Neg Hx     Cancer Neg Hx     Cataracts Neg Hx     Glaucoma Neg Hx     Macular degeneration Neg Hx     Retinal detachment Neg Hx     Strabismus Neg Hx     Thyroid disease Neg Hx         reports that he quit smoking about 9 years ago. he has never used smokeless tobacco. He reports that he does not drink alcohol or use drugs.    Review of Systems   Constitutional: Negative for activity change and unexpected weight change.   HENT: Negative for hearing loss, rhinorrhea and trouble swallowing.    Eyes: Negative for discharge and visual disturbance.   Respiratory: Negative for chest tightness and wheezing.    Cardiovascular: Negative for chest pain and palpitations.   Gastrointestinal: Negative for blood in stool,  constipation, diarrhea and vomiting.   Endocrine: Negative for polydipsia and polyuria.   Genitourinary: Negative for difficulty urinating, hematuria and urgency.   Musculoskeletal: Negative for arthralgias, joint swelling and neck pain.   Neurological: Negative for weakness and headaches.   Psychiatric/Behavioral: Negative for confusion and dysphoric mood.       Objective:     Vitals:    12/13/18 0811   BP: 98/60   Pulse: 65   Resp: 16   Temp: 98.4 °F (36.9 °C)        Physical Exam   Constitutional: He appears well-developed. No distress.   O   HENT:   Head: Normocephalic and atraumatic.   Eyes: Conjunctivae are normal. No scleral icterus.   Pulmonary/Chest: Effort normal.   Neurological: He is alert.   Psychiatric: He has a normal mood and affect. His behavior is normal.   Nursing note and vitals reviewed.      Assessment:       1. Chronic pain of both knees    2. Acute allergic rhinitis    3. Encounter for monitoring chronic NSAID therapy    4. Need for influenza vaccination    5. Need for pneumococcal vaccination        Plan:       Edel was seen today for pain.    Diagnoses and all orders for this visit:    Chronic pain of both knees  -     Discontinue: etodolac (LODINE) 500 MG tablet; Take 1 tablet (500 mg total) by mouth 2 (two) times daily.  -     etodolac (LODINE) 500 MG tablet; Take 1 tablet (500 mg total) by mouth 2 (two) times daily.  Patient and I discussed risks and benefits of long-term NSAID therapy including risks to the lining of the stomach resulting in stomach ulcers or gastritis and potential damage to kidneys.  Patient denies any stomach symptoms at this time and his recent blood work the check his kidney function looked great.  Patient advised to use as seldomly as possible and to try even half dose in the evening if this is able to improve his pain to a comfortable level.   Acute allergic rhinitis  -     Discontinue: cetirizine (ZYRTEC) 10 MG tablet; Take 1 tablet (10 mg total) by mouth once  daily.  -     cetirizine (ZYRTEC) 10 MG tablet; Take 1 tablet (10 mg total) by mouth once daily.  - Chronic - stable     Pt is doing well on current therapy. No side effects noted. Will continue current therapy.    Encounter for monitoring chronic NSAID therapy  Chronic - see above - New to me    Need for influenza vaccination  -     Influenza - Quadrivalent (3 years & older) (PF)    Need for pneumococcal vaccination  -     Pneumococcal Polysaccharide Vaccine (23 Valent) (SQ/IM)            Follow-up in about 6 months (around 6/13/2019), or if symptoms worsen or fail to improve, for routine f/u.      Pt verbalized understanding and agreed with our plan.

## 2019-04-05 ENCOUNTER — PATIENT MESSAGE (OUTPATIENT)
Dept: UROLOGY | Facility: CLINIC | Age: 64
End: 2019-04-05

## 2019-04-05 DIAGNOSIS — N13.8 BPH WITH OBSTRUCTION/LOWER URINARY TRACT SYMPTOMS: Primary | ICD-10-CM

## 2019-04-05 DIAGNOSIS — N40.1 BPH WITH OBSTRUCTION/LOWER URINARY TRACT SYMPTOMS: Primary | ICD-10-CM

## 2019-04-09 ENCOUNTER — OFFICE VISIT (OUTPATIENT)
Dept: CARDIOLOGY | Facility: CLINIC | Age: 64
End: 2019-04-09
Payer: COMMERCIAL

## 2019-04-09 VITALS
OXYGEN SATURATION: 98 % | BODY MASS INDEX: 32.57 KG/M2 | DIASTOLIC BLOOD PRESSURE: 74 MMHG | HEART RATE: 67 BPM | HEIGHT: 72 IN | WEIGHT: 240.5 LBS | SYSTOLIC BLOOD PRESSURE: 128 MMHG

## 2019-04-09 DIAGNOSIS — R07.89 CHEST PAIN, ATYPICAL: ICD-10-CM

## 2019-04-09 DIAGNOSIS — R06.02 SOB (SHORTNESS OF BREATH): ICD-10-CM

## 2019-04-09 DIAGNOSIS — I10 ESSENTIAL HYPERTENSION: Primary | ICD-10-CM

## 2019-04-09 DIAGNOSIS — E66.9 DIABETES MELLITUS TYPE 2 IN OBESE: ICD-10-CM

## 2019-04-09 DIAGNOSIS — E11.69 DIABETES MELLITUS TYPE 2 IN OBESE: ICD-10-CM

## 2019-04-09 DIAGNOSIS — I25.10 CORONARY ARTERY DISEASE INVOLVING NATIVE CORONARY ARTERY OF NATIVE HEART WITHOUT ANGINA PECTORIS: ICD-10-CM

## 2019-04-09 DIAGNOSIS — E78.5 HYPERLIPIDEMIA, UNSPECIFIED HYPERLIPIDEMIA TYPE: ICD-10-CM

## 2019-04-09 PROCEDURE — 99999 PR PBB SHADOW E&M-EST. PATIENT-LVL IV: ICD-10-PCS | Mod: PBBFAC,,, | Performed by: INTERNAL MEDICINE

## 2019-04-09 PROCEDURE — 3078F DIAST BP <80 MM HG: CPT | Mod: CPTII,S$GLB,, | Performed by: INTERNAL MEDICINE

## 2019-04-09 PROCEDURE — 99999 PR PBB SHADOW E&M-EST. PATIENT-LVL IV: CPT | Mod: PBBFAC,,, | Performed by: INTERNAL MEDICINE

## 2019-04-09 PROCEDURE — 3008F PR BODY MASS INDEX (BMI) DOCUMENTED: ICD-10-PCS | Mod: CPTII,S$GLB,, | Performed by: INTERNAL MEDICINE

## 2019-04-09 PROCEDURE — 93000 EKG 12-LEAD: ICD-10-PCS | Mod: S$GLB,,, | Performed by: INTERNAL MEDICINE

## 2019-04-09 PROCEDURE — 3074F PR MOST RECENT SYSTOLIC BLOOD PRESSURE < 130 MM HG: ICD-10-PCS | Mod: CPTII,S$GLB,, | Performed by: INTERNAL MEDICINE

## 2019-04-09 PROCEDURE — 3008F BODY MASS INDEX DOCD: CPT | Mod: CPTII,S$GLB,, | Performed by: INTERNAL MEDICINE

## 2019-04-09 PROCEDURE — 3074F SYST BP LT 130 MM HG: CPT | Mod: CPTII,S$GLB,, | Performed by: INTERNAL MEDICINE

## 2019-04-09 PROCEDURE — 99213 OFFICE O/P EST LOW 20 MIN: CPT | Mod: S$GLB,,, | Performed by: INTERNAL MEDICINE

## 2019-04-09 PROCEDURE — 93000 ELECTROCARDIOGRAM COMPLETE: CPT | Mod: S$GLB,,, | Performed by: INTERNAL MEDICINE

## 2019-04-09 PROCEDURE — 99213 PR OFFICE/OUTPT VISIT, EST, LEVL III, 20-29 MIN: ICD-10-PCS | Mod: S$GLB,,, | Performed by: INTERNAL MEDICINE

## 2019-04-09 PROCEDURE — 3078F PR MOST RECENT DIASTOLIC BLOOD PRESSURE < 80 MM HG: ICD-10-PCS | Mod: CPTII,S$GLB,, | Performed by: INTERNAL MEDICINE

## 2019-04-30 ENCOUNTER — OFFICE VISIT (OUTPATIENT)
Dept: OPTOMETRY | Facility: CLINIC | Age: 64
End: 2019-04-30
Payer: COMMERCIAL

## 2019-04-30 DIAGNOSIS — Z98.890 HISTORY OF LASER PHOTOCOAGULATION OF RETINA: ICD-10-CM

## 2019-04-30 DIAGNOSIS — Z46.0 FITTING AND ADJUSTMENT OF SPECTACLES AND CONTACT LENSES: Primary | ICD-10-CM

## 2019-04-30 DIAGNOSIS — H52.4 MYOPIA WITH ASTIGMATISM AND PRESBYOPIA, BILATERAL: ICD-10-CM

## 2019-04-30 DIAGNOSIS — H52.13 MYOPIA WITH ASTIGMATISM AND PRESBYOPIA, BILATERAL: ICD-10-CM

## 2019-04-30 DIAGNOSIS — H25.13 NUCLEAR SCLEROSIS, BILATERAL: ICD-10-CM

## 2019-04-30 DIAGNOSIS — E11.9 TYPE 2 DIABETES MELLITUS WITHOUT RETINOPATHY: Primary | ICD-10-CM

## 2019-04-30 DIAGNOSIS — H52.203 MYOPIA WITH ASTIGMATISM AND PRESBYOPIA, BILATERAL: ICD-10-CM

## 2019-04-30 PROCEDURE — 92015 DETERMINE REFRACTIVE STATE: CPT | Mod: S$GLB,,, | Performed by: OPTOMETRIST

## 2019-04-30 PROCEDURE — 92310 PR CONTACT LENS FITTING (NO CHANGE): ICD-10-PCS | Mod: ,,, | Performed by: OPTOMETRIST

## 2019-04-30 PROCEDURE — 99999 PR PBB SHADOW E&M-EST. PATIENT-LVL II: ICD-10-PCS | Mod: PBBFAC,,, | Performed by: OPTOMETRIST

## 2019-04-30 PROCEDURE — 92014 COMPRE OPH EXAM EST PT 1/>: CPT | Mod: S$GLB,,, | Performed by: OPTOMETRIST

## 2019-04-30 PROCEDURE — 99999 PR PBB SHADOW E&M-EST. PATIENT-LVL II: CPT | Mod: PBBFAC,,, | Performed by: OPTOMETRIST

## 2019-04-30 PROCEDURE — 92014 PR EYE EXAM, EST PATIENT,COMPREHESV: ICD-10-PCS | Mod: S$GLB,,, | Performed by: OPTOMETRIST

## 2019-04-30 PROCEDURE — 92015 PR REFRACTION: ICD-10-PCS | Mod: S$GLB,,, | Performed by: OPTOMETRIST

## 2019-04-30 PROCEDURE — 92310 CONTACT LENS FITTING OU: CPT | Mod: ,,, | Performed by: OPTOMETRIST

## 2019-05-01 ENCOUNTER — LAB VISIT (OUTPATIENT)
Dept: LAB | Facility: HOSPITAL | Age: 64
End: 2019-05-01
Attending: UROLOGY
Payer: COMMERCIAL

## 2019-05-01 DIAGNOSIS — N13.8 BPH WITH OBSTRUCTION/LOWER URINARY TRACT SYMPTOMS: ICD-10-CM

## 2019-05-01 DIAGNOSIS — N40.1 BPH WITH OBSTRUCTION/LOWER URINARY TRACT SYMPTOMS: ICD-10-CM

## 2019-05-01 LAB — COMPLEXED PSA SERPL-MCNC: 6.3 NG/ML (ref 0–4)

## 2019-05-01 PROCEDURE — 36415 COLL VENOUS BLD VENIPUNCTURE: CPT

## 2019-05-01 PROCEDURE — 84153 ASSAY OF PSA TOTAL: CPT

## 2019-05-07 ENCOUNTER — OFFICE VISIT (OUTPATIENT)
Dept: UROLOGY | Facility: CLINIC | Age: 64
End: 2019-05-07
Payer: COMMERCIAL

## 2019-05-07 VITALS
BODY MASS INDEX: 32.67 KG/M2 | HEIGHT: 72 IN | WEIGHT: 241.19 LBS | DIASTOLIC BLOOD PRESSURE: 78 MMHG | SYSTOLIC BLOOD PRESSURE: 122 MMHG

## 2019-05-07 DIAGNOSIS — N52.9 ED (ERECTILE DYSFUNCTION) OF ORGANIC ORIGIN: ICD-10-CM

## 2019-05-07 DIAGNOSIS — R35.1 NOCTURIA MORE THAN TWICE PER NIGHT: ICD-10-CM

## 2019-05-07 DIAGNOSIS — R97.20 ELEVATED PSA: Primary | ICD-10-CM

## 2019-05-07 DIAGNOSIS — N40.0 BPH WITHOUT OBSTRUCTION/LOWER URINARY TRACT SYMPTOMS: ICD-10-CM

## 2019-05-07 LAB
BILIRUB SERPL-MCNC: NORMAL MG/DL
BLOOD URINE, POC: NORMAL
COLOR, POC UA: YELLOW
GLUCOSE UR QL STRIP: POSITIVE
KETONES UR QL STRIP: NORMAL
LEUKOCYTE ESTERASE URINE, POC: NORMAL
NITRITE, POC UA: NORMAL
PH, POC UA: 5
PROTEIN, POC: NORMAL
SPECIFIC GRAVITY, POC UA: 1000
UROBILINOGEN, POC UA: NORMAL

## 2019-05-07 PROCEDURE — 81001 PR  URINALYSIS, AUTO, W/SCOPE: ICD-10-PCS | Mod: S$GLB,,, | Performed by: UROLOGY

## 2019-05-07 PROCEDURE — 3008F BODY MASS INDEX DOCD: CPT | Mod: CPTII,S$GLB,, | Performed by: UROLOGY

## 2019-05-07 PROCEDURE — 3074F SYST BP LT 130 MM HG: CPT | Mod: CPTII,S$GLB,, | Performed by: UROLOGY

## 2019-05-07 PROCEDURE — 3078F PR MOST RECENT DIASTOLIC BLOOD PRESSURE < 80 MM HG: ICD-10-PCS | Mod: CPTII,S$GLB,, | Performed by: UROLOGY

## 2019-05-07 PROCEDURE — 99214 OFFICE O/P EST MOD 30 MIN: CPT | Mod: 25,S$GLB,, | Performed by: UROLOGY

## 2019-05-07 PROCEDURE — 3074F PR MOST RECENT SYSTOLIC BLOOD PRESSURE < 130 MM HG: ICD-10-PCS | Mod: CPTII,S$GLB,, | Performed by: UROLOGY

## 2019-05-07 PROCEDURE — 3078F DIAST BP <80 MM HG: CPT | Mod: CPTII,S$GLB,, | Performed by: UROLOGY

## 2019-05-07 PROCEDURE — 99214 PR OFFICE/OUTPT VISIT, EST, LEVL IV, 30-39 MIN: ICD-10-PCS | Mod: 25,S$GLB,, | Performed by: UROLOGY

## 2019-05-07 PROCEDURE — 81001 URINALYSIS AUTO W/SCOPE: CPT | Mod: S$GLB,,, | Performed by: UROLOGY

## 2019-05-07 PROCEDURE — 99999 PR PBB SHADOW E&M-EST. PATIENT-LVL III: CPT | Mod: PBBFAC,,, | Performed by: UROLOGY

## 2019-05-07 PROCEDURE — 3008F PR BODY MASS INDEX (BMI) DOCUMENTED: ICD-10-PCS | Mod: CPTII,S$GLB,, | Performed by: UROLOGY

## 2019-05-07 PROCEDURE — 99999 PR PBB SHADOW E&M-EST. PATIENT-LVL III: ICD-10-PCS | Mod: PBBFAC,,, | Performed by: UROLOGY

## 2019-05-07 RX ORDER — ASPIRIN 81 MG/1
81 TABLET ORAL DAILY
COMMUNITY

## 2019-05-07 RX ORDER — GABAPENTIN 800 MG/1
400 TABLET ORAL DAILY
Qty: 90 TABLET | Refills: 3 | Status: SHIPPED | OUTPATIENT
Start: 2019-05-07

## 2019-05-07 RX ORDER — TAMSULOSIN HYDROCHLORIDE 0.4 MG/1
1 CAPSULE ORAL DAILY
Qty: 90 CAPSULE | Refills: 3 | Status: SHIPPED | OUTPATIENT
Start: 2019-05-07 | End: 2020-05-18

## 2019-05-07 RX ORDER — GABAPENTIN 800 MG/1
800 TABLET ORAL DAILY
Qty: 90 TABLET | Refills: 3 | Status: SHIPPED | OUTPATIENT
Start: 2019-05-07 | End: 2019-05-07

## 2019-05-07 NOTE — PROGRESS NOTES
Subjective:       Patient ID: Edel Edwards III is a 63 y.o. male who was last seen in this office Visit date not found    Chief Complaint:   Chief Complaint   Patient presents with    Benign Prostatic Hypertrophy     annual visit  with psa     Elevated PSA       Elevated PSA  Patient is here with an elevated PSA. He has no personal history and no family history of prostate cancer.  He has a prior genitourinary history of erectile dysfunction, and previous biopsies a few years ago with Dr. Umanzor.  Previous PSA values are :     Ref Range & Units 6d ago   PSA DIAGNOSTIC 0.00 - 4.00 ng/mL 6.3High     Comment: PSA Expected levels:   Hormonal Therapy: <0.05 ng/ml   Prostatectomy: <0.01 ng/ml   Radiation Therapy: <1.00 ng/ml    Resulting Agency  [facility]         Specimen Collected: 05/01/19 09:38   Last Resulted: 05/01/19 17:16            PSA Total 0.00 - 4.00 ng/mL 5.5     Comments: PSA Expected levels:   Hormonal Therapy: <0.05 ng/ml   Prostatectomy: <0.01 ng/ml   Radiation Therapy: <1.00 ng/ml    PSA, Free 0.01 - 1.50 ng/mL 1.56     PSA, Free Pct Not established % 28.36    Resulting Agency  [facility]      Specimen Collected: 07/19/17 09:20   Last Resulted: 07/19/17 20:12          Lab Results   Component Value Date    PSA 6.3 (H) 11/30/2016    PSA 8.2 (H) 06/17/2015     Benign Prostatic Hyperplasia  He patient reports nocturia two times a night. He denies straining, urgency and weak stream. The patient states symptoms are of moderate severity. Onset of symptoms was several years ago and was gradual in onset.  He has no personal history and no family history of prostate cancer. He reports a history of no complicating symptoms. He denies flank pain, gross hematuria, kidney stones and recurrent UTI.  He is currently taking Flomax.  He has a right directed stream.    Erectile Dysfunction  Patient complains of erectile dysfunction. Onset of dysfunction was several years ago and was gradual in onset.  Patient states the  nature of difficulty is maintaining erection. Full erections occur with intercourse. Partial erections occur with intercourse. Libido is not affected. Risk factors for ED include cardiovascular disease and diabetes mellitus. Patient denies history of pelvic radiation. Previous treatment of ED includes Levitra and Cialis.  He is no longer taking these after his recent cardiac stent placement on Imdur Rx.    Penile Rash  He has intermittently had issues with a red rash on his penis for a few year.  It will sometime itch.  He usually applies Desonide or Lotrisone cream but it always returns..    ACTIVE MEDICAL ISSUES:  Patient Active Problem List   Diagnosis    Diabetes mellitus type 2 in obese    HTN (hypertension)    Hyperlipidemia    Type 2 diabetes mellitus without retinopathy - Both Eyes    Nuclear sclerosis - Both Eyes    BMI 33.0-33.9,adult    Elevated PSA    Chest pain, atypical    CAD (coronary artery disease)       ALLERGIES AND MEDICATIONS: updated and reviewed.  Review of patient's allergies indicates:  No Known Allergies  Current Outpatient Medications   Medication Sig    ACCU-CHEK SMARTVIEW TEST STRIP Strp as needed.     aspirin (ECOTRIN) 81 MG EC tablet Take 81 mg by mouth once daily.    atorvastatin (LIPITOR) 40 MG tablet Take 0.5 tablets (20 mg total) by mouth once daily.    azilsartan medoxomil (EDARBI) 80 mg Tab Take by mouth once daily.     cetirizine (ZYRTEC) 10 MG tablet Take 1 tablet (10 mg total) by mouth once daily.    clopidogrel (PLAVIX) 75 mg tablet Take 1 tablet (75 mg total) by mouth once daily.    clotrimazole-betamethasone 1-0.05% (LOTRISONE) cream Apply topically as needed.    dapagliflozin (FARXIGA) 10 mg Tab Take by mouth once daily.     etodolac (LODINE) 500 MG tablet Take 1 tablet (500 mg total) by mouth 2 (two) times daily.    gabapentin (NEURONTIN) 800 MG tablet Take 1 tablet (800 mg total) by mouth once daily. Take half tablet nightly    isosorbide mononitrate  (IMDUR) 30 MG 24 hr tablet Take 1 tablet (30 mg total) by mouth once daily.    metformin (GLUCOPHAGE) 1000 MG tablet Take 1,000 mg by mouth 2 (two) times daily with meals.      metoprolol succinate (TOPROL-XL) 25 MG 24 hr tablet Take 1 tablet (25 mg total) by mouth once daily.    tamsulosin (FLOMAX) 0.4 mg Cap Take 1 capsule (0.4 mg total) by mouth once daily.    terazosin (HYTRIN) 2 MG capsule Take 2 mg by mouth every evening.     TRULICITY 0.75 mg/0.5 mL PnIj 0.75mg weekly    betamethasone valerate 0.1% (VALISONE) 0.1 % Crea Apply topically 2 (two) times daily. for 10 days     No current facility-administered medications for this visit.        Review of Systems   Constitutional: Negative for activity change, fatigue, fever and unexpected weight change.   HENT: Negative for congestion.    Eyes: Negative for redness.   Respiratory: Negative for chest tightness and shortness of breath.    Cardiovascular: Negative for chest pain and leg swelling.   Gastrointestinal: Negative for abdominal pain, constipation, diarrhea, nausea and vomiting.   Genitourinary: Negative for dysuria, flank pain, frequency, hematuria, penile pain, penile swelling, scrotal swelling, testicular pain and urgency.   Musculoskeletal: Negative for arthralgias and back pain.   Neurological: Negative for dizziness and light-headedness.   Psychiatric/Behavioral: Negative for behavioral problems and confusion. The patient is not nervous/anxious.    All other systems reviewed and are negative.      Objective:      Vitals:    05/07/19 0921   BP: 122/78   BP Location: Right arm   Patient Position: Sitting   BP Method: Large (Manual)   Weight: 109.4 kg (241 lb 2.9 oz)   Height: 6' (1.829 m)     Physical Exam   Nursing note and vitals reviewed.  Constitutional: He is oriented to person, place, and time. He appears well-developed and well-nourished.   HENT:   Head: Normocephalic.   Eyes: Conjunctivae are normal.   Neck: Normal range of motion. Neck  supple. No tracheal deviation present. No thyromegaly present.   Cardiovascular: Normal rate and normal heart sounds.    Pulmonary/Chest: Effort normal and breath sounds normal. No respiratory distress. He has no wheezes.   Abdominal: Soft. Bowel sounds are normal. He exhibits no distension and no mass. There is no hepatosplenomegaly. There is no tenderness. There is no rebound, no guarding and no CVA tenderness. No hernia. Hernia confirmed negative in the right inguinal area and confirmed negative in the left inguinal area.   Genitourinary: Rectum normal, testes normal and penis normal. Rectal exam shows no external hemorrhoid, no mass and no tenderness. Prostate is enlarged. Prostate is not tender. Right testis shows no mass and no tenderness. Left testis shows no mass and no tenderness.       Musculoskeletal: Normal range of motion. He exhibits no edema or tenderness.   Lymphadenopathy:     He has no cervical adenopathy. No inguinal adenopathy noted on the right or left side.   Neurological: He is alert and oriented to person, place, and time.   Skin: Skin is warm and dry. No rash noted. No erythema.     Psychiatric: He has a normal mood and affect. His behavior is normal. Judgment and thought content normal.       Urine dipstick shows negative for all components.  Micro exam: negative for WBC's or RBC's.    Assessment:       1. Elevated PSA    2. BPH without obstruction/lower urinary tract symptoms    3. Nocturia more than twice per night    4. ED (erectile dysfunction) of organic origin          Plan:       1. Elevated PSA  We discussed biopsy vs repeat PSA.  We agreed to repeat PSA.    - POCT urinalysis, dipstick or tablet reag  - PSA, total and free; Future    2. BPH without obstruction/lower urinary tract symptoms    - tamsulosin (FLOMAX) 0.4 mg Cap; Take 1 capsule (0.4 mg total) by mouth once daily.  Dispense: 90 capsule; Refill: 3    3. Nocturia more than twice per night  Glucose control    4. ED (erectile  dysfunction) of organic origin  Discussed injections or IPP.  He is declining            Follow up in about 1 year (around 5/7/2020) for Follow up, Review PSA.

## 2019-05-22 DIAGNOSIS — E78.5 HYPERLIPIDEMIA, UNSPECIFIED HYPERLIPIDEMIA TYPE: ICD-10-CM

## 2019-05-26 RX ORDER — CLOPIDOGREL BISULFATE 75 MG/1
TABLET ORAL
Qty: 90 TABLET | Refills: 3 | Status: SHIPPED | OUTPATIENT
Start: 2019-05-26 | End: 2020-06-14

## 2019-05-26 RX ORDER — ISOSORBIDE MONONITRATE 30 MG/1
TABLET, EXTENDED RELEASE ORAL
Qty: 90 TABLET | Refills: 3 | Status: SHIPPED | OUTPATIENT
Start: 2019-05-26 | End: 2020-06-14

## 2019-05-26 RX ORDER — ATORVASTATIN CALCIUM 40 MG/1
TABLET, FILM COATED ORAL
Qty: 45 TABLET | Refills: 3 | Status: SHIPPED | OUTPATIENT
Start: 2019-05-26 | End: 2020-06-08

## 2019-07-23 RX ORDER — METOPROLOL SUCCINATE 25 MG/1
TABLET, EXTENDED RELEASE ORAL
Qty: 30 TABLET | Refills: 11 | Status: SHIPPED | OUTPATIENT
Start: 2019-07-23 | End: 2020-08-11

## 2020-01-30 LAB
CHOLESTEROL, TOTAL: 149
CHOLESTEROL/HDL RATIO SCREEN: 3.3
HBA1C MFR BLD: 6.1 %
HBA1C MFR BLD: 6.1 % (ref 4–6)
HDLC SERPL-MCNC: 45 MG/DL
LDLC SERPL CALC-MCNC: 82 MG/DL
NON HDL CHOL (CALC): 104
TRIGLYCERIDE (LIPID PAN): 119

## 2020-03-02 ENCOUNTER — PATIENT OUTREACH (OUTPATIENT)
Dept: ADMINISTRATIVE | Facility: HOSPITAL | Age: 65
End: 2020-03-02

## 2020-03-03 ENCOUNTER — PATIENT OUTREACH (OUTPATIENT)
Dept: ADMINISTRATIVE | Facility: HOSPITAL | Age: 65
End: 2020-03-03

## 2020-03-13 ENCOUNTER — OFFICE VISIT (OUTPATIENT)
Dept: FAMILY MEDICINE | Facility: CLINIC | Age: 65
End: 2020-03-13
Payer: MEDICARE

## 2020-03-13 VITALS
HEIGHT: 72 IN | DIASTOLIC BLOOD PRESSURE: 68 MMHG | OXYGEN SATURATION: 97 % | TEMPERATURE: 98 F | HEART RATE: 69 BPM | SYSTOLIC BLOOD PRESSURE: 102 MMHG | BODY MASS INDEX: 31.95 KG/M2 | WEIGHT: 235.88 LBS | RESPIRATION RATE: 20 BRPM

## 2020-03-13 DIAGNOSIS — E11.69 DIABETES MELLITUS TYPE 2 IN OBESE: ICD-10-CM

## 2020-03-13 DIAGNOSIS — Z23 NEED FOR VACCINATION WITH 13-POLYVALENT PNEUMOCOCCAL CONJUGATE VACCINE: ICD-10-CM

## 2020-03-13 DIAGNOSIS — I25.10 CORONARY ARTERY DISEASE INVOLVING NATIVE CORONARY ARTERY OF NATIVE HEART WITHOUT ANGINA PECTORIS: ICD-10-CM

## 2020-03-13 DIAGNOSIS — Z23 INFLUENZA VACCINE NEEDED: ICD-10-CM

## 2020-03-13 DIAGNOSIS — M25.561 CHRONIC PAIN OF BOTH KNEES: Primary | ICD-10-CM

## 2020-03-13 DIAGNOSIS — Z13.6 ENCOUNTER FOR ABDOMINAL AORTIC ANEURYSM (AAA) SCREENING: ICD-10-CM

## 2020-03-13 DIAGNOSIS — M25.562 CHRONIC PAIN OF BOTH KNEES: Primary | ICD-10-CM

## 2020-03-13 DIAGNOSIS — I10 ESSENTIAL HYPERTENSION: ICD-10-CM

## 2020-03-13 DIAGNOSIS — E78.5 HYPERLIPIDEMIA, UNSPECIFIED HYPERLIPIDEMIA TYPE: ICD-10-CM

## 2020-03-13 DIAGNOSIS — G89.29 CHRONIC PAIN OF BOTH KNEES: Primary | ICD-10-CM

## 2020-03-13 DIAGNOSIS — E66.9 DIABETES MELLITUS TYPE 2 IN OBESE: ICD-10-CM

## 2020-03-13 PROCEDURE — 90670 PNEUMOCOCCAL CONJUGATE VACCINE 13-VALENT LESS THAN 5YO & GREATER THAN: ICD-10-PCS | Mod: S$GLB,,, | Performed by: FAMILY MEDICINE

## 2020-03-13 PROCEDURE — 3074F SYST BP LT 130 MM HG: CPT | Mod: CPTII,S$GLB,, | Performed by: FAMILY MEDICINE

## 2020-03-13 PROCEDURE — 1101F PR PT FALLS ASSESS DOC 0-1 FALLS W/OUT INJ PAST YR: ICD-10-PCS | Mod: CPTII,S$GLB,, | Performed by: FAMILY MEDICINE

## 2020-03-13 PROCEDURE — 99999 PR PBB SHADOW E&M-EST. PATIENT-LVL V: CPT | Mod: PBBFAC,,, | Performed by: FAMILY MEDICINE

## 2020-03-13 PROCEDURE — 99214 OFFICE O/P EST MOD 30 MIN: CPT | Mod: 25,S$GLB,, | Performed by: FAMILY MEDICINE

## 2020-03-13 PROCEDURE — G0008 FLU VACCINE - HIGH DOSE (65+) PRESERVATIVE FREE IM: ICD-10-PCS | Mod: S$GLB,,, | Performed by: FAMILY MEDICINE

## 2020-03-13 PROCEDURE — 3074F PR MOST RECENT SYSTOLIC BLOOD PRESSURE < 130 MM HG: ICD-10-PCS | Mod: CPTII,S$GLB,, | Performed by: FAMILY MEDICINE

## 2020-03-13 PROCEDURE — 3044F HG A1C LEVEL LT 7.0%: CPT | Mod: CPTII,S$GLB,, | Performed by: FAMILY MEDICINE

## 2020-03-13 PROCEDURE — 3008F PR BODY MASS INDEX (BMI) DOCUMENTED: ICD-10-PCS | Mod: CPTII,S$GLB,, | Performed by: FAMILY MEDICINE

## 2020-03-13 PROCEDURE — G0008 ADMIN INFLUENZA VIRUS VAC: HCPCS | Mod: S$GLB,,, | Performed by: FAMILY MEDICINE

## 2020-03-13 PROCEDURE — 99999 PR PBB SHADOW E&M-EST. PATIENT-LVL V: ICD-10-PCS | Mod: PBBFAC,,, | Performed by: FAMILY MEDICINE

## 2020-03-13 PROCEDURE — 3008F BODY MASS INDEX DOCD: CPT | Mod: CPTII,S$GLB,, | Performed by: FAMILY MEDICINE

## 2020-03-13 PROCEDURE — 1101F PT FALLS ASSESS-DOCD LE1/YR: CPT | Mod: CPTII,S$GLB,, | Performed by: FAMILY MEDICINE

## 2020-03-13 PROCEDURE — 3044F PR MOST RECENT HEMOGLOBIN A1C LEVEL <7.0%: ICD-10-PCS | Mod: CPTII,S$GLB,, | Performed by: FAMILY MEDICINE

## 2020-03-13 PROCEDURE — 90670 PCV13 VACCINE IM: CPT | Mod: S$GLB,,, | Performed by: FAMILY MEDICINE

## 2020-03-13 PROCEDURE — 90662 IIV NO PRSV INCREASED AG IM: CPT | Mod: S$GLB,,, | Performed by: FAMILY MEDICINE

## 2020-03-13 PROCEDURE — 90662 FLU VACCINE - HIGH DOSE (65+) PRESERVATIVE FREE IM: ICD-10-PCS | Mod: S$GLB,,, | Performed by: FAMILY MEDICINE

## 2020-03-13 PROCEDURE — 99214 PR OFFICE/OUTPT VISIT, EST, LEVL IV, 30-39 MIN: ICD-10-PCS | Mod: 25,S$GLB,, | Performed by: FAMILY MEDICINE

## 2020-03-13 PROCEDURE — G0009 PNEUMOCOCCAL CONJUGATE VACCINE 13-VALENT LESS THAN 5YO & GREATER THAN: ICD-10-PCS | Mod: S$GLB,,, | Performed by: FAMILY MEDICINE

## 2020-03-13 PROCEDURE — G0009 ADMIN PNEUMOCOCCAL VACCINE: HCPCS | Mod: S$GLB,,, | Performed by: FAMILY MEDICINE

## 2020-03-13 PROCEDURE — 3078F PR MOST RECENT DIASTOLIC BLOOD PRESSURE < 80 MM HG: ICD-10-PCS | Mod: CPTII,S$GLB,, | Performed by: FAMILY MEDICINE

## 2020-03-13 PROCEDURE — 3078F DIAST BP <80 MM HG: CPT | Mod: CPTII,S$GLB,, | Performed by: FAMILY MEDICINE

## 2020-03-13 RX ORDER — ASPIRIN 81 MG/1
TABLET ORAL
COMMUNITY
End: 2021-10-14

## 2020-03-13 RX ORDER — KETOCONAZOLE 20 MG/G
CREAM TOPICAL
COMMUNITY
Start: 2020-01-22 | End: 2021-07-27 | Stop reason: SDUPTHER

## 2020-03-13 RX ORDER — CANAGLIFLOZIN 300 MG/1
100 TABLET, FILM COATED ORAL DAILY
COMMUNITY
Start: 2020-02-28 | End: 2021-10-14

## 2020-03-13 RX ORDER — ETODOLAC 500 MG/1
500 TABLET, FILM COATED ORAL 2 TIMES DAILY
Qty: 180 TABLET | Refills: 1 | Status: SHIPPED | OUTPATIENT
Start: 2020-03-13 | End: 2020-09-08

## 2020-03-13 RX ORDER — SEMAGLUTIDE 1.34 MG/ML
INJECTION, SOLUTION SUBCUTANEOUS
COMMUNITY
Start: 2020-02-26

## 2020-03-13 NOTE — PROGRESS NOTES
Patient given influenza vaccine left deltoid and prevnar 13 vaccine right deltoid, tolerated well, no complaints, no reaction noted

## 2020-03-18 ENCOUNTER — PATIENT MESSAGE (OUTPATIENT)
Dept: FAMILY MEDICINE | Facility: CLINIC | Age: 65
End: 2020-03-18

## 2020-03-18 DIAGNOSIS — E11.69 DIABETES MELLITUS TYPE 2 IN OBESE: ICD-10-CM

## 2020-03-18 DIAGNOSIS — E66.9 DIABETES MELLITUS TYPE 2 IN OBESE: ICD-10-CM

## 2020-03-18 DIAGNOSIS — E78.5 HYPERLIPIDEMIA, UNSPECIFIED HYPERLIPIDEMIA TYPE: ICD-10-CM

## 2020-03-18 DIAGNOSIS — Z00.00 ANNUAL PHYSICAL EXAM: Primary | ICD-10-CM

## 2020-03-18 DIAGNOSIS — I10 ESSENTIAL HYPERTENSION: ICD-10-CM

## 2020-03-18 DIAGNOSIS — Z12.5 ENCOUNTER FOR SCREENING FOR MALIGNANT NEOPLASM OF PROSTATE: ICD-10-CM

## 2020-04-02 ENCOUNTER — PATIENT MESSAGE (OUTPATIENT)
Dept: FAMILY MEDICINE | Facility: CLINIC | Age: 65
End: 2020-04-02

## 2020-05-07 LAB
ALBUMIN SERPL-MCNC: 4.5 G/DL (ref 3.6–5.1)
ALBUMIN/GLOB SERPL: 2.1 (CALC) (ref 1–2.5)
ALP SERPL-CCNC: 80 U/L (ref 35–144)
ALT SERPL-CCNC: 15 U/L (ref 9–46)
AST SERPL-CCNC: 14 U/L (ref 10–35)
BASOPHILS # BLD AUTO: 37 CELLS/UL (ref 0–200)
BASOPHILS NFR BLD AUTO: 0.5 %
BILIRUB SERPL-MCNC: 0.7 MG/DL (ref 0.2–1.2)
BUN SERPL-MCNC: 26 MG/DL (ref 7–25)
BUN/CREAT SERPL: 23 (CALC) (ref 6–22)
CALCIUM SERPL-MCNC: 9.6 MG/DL (ref 8.6–10.3)
CHLORIDE SERPL-SCNC: 109 MMOL/L (ref 98–110)
CHOLEST SERPL-MCNC: 148 MG/DL
CHOLEST/HDLC SERPL: 3.5 (CALC)
CO2 SERPL-SCNC: 22 MMOL/L (ref 20–32)
CREAT SERPL-MCNC: 1.14 MG/DL (ref 0.7–1.25)
EOSINOPHIL # BLD AUTO: 59 CELLS/UL (ref 15–500)
EOSINOPHIL NFR BLD AUTO: 0.8 %
ERYTHROCYTE [DISTWIDTH] IN BLOOD BY AUTOMATED COUNT: 12.7 % (ref 11–15)
GFRSERPLBLD MDRD-ARVRAT: 67 ML/MIN/1.73M2
GLOBULIN SER CALC-MCNC: 2.1 G/DL (CALC) (ref 1.9–3.7)
GLUCOSE SERPL-MCNC: 109 MG/DL (ref 65–99)
HCT VFR BLD AUTO: 46.6 % (ref 38.5–50)
HDLC SERPL-MCNC: 42 MG/DL
HGB BLD-MCNC: 15.3 G/DL (ref 13.2–17.1)
LDLC SERPL CALC-MCNC: 83 MG/DL (CALC)
LYMPHOCYTES # BLD AUTO: 2227 CELLS/UL (ref 850–3900)
LYMPHOCYTES NFR BLD AUTO: 30.1 %
MCH RBC QN AUTO: 29.7 PG (ref 27–33)
MCHC RBC AUTO-ENTMCNC: 32.8 G/DL (ref 32–36)
MCV RBC AUTO: 90.5 FL (ref 80–100)
MONOCYTES # BLD AUTO: 681 CELLS/UL (ref 200–950)
MONOCYTES NFR BLD AUTO: 9.2 %
NEUTROPHILS # BLD AUTO: 4396 CELLS/UL (ref 1500–7800)
NEUTROPHILS NFR BLD AUTO: 59.4 %
NONHDLC SERPL-MCNC: 106 MG/DL (CALC)
PLATELET # BLD AUTO: 239 THOUSAND/UL (ref 140–400)
PMV BLD REES-ECKER: 9.4 FL (ref 7.5–12.5)
POTASSIUM SERPL-SCNC: 4.8 MMOL/L (ref 3.5–5.3)
PROT SERPL-MCNC: 6.6 G/DL (ref 6.1–8.1)
PSA SERPL-MCNC: 6.7 NG/ML
RBC # BLD AUTO: 5.15 MILLION/UL (ref 4.2–5.8)
SODIUM SERPL-SCNC: 144 MMOL/L (ref 135–146)
TRIGL SERPL-MCNC: 136 MG/DL
TSH SERPL-ACNC: 1.06 MIU/L (ref 0.4–4.5)
WBC # BLD AUTO: 7.4 THOUSAND/UL (ref 3.8–10.8)

## 2020-05-17 DIAGNOSIS — N40.0 BPH WITHOUT OBSTRUCTION/LOWER URINARY TRACT SYMPTOMS: ICD-10-CM

## 2020-05-18 RX ORDER — TAMSULOSIN HYDROCHLORIDE 0.4 MG/1
CAPSULE ORAL
Qty: 30 CAPSULE | Refills: 11 | Status: SHIPPED | OUTPATIENT
Start: 2020-05-18 | End: 2020-07-30 | Stop reason: SDUPTHER

## 2020-05-22 ENCOUNTER — PATIENT MESSAGE (OUTPATIENT)
Dept: FAMILY MEDICINE | Facility: CLINIC | Age: 65
End: 2020-05-22

## 2020-05-27 ENCOUNTER — TELEPHONE (OUTPATIENT)
Dept: OPHTHALMOLOGY | Facility: CLINIC | Age: 65
End: 2020-05-27

## 2020-05-29 ENCOUNTER — TELEPHONE (OUTPATIENT)
Dept: OPHTHALMOLOGY | Facility: CLINIC | Age: 65
End: 2020-05-29

## 2020-05-29 NOTE — TELEPHONE ENCOUNTER
[facility] states that Dr. Sanchez is not in there network. Appt has been r/s to 6/29/2020 with Dr. Pritchett for Diabetic Eye Exam. Pt will need a new CTL Rx with no change of CTL Rx.

## 2020-06-04 ENCOUNTER — PATIENT MESSAGE (OUTPATIENT)
Dept: FAMILY MEDICINE | Facility: CLINIC | Age: 65
End: 2020-06-04

## 2020-06-04 NOTE — TELEPHONE ENCOUNTER
Pt was advised to f/u with urology at  but I do not see a recent note. His PSA is 6.7 and he should follow up really soon.

## 2020-06-04 NOTE — TELEPHONE ENCOUNTER
Pt states he never called to schedule appointment; informed him I would call and schedule for him and send him appointment date and time, pt requested something early in the morning around 8am

## 2020-06-26 ENCOUNTER — PATIENT OUTREACH (OUTPATIENT)
Dept: ADMINISTRATIVE | Facility: OTHER | Age: 65
End: 2020-06-26

## 2020-06-29 ENCOUNTER — OFFICE VISIT (OUTPATIENT)
Dept: OPHTHALMOLOGY | Facility: CLINIC | Age: 65
End: 2020-06-29
Payer: MEDICARE

## 2020-06-29 DIAGNOSIS — I10 ESSENTIAL HYPERTENSION: ICD-10-CM

## 2020-06-29 DIAGNOSIS — E11.9 TYPE 2 DIABETES MELLITUS WITHOUT RETINOPATHY: ICD-10-CM

## 2020-06-29 DIAGNOSIS — H25.13 NUCLEAR SCLEROSIS OF BOTH EYES: Primary | ICD-10-CM

## 2020-06-29 DIAGNOSIS — H52.7 REFRACTIVE ERROR: ICD-10-CM

## 2020-06-29 DIAGNOSIS — Z98.890 HISTORY OF LASER PHOTOCOAGULATION OF RETINA: ICD-10-CM

## 2020-06-29 LAB
LEFT EYE DM RETINOPATHY: NEGATIVE
RIGHT EYE DM RETINOPATHY: NEGATIVE

## 2020-06-29 PROCEDURE — 99999 PR PBB SHADOW E&M-EST. PATIENT-LVL III: CPT | Mod: PBBFAC,,, | Performed by: OPHTHALMOLOGY

## 2020-06-29 PROCEDURE — 92014 COMPRE OPH EXAM EST PT 1/>: CPT | Mod: S$GLB,,, | Performed by: OPHTHALMOLOGY

## 2020-06-29 PROCEDURE — 99499 RISK ADDL DX/OHS AUDIT: ICD-10-PCS | Mod: S$GLB,,, | Performed by: OPHTHALMOLOGY

## 2020-06-29 PROCEDURE — 92014 PR EYE EXAM, EST PATIENT,COMPREHESV: ICD-10-PCS | Mod: S$GLB,,, | Performed by: OPHTHALMOLOGY

## 2020-06-29 PROCEDURE — 99999 PR PBB SHADOW E&M-EST. PATIENT-LVL III: ICD-10-PCS | Mod: PBBFAC,,, | Performed by: OPHTHALMOLOGY

## 2020-06-29 PROCEDURE — 99499 UNLISTED E&M SERVICE: CPT | Mod: S$GLB,,, | Performed by: OPHTHALMOLOGY

## 2020-06-29 RX ORDER — CANAGLIFLOZIN 100 MG/1
100 TABLET, FILM COATED ORAL DAILY
COMMUNITY
Start: 2020-06-15 | End: 2021-10-14

## 2020-06-29 NOTE — LETTER
June 29, 2020      Larry Cr MD  [address]  [facility] [address]           [facility]  [address] [facility]  [place] [address]  Phone: [phone]  Fax: [phone]          Patient: Edel Edwards III   MR Number: [id number]   YOB: 1955   Date of Visit: 6/29/2020       Dear Dr. Larry Cr:    Thank you for referring Edel Edwards to me for evaluation. Attached you will find relevant portions of my assessment and plan of care.    If you have questions, please do not hesitate to call me. I look forward to following Edel Edwards along with you.    Sincerely,    Henrique Pritchett MD    Enclosure  CC:  No Recipients    If you would like to receive this communication electronically, please contact [email or web address] or [phone] to request more information on [facility] access.    For providers and/or their staff who would like to refer a patient to [facility], please contact us through our one-stop-shop provider referral line, [facility], at [phone].    If you feel you have received this communication in error or would no longer like to receive these types of communications, please e-mail [email or web address]

## 2020-07-30 ENCOUNTER — OFFICE VISIT (OUTPATIENT)
Dept: UROLOGY | Facility: CLINIC | Age: 65
End: 2020-07-30
Payer: MEDICARE

## 2020-07-30 VITALS — WEIGHT: 230.63 LBS | TEMPERATURE: 98 F | BODY MASS INDEX: 31.24 KG/M2 | HEIGHT: 72 IN

## 2020-07-30 DIAGNOSIS — N52.9 ED (ERECTILE DYSFUNCTION) OF ORGANIC ORIGIN: ICD-10-CM

## 2020-07-30 DIAGNOSIS — R35.1 NOCTURIA MORE THAN TWICE PER NIGHT: ICD-10-CM

## 2020-07-30 DIAGNOSIS — N40.0 BPH WITHOUT OBSTRUCTION/LOWER URINARY TRACT SYMPTOMS: ICD-10-CM

## 2020-07-30 DIAGNOSIS — R97.20 ELEVATED PSA: Primary | ICD-10-CM

## 2020-07-30 LAB
BILIRUB SERPL-MCNC: NORMAL MG/DL
BLOOD URINE, POC: NORMAL
COLOR, POC UA: YELLOW
GLUCOSE UR QL STRIP: NORMAL
KETONES UR QL STRIP: NORMAL
LEUKOCYTE ESTERASE URINE, POC: NORMAL
NITRITE, POC UA: NORMAL
PH, POC UA: 5
PROTEIN, POC: NORMAL
SPECIFIC GRAVITY, POC UA: 1000
UROBILINOGEN, POC UA: NORMAL

## 2020-07-30 PROCEDURE — 1101F PT FALLS ASSESS-DOCD LE1/YR: CPT | Mod: CPTII,S$GLB,, | Performed by: UROLOGY

## 2020-07-30 PROCEDURE — 99999 PR PBB SHADOW E&M-EST. PATIENT-LVL IV: CPT | Mod: PBBFAC,,, | Performed by: UROLOGY

## 2020-07-30 PROCEDURE — 81001 POCT URINALYSIS, DIPSTICK OR TABLET REAGENT, AUTOMATED, WITH MICROSCOP: ICD-10-PCS | Mod: S$GLB,,, | Performed by: UROLOGY

## 2020-07-30 PROCEDURE — 3008F BODY MASS INDEX DOCD: CPT | Mod: CPTII,S$GLB,, | Performed by: UROLOGY

## 2020-07-30 PROCEDURE — 99999 PR PBB SHADOW E&M-EST. PATIENT-LVL IV: ICD-10-PCS | Mod: PBBFAC,,, | Performed by: UROLOGY

## 2020-07-30 PROCEDURE — 3008F PR BODY MASS INDEX (BMI) DOCUMENTED: ICD-10-PCS | Mod: CPTII,S$GLB,, | Performed by: UROLOGY

## 2020-07-30 PROCEDURE — 1101F PR PT FALLS ASSESS DOC 0-1 FALLS W/OUT INJ PAST YR: ICD-10-PCS | Mod: CPTII,S$GLB,, | Performed by: UROLOGY

## 2020-07-30 PROCEDURE — 99214 PR OFFICE/OUTPT VISIT, EST, LEVL IV, 30-39 MIN: ICD-10-PCS | Mod: 25,S$GLB,, | Performed by: UROLOGY

## 2020-07-30 PROCEDURE — 99214 OFFICE O/P EST MOD 30 MIN: CPT | Mod: 25,S$GLB,, | Performed by: UROLOGY

## 2020-07-30 PROCEDURE — 81001 URINALYSIS AUTO W/SCOPE: CPT | Mod: S$GLB,,, | Performed by: UROLOGY

## 2020-07-30 RX ORDER — TAMSULOSIN HYDROCHLORIDE 0.4 MG/1
1 CAPSULE ORAL DAILY
Qty: 30 CAPSULE | Refills: 11 | Status: SHIPPED | OUTPATIENT
Start: 2020-07-30 | End: 2021-07-06

## 2020-07-30 NOTE — PROGRESS NOTES
Subjective:       Patient ID: Edel Edwards III is a 64 y.o. male who was last seen in this office Visit date not found    Chief Complaint:   Chief Complaint   Patient presents with    Elevated PSA     annual visit with psa     Benign Prostatic Hypertrophy       Elevated PSA  Patient is here with an elevated PSA. He has no personal history and no family history of prostate cancer.  He has a prior genitourinary history of erectile dysfunction, and previous biopsies a few years ago with Dr. Umanzor.  Previous PSA values are :    PROSTATE SPECIFIC ANTIGEN, SCR - [facility] < OR = 4.0 ng/mL 6.7High     Comment: The total PSA value from this assay system is   standardized against the WHO standard. The test   result will be approximately 20% lower when compared   to the equimolar-standardized total PSA (Gladys   Gilles). Comparison of serial PSA results should be   interpreted with this fact in mind.       This test was performed using the Siemens   chemiluminescent method. Values obtained from   different assay methods cannot be used   interchangeably. PSA levels, regardless of   value, should not be interpreted as absolute   evidence of the presence or absence of disease.    Resulting Agency  [facility]      Narrative  Performed by: [facility]  AN UPDATE OR CORRECTION HAS BEEN MADE TO NAME   Resulting Agency's Comment    Performing Organization Information:       Site ID: [facility]       Name: [facility]       Address: [address] [place] [id number]       Director: Sunny Lopez      Specimen Collected: 05/06/20 10:49   Last Resulted: 05/07/20 06:57             Ref Range & Units 6d ago   PSA DIAGNOSTIC 0.00 - 4.00 ng/mL 6.3High     Comment: PSA Expected levels:   Hormonal Therapy: <0.05 ng/ml   Prostatectomy: <0.01 ng/ml   Radiation Therapy: <1.00 ng/ml    Resulting Agency  [facility]         Specimen Collected: 05/01/19 09:38   Last Resulted: 05/01/19 17:16            PSA Total 0.00 - 4.00 ng/mL  5.5     Comments: PSA Expected levels:   Hormonal Therapy: <0.05 ng/ml   Prostatectomy: <0.01 ng/ml   Radiation Therapy: <1.00 ng/ml    PSA, Free 0.01 - 1.50 ng/mL 1.56     PSA, Free Pct Not established % 28.36    Resulting Agency  [facility]      Specimen Collected: 07/19/17 09:20   Last Resulted: 07/19/17 20:12          Lab Results   Component Value Date    PSA 6.3 (H) 11/30/2016    PSA 8.2 (H) 06/17/2015     Benign Prostatic Hyperplasia  He patient reports nocturia two times a night. He denies straining, urgency and weak stream. The patient states symptoms are of moderate severity. Onset of symptoms was several years ago and was gradual in onset.  He has no personal history and no family history of prostate cancer. He reports a history of no complicating symptoms. He denies flank pain, gross hematuria, kidney stones and recurrent UTI.  He is currently taking Flomax.  He has a right directed stream.    Erectile Dysfunction  Patient complains of erectile dysfunction. Onset of dysfunction was several years ago and was gradual in onset.  Patient states the nature of difficulty is maintaining erection. Full erections occur with intercourse. Partial erections occur with intercourse. Libido is not affected. Risk factors for ED include cardiovascular disease and diabetes mellitus. Patient denies history of pelvic radiation. Previous treatment of ED includes Levitra and Cialis.  He is no longer taking these after his recent cardiac stent placement on Imdur Rx.    Penile Rash  He has intermittently had issues with a red rash on his penis for a few year.  It will sometime itch.  He usually applies Desonide or Lotrisone cream but it always returns.      ACTIVE MEDICAL ISSUES:  Patient Active Problem List   Diagnosis    Diabetes mellitus type 2 in obese    HTN (hypertension)    Hyperlipidemia    Type 2 diabetes mellitus without retinopathy - Both Eyes    Nuclear sclerosis - Both Eyes    BMI 33.0-33.9,adult    Elevated PSA     Chest pain, atypical    CAD (coronary artery disease)       ALLERGIES AND MEDICATIONS: updated and reviewed.  Review of patient's allergies indicates:  No Known Allergies  Current Outpatient Medications   Medication Sig    ACCU-CHEK SMARTVIEW TEST STRIP Strp as needed.     aspirin (ECOTRIN) 81 MG EC tablet Take 81 mg by mouth once daily.    atorvastatin (LIPITOR) 40 MG tablet TAKE 1/2 TABLET BY MOUTH EVERY DAY    azilsartan medoxomil (EDARBI) 80 mg Tab Take by mouth once daily.     clopidogreL (PLAVIX) 75 mg tablet TAKE ONE TABLET BY MOUTH EVERY DAY    clotrimazole-betamethasone 1-0.05% (LOTRISONE) cream Apply topically as needed.    etodolac (LODINE) 500 MG tablet Take 1 tablet (500 mg total) by mouth 2 (two) times daily.    gabapentin (NEURONTIN) 800 MG tablet Take 0.5 tablets (400 mg total) by mouth once daily.    INVOKANA 100 mg Tab tablet Take 100 mg by mouth once daily.    isosorbide mononitrate (IMDUR) 30 MG 24 hr tablet TAKE ONE TABLET BY MOUTH EVERY DAY    metformin (GLUCOPHAGE) 1000 MG tablet Take 1,000 mg by mouth 2 (two) times daily with meals.      metoprolol succinate (TOPROL-XL) 25 MG 24 hr tablet TAKE ONE TABLET BY MOUTH once DAILY    OZEMPIC 1 mg/dose (2 mg/1.5 mL) PnIj INJECT 1 MG WEEKLY    tamsulosin (FLOMAX) 0.4 mg Cap Take 1 capsule (0.4 mg total) by mouth once daily.    terazosin (HYTRIN) 2 MG capsule Take 2 mg by mouth every evening.     aspirin (ECOTRIN) 81 MG EC tablet aspirin    diphth,pertus,acell,,tetanus (BOOSTRIX TDAP) 2.5-8-5 Lf-mcg-Lf/0.5mL Syrg injection Boostrix Tdap 2.5 Lf unit-8 mcg-5 Lf/0.5 mL intramuscular syringe   inject 0.5 milliliter intramuscularly    flu vac ts 2016-17,4 yr up,/PF (FLU VAC TS 2014-15,36MOS+,,PF,) 45 mcg (15 mcg x 3)/0.5 mL Flucelvax 5349-3170 (PF) 45 mcg (15 mcg x 3)/0.5 mL IM syringe   inject 0.5 milliliter intramuscularly    influenza (FLULAVAL QUADRIVALENT) 60 mcg (15 mcg x 4)/0.5 mL Syrg vaccine Fluarix Quad 1686-4018 (PF) 60  mcg (15 mcg x 4)/0.5 mL IM syringe   inject 0.5 milliliter intramuscularly    INVOKANA 300 mg Tab tablet Take 100 mg by mouth once daily.     ketoconazole (NIZORAL) 2 % cream as needed.     zoster vaccine live, PF, (ZOSTAVAX, PF,) 19,400 unit/0.65 mL injection Zostavax (PF) 19,400 unit/0.65 mL subcutaneous suspension   inject contents of 1 vial subcutaneously     No current facility-administered medications for this visit.        Review of Systems   Constitutional: Negative for activity change, fatigue, fever and unexpected weight change.   HENT: Negative for congestion.    Eyes: Negative for redness.   Respiratory: Negative for chest tightness and shortness of breath.    Cardiovascular: Negative for chest pain and leg swelling.   Gastrointestinal: Negative for abdominal pain, constipation, diarrhea, nausea and vomiting.   Genitourinary: Negative for dysuria, flank pain, frequency, hematuria, penile pain, penile swelling, scrotal swelling, testicular pain and urgency.   Musculoskeletal: Negative for arthralgias and back pain.   Neurological: Negative for dizziness and light-headedness.   Psychiatric/Behavioral: Negative for behavioral problems and confusion. The patient is not nervous/anxious.    All other systems reviewed and are negative.      Objective:      Vitals:    07/30/20 0828   Temp: 97.6 °F (36.4 °C)   Weight: 104.6 kg (230 lb 9.6 oz)   Height: 6' (1.829 m)     Physical Exam   Nursing note and vitals reviewed.  Constitutional: He is oriented to person, place, and time. He appears well-developed.   HENT:   Head: Normocephalic.   Eyes: Conjunctivae are normal.   Neck: Normal range of motion. Neck supple. No tracheal deviation present. No thyromegaly present.   Cardiovascular: Normal rate and normal heart sounds.    Pulmonary/Chest: Effort normal and breath sounds normal. No respiratory distress. He has no wheezes.   Abdominal: Soft. Bowel sounds are normal. There is no abdominal tenderness. There is no  rebound. No hernia.   Musculoskeletal: Normal range of motion. No tenderness.   Lymphadenopathy:     He has no cervical adenopathy.   Neurological: He is alert and oriented to person, place, and time.   Skin: Skin is warm and dry. No rash noted. No erythema.     Psychiatric: His behavior is normal. Judgment and thought content normal.       Urine dipstick shows negative for all components.  Micro exam: negative for WBC's or RBC's.    Assessment:       1. Elevated PSA    2. BPH without obstruction/lower urinary tract symptoms    3. Nocturia more than twice per night    4. ED (erectile dysfunction) of organic origin          Plan:       1. Elevated PSA    - PSA, total and free; Future    2. BPH without obstruction/lower urinary tract symptoms    - POCT urinalysis, dipstick or tablet reag  - tamsulosin (FLOMAX) 0.4 mg Cap; Take 1 capsule (0.4 mg total) by mouth once daily.  Dispense: 30 capsule; Refill: 11    3. Nocturia more than twice per night  limit    4. ED (erectile dysfunction) of organic origin  IPP if he wants            Follow up in about 1 year (around 7/30/2021) for Follow up, Review PSA.

## 2020-08-04 ENCOUNTER — OFFICE VISIT (OUTPATIENT)
Dept: OPTOMETRY | Facility: CLINIC | Age: 65
End: 2020-08-04
Payer: MEDICARE

## 2020-08-04 DIAGNOSIS — Z46.0 FITTING AND ADJUSTMENT OF SPECTACLES AND CONTACT LENSES: Primary | ICD-10-CM

## 2020-08-04 DIAGNOSIS — H52.4 MYOPIA WITH ASTIGMATISM AND PRESBYOPIA, BILATERAL: ICD-10-CM

## 2020-08-04 DIAGNOSIS — H25.13 NUCLEAR SCLEROSIS, BILATERAL: Primary | ICD-10-CM

## 2020-08-04 DIAGNOSIS — H52.203 MYOPIA WITH ASTIGMATISM AND PRESBYOPIA, BILATERAL: ICD-10-CM

## 2020-08-04 DIAGNOSIS — H52.13 MYOPIA WITH ASTIGMATISM AND PRESBYOPIA, BILATERAL: ICD-10-CM

## 2020-08-04 LAB
LEFT EYE DM RETINOPATHY: NORMAL
RIGHT EYE DM RETINOPATHY: NORMAL

## 2020-08-04 PROCEDURE — 92012 PR EYE EXAM, EST PATIENT,INTERMED: ICD-10-PCS | Mod: S$GLB,,, | Performed by: OPTOMETRIST

## 2020-08-04 PROCEDURE — 92015 DETERMINE REFRACTIVE STATE: CPT | Mod: S$GLB,,, | Performed by: OPTOMETRIST

## 2020-08-04 PROCEDURE — 99999 PR PBB SHADOW E&M-EST. PATIENT-LVL III: ICD-10-PCS | Mod: PBBFAC,,, | Performed by: OPTOMETRIST

## 2020-08-04 PROCEDURE — 92015 PR REFRACTION: ICD-10-PCS | Mod: S$GLB,,, | Performed by: OPTOMETRIST

## 2020-08-04 PROCEDURE — 99999 PR PBB SHADOW E&M-EST. PATIENT-LVL II: ICD-10-PCS | Mod: PBBFAC,,, | Performed by: OPTOMETRIST

## 2020-08-04 PROCEDURE — 99999 PR PBB SHADOW E&M-EST. PATIENT-LVL II: CPT | Mod: PBBFAC,,, | Performed by: OPTOMETRIST

## 2020-08-04 PROCEDURE — 92012 INTRM OPH EXAM EST PATIENT: CPT | Mod: S$GLB,,, | Performed by: OPTOMETRIST

## 2020-08-04 PROCEDURE — 92310 CONTACT LENS FITTING OU: CPT | Mod: CSM,S$GLB,, | Performed by: OPTOMETRIST

## 2020-08-04 PROCEDURE — 92310 PR CONTACT LENS FITTING (NO CHANGE): ICD-10-PCS | Mod: CSM,S$GLB,, | Performed by: OPTOMETRIST

## 2020-08-04 PROCEDURE — 99999 PR PBB SHADOW E&M-EST. PATIENT-LVL III: CPT | Mod: PBBFAC,,, | Performed by: OPTOMETRIST

## 2020-08-04 NOTE — PROGRESS NOTES
ELISE GARZA 06/20 with Dr. Pritchett.  Diabetic  yesterday.  Here for   annual exam and contact exam.  OD feels a little irritated for the past 3   or 4 days, no watering or FBS.  Patient uses saline for dryness. Glasses   about 10 yrs old and patient rarely wears. Patient hasn't noticed any   vision changes specifically.    Hemoglobin A1C       Date                     Value               Ref Range             Status                01/30/2020               6.1                 %                     Final                 06/22/2017               5.7 (H)             4.0 - 5.6 %           Final          03/27/2017               6.0                 4.5 - 6.2 %           Final                 11/30/2016               5.8                 4.5 - 6.2 %           Final                  Last edited by Henrique Sanchez, OD on 8/4/2020  8:33 AM. (History)            Assessment /Plan     For exam results, see Encounter Report.    Nuclear sclerosis, bilateral    Myopia with astigmatism and presbyopia, bilateral      1. Educated pt on presence of cataracts and effects on vision. No surgery at this time. Recheck in one year.  2. New Spec Rx given and Contact lens Rx released to pt. Daily wear only advised, with education to risks of extended wear.  Discussed lens care, compliance and solutions. RTC yearly contact lens follow up.   . Different lens options discussed with patient. RTC 1 year full exam.

## 2020-11-23 DIAGNOSIS — M25.562 CHRONIC PAIN OF BOTH KNEES: ICD-10-CM

## 2020-11-23 DIAGNOSIS — G89.29 CHRONIC PAIN OF BOTH KNEES: ICD-10-CM

## 2020-11-23 DIAGNOSIS — M25.561 CHRONIC PAIN OF BOTH KNEES: ICD-10-CM

## 2020-11-23 RX ORDER — ETODOLAC 500 MG/1
500 TABLET, FILM COATED ORAL 2 TIMES DAILY
Qty: 60 TABLET | Refills: 0 | Status: SHIPPED | OUTPATIENT
Start: 2020-11-23 | End: 2020-12-17

## 2020-11-23 NOTE — TELEPHONE ENCOUNTER
Last Office Visit Info:   The patient's last visit with Osito Thacker MD was on 3/13/2020.    The patient's last visit in current department was on Visit date not found.        Last CBC Results:   Lab Results   Component Value Date    WBC 7.4 05/06/2020    HGB 15.3 05/06/2020    HCT 46.6 05/06/2020     05/06/2020       Last CMP Results  Lab Results   Component Value Date     05/06/2020    K 4.8 05/06/2020     05/06/2020    CO2 22 05/06/2020    BUN 26 (H) 05/06/2020    CREATININE 1.14 05/06/2020    CALCIUM 9.6 05/06/2020    ALBUMIN 4.5 05/06/2020    AST 14 05/06/2020    ALT 15 05/06/2020       Last Lipids  Lab Results   Component Value Date    CHOL 148 05/06/2020    TRIG 136 05/06/2020    HDL 42 05/06/2020    LDLCALC 83 05/06/2020       Last A1C  Lab Results   Component Value Date    HGBA1C 6.1 01/30/2020       Last TSH  Lab Results   Component Value Date    TSH 1.06 05/06/2020         Current Med Refills  Medication List with Changes/Refills   Current Medications    ACCU-CHEK SMARTVIEW TEST STRIP STRP    as needed.        Start Date: 6/22/2015 End Date: --    ASPIRIN (ECOTRIN) 81 MG EC TABLET    Take 81 mg by mouth once daily.       Start Date: --        End Date: --    ASPIRIN (ECOTRIN) 81 MG EC TABLET    aspirin       Start Date: --        End Date: --    ATORVASTATIN (LIPITOR) 40 MG TABLET    TAKE 1/2 TABLET BY MOUTH EVERY DAY       Start Date: 6/8/2020  End Date: --    AZILSARTAN MEDOXOMIL (EDARBI) 80 MG TAB    Take by mouth once daily.        Start Date: --        End Date: --    CLOPIDOGREL (PLAVIX) 75 MG TABLET    TAKE ONE TABLET BY MOUTH EVERY DAY       Start Date: 6/14/2020 End Date: --    CLOTRIMAZOLE-BETAMETHASONE 1-0.05% (LOTRISONE) CREAM    Apply topically as needed.       Start Date: --        End Date: --    DIPHTH,PERTUS,ACELL,,TETANUS (BOOSTRIX TDAP) 2.5-8-5 LF-MCG-LF/0.5ML SYRG INJECTION    Boostrix Tdap 2.5 Lf unit-8 mcg-5 Lf/0.5 mL intramuscular syringe   inject 0.5  milliliter intramuscularly       Start Date: --        End Date: --    ETODOLAC (LODINE) 500 MG TABLET    TAKE 1 TABLET BY MOUTH TWICE A DAY       Start Date: 10/5/2020 End Date: --    FLU VAC TS 2016-17,4 YR UP,/PF (FLU VAC TS 2014-15,36MOS+,,PF,) 45 MCG (15 MCG X 3)/0.5 ML    Flucelvax 1648-4670 (PF) 45 mcg (15 mcg x 3)/0.5 mL IM syringe   inject 0.5 milliliter intramuscularly       Start Date: --        End Date: --    GABAPENTIN (NEURONTIN) 800 MG TABLET    Take 0.5 tablets (400 mg total) by mouth once daily.       Start Date: 5/7/2019  End Date: --    INFLUENZA (FLULAVAL QUADRIVALENT) 60 MCG (15 MCG X 4)/0.5 ML SYRG VACCINE    Fluarix Quad 0484-8168 (PF) 60 mcg (15 mcg x 4)/0.5 mL IM syringe   inject 0.5 milliliter intramuscularly       Start Date: --        End Date: --    INVOKANA 100 MG TAB TABLET    Take 100 mg by mouth once daily.       Start Date: 6/15/2020 End Date: --    INVOKANA 300 MG TAB TABLET    Take 100 mg by mouth once daily.        Start Date: 2/28/2020 End Date: --    ISOSORBIDE MONONITRATE (IMDUR) 30 MG 24 HR TABLET    TAKE ONE TABLET BY MOUTH EVERY DAY       Start Date: 6/14/2020 End Date: --    KETOCONAZOLE (NIZORAL) 2 % CREAM    as needed.        Start Date: 1/22/2020 End Date: --    METFORMIN (GLUCOPHAGE) 1000 MG TABLET    Take 1,000 mg by mouth 2 (two) times daily with meals.         Start Date: --        End Date: --    METOPROLOL SUCCINATE (TOPROL-XL) 25 MG 24 HR TABLET    TAKE ONE TABLET BY MOUTH ONCE DAILY       Start Date: 8/11/2020 End Date: --    OZEMPIC 1 MG/DOSE (2 MG/1.5 ML) PNIJ    INJECT 1 MG WEEKLY       Start Date: 2/26/2020 End Date: --    TAMSULOSIN (FLOMAX) 0.4 MG CAP    Take 1 capsule (0.4 mg total) by mouth once daily.       Start Date: 7/30/2020 End Date: --    TERAZOSIN (HYTRIN) 2 MG CAPSULE    Take 2 mg by mouth every evening.        Start Date: 12/21/2017End Date: --    ZOSTER VACCINE LIVE, PF, (ZOSTAVAX, PF,) 19,400 UNIT/0.65 ML INJECTION    Zostavax (PF) 19,400  unit/0.65 mL subcutaneous suspension   inject contents of 1 vial subcutaneously       Start Date: --        End Date: --       Order(s) placed per written order guidelines:     Please advise.

## 2020-12-14 LAB — A1C: 5.7

## 2021-01-04 DIAGNOSIS — G89.29 CHRONIC PAIN OF BOTH KNEES: ICD-10-CM

## 2021-01-04 DIAGNOSIS — M25.562 CHRONIC PAIN OF BOTH KNEES: ICD-10-CM

## 2021-01-04 DIAGNOSIS — M25.561 CHRONIC PAIN OF BOTH KNEES: ICD-10-CM

## 2021-01-04 RX ORDER — ETODOLAC 500 MG/1
500 TABLET, FILM COATED ORAL 2 TIMES DAILY
Qty: 60 TABLET | Refills: 0 | Status: SHIPPED | OUTPATIENT
Start: 2021-01-04 | End: 2021-05-18 | Stop reason: SDUPTHER

## 2021-01-22 ENCOUNTER — PATIENT OUTREACH (OUTPATIENT)
Dept: ADMINISTRATIVE | Facility: OTHER | Age: 66
End: 2021-01-22

## 2021-01-23 ENCOUNTER — PATIENT OUTREACH (OUTPATIENT)
Dept: ADMINISTRATIVE | Facility: HOSPITAL | Age: 66
End: 2021-01-23

## 2021-04-05 ENCOUNTER — PATIENT MESSAGE (OUTPATIENT)
Dept: ADMINISTRATIVE | Facility: HOSPITAL | Age: 66
End: 2021-04-05

## 2021-04-19 LAB
HBA1C MFR BLD: 5.8 % (ref 4–6)
MICROALBUMIN URINE RANDOM: NORMAL

## 2021-04-21 LAB — HM COLONOSCOPY: NORMAL

## 2021-05-06 ENCOUNTER — PATIENT OUTREACH (OUTPATIENT)
Dept: ADMINISTRATIVE | Facility: HOSPITAL | Age: 66
End: 2021-05-06

## 2021-05-18 DIAGNOSIS — M25.561 CHRONIC PAIN OF BOTH KNEES: ICD-10-CM

## 2021-05-18 DIAGNOSIS — M25.562 CHRONIC PAIN OF BOTH KNEES: ICD-10-CM

## 2021-05-18 DIAGNOSIS — G89.29 CHRONIC PAIN OF BOTH KNEES: ICD-10-CM

## 2021-05-19 RX ORDER — ETODOLAC 500 MG/1
500 TABLET, FILM COATED ORAL 2 TIMES DAILY
Qty: 60 TABLET | Refills: 0 | Status: SHIPPED | OUTPATIENT
Start: 2021-05-19

## 2021-07-07 ENCOUNTER — PATIENT MESSAGE (OUTPATIENT)
Dept: ADMINISTRATIVE | Facility: HOSPITAL | Age: 66
End: 2021-07-07

## 2021-07-27 ENCOUNTER — OFFICE VISIT (OUTPATIENT)
Dept: FAMILY MEDICINE | Facility: CLINIC | Age: 66
End: 2021-07-27
Payer: MEDICARE

## 2021-07-27 VITALS
RESPIRATION RATE: 18 BRPM | OXYGEN SATURATION: 96 % | SYSTOLIC BLOOD PRESSURE: 120 MMHG | DIASTOLIC BLOOD PRESSURE: 62 MMHG | HEART RATE: 67 BPM | WEIGHT: 226.19 LBS | BODY MASS INDEX: 30.64 KG/M2 | HEIGHT: 72 IN | TEMPERATURE: 98 F

## 2021-07-27 DIAGNOSIS — R21 PENILE RASH: ICD-10-CM

## 2021-07-27 DIAGNOSIS — L72.0 EPIDERMOID CYST: Primary | ICD-10-CM

## 2021-07-27 PROCEDURE — 3008F BODY MASS INDEX DOCD: CPT | Mod: CPTII,S$GLB,, | Performed by: NURSE PRACTITIONER

## 2021-07-27 PROCEDURE — 1126F PR PAIN SEVERITY QUANTIFIED, NO PAIN PRESENT: ICD-10-PCS | Mod: CPTII,S$GLB,, | Performed by: NURSE PRACTITIONER

## 2021-07-27 PROCEDURE — 3044F HG A1C LEVEL LT 7.0%: CPT | Mod: CPTII,S$GLB,, | Performed by: NURSE PRACTITIONER

## 2021-07-27 PROCEDURE — 1126F AMNT PAIN NOTED NONE PRSNT: CPT | Mod: CPTII,S$GLB,, | Performed by: NURSE PRACTITIONER

## 2021-07-27 PROCEDURE — 3008F PR BODY MASS INDEX (BMI) DOCUMENTED: ICD-10-PCS | Mod: CPTII,S$GLB,, | Performed by: NURSE PRACTITIONER

## 2021-07-27 PROCEDURE — 99214 OFFICE O/P EST MOD 30 MIN: CPT | Mod: S$GLB,,, | Performed by: NURSE PRACTITIONER

## 2021-07-27 PROCEDURE — 99999 PR PBB SHADOW E&M-EST. PATIENT-LVL IV: ICD-10-PCS | Mod: PBBFAC,,, | Performed by: NURSE PRACTITIONER

## 2021-07-27 PROCEDURE — 3078F DIAST BP <80 MM HG: CPT | Mod: CPTII,S$GLB,, | Performed by: NURSE PRACTITIONER

## 2021-07-27 PROCEDURE — 99999 PR PBB SHADOW E&M-EST. PATIENT-LVL IV: CPT | Mod: PBBFAC,,, | Performed by: NURSE PRACTITIONER

## 2021-07-27 PROCEDURE — 1101F PT FALLS ASSESS-DOCD LE1/YR: CPT | Mod: CPTII,S$GLB,, | Performed by: NURSE PRACTITIONER

## 2021-07-27 PROCEDURE — 1159F PR MEDICATION LIST DOCUMENTED IN MEDICAL RECORD: ICD-10-PCS | Mod: CPTII,S$GLB,, | Performed by: NURSE PRACTITIONER

## 2021-07-27 PROCEDURE — 3074F PR MOST RECENT SYSTOLIC BLOOD PRESSURE < 130 MM HG: ICD-10-PCS | Mod: CPTII,S$GLB,, | Performed by: NURSE PRACTITIONER

## 2021-07-27 PROCEDURE — 3072F LOW RISK FOR RETINOPATHY: CPT | Mod: CPTII,S$GLB,, | Performed by: NURSE PRACTITIONER

## 2021-07-27 PROCEDURE — 1159F MED LIST DOCD IN RCRD: CPT | Mod: CPTII,S$GLB,, | Performed by: NURSE PRACTITIONER

## 2021-07-27 PROCEDURE — 3078F PR MOST RECENT DIASTOLIC BLOOD PRESSURE < 80 MM HG: ICD-10-PCS | Mod: CPTII,S$GLB,, | Performed by: NURSE PRACTITIONER

## 2021-07-27 PROCEDURE — 1160F PR REVIEW ALL MEDS BY PRESCRIBER/CLIN PHARMACIST DOCUMENTED: ICD-10-PCS | Mod: CPTII,S$GLB,, | Performed by: NURSE PRACTITIONER

## 2021-07-27 PROCEDURE — 3044F PR MOST RECENT HEMOGLOBIN A1C LEVEL <7.0%: ICD-10-PCS | Mod: CPTII,S$GLB,, | Performed by: NURSE PRACTITIONER

## 2021-07-27 PROCEDURE — 3074F SYST BP LT 130 MM HG: CPT | Mod: CPTII,S$GLB,, | Performed by: NURSE PRACTITIONER

## 2021-07-27 PROCEDURE — 99214 PR OFFICE/OUTPT VISIT, EST, LEVL IV, 30-39 MIN: ICD-10-PCS | Mod: S$GLB,,, | Performed by: NURSE PRACTITIONER

## 2021-07-27 PROCEDURE — 1160F RVW MEDS BY RX/DR IN RCRD: CPT | Mod: CPTII,S$GLB,, | Performed by: NURSE PRACTITIONER

## 2021-07-27 PROCEDURE — 1101F PR PT FALLS ASSESS DOC 0-1 FALLS W/OUT INJ PAST YR: ICD-10-PCS | Mod: CPTII,S$GLB,, | Performed by: NURSE PRACTITIONER

## 2021-07-27 PROCEDURE — 3072F PR LOW RISK FOR RETINOPATHY: ICD-10-PCS | Mod: CPTII,S$GLB,, | Performed by: NURSE PRACTITIONER

## 2021-07-27 PROCEDURE — 3288F PR FALLS RISK ASSESSMENT DOCUMENTED: ICD-10-PCS | Mod: CPTII,S$GLB,, | Performed by: NURSE PRACTITIONER

## 2021-07-27 PROCEDURE — 3288F FALL RISK ASSESSMENT DOCD: CPT | Mod: CPTII,S$GLB,, | Performed by: NURSE PRACTITIONER

## 2021-07-27 RX ORDER — MOMETASONE FUROATE 1 MG/G
CREAM TOPICAL DAILY
Qty: 45 G | Refills: 0 | Status: SHIPPED | OUTPATIENT
Start: 2021-07-27

## 2021-07-27 RX ORDER — KETOCONAZOLE 20 MG/G
CREAM TOPICAL 2 TIMES DAILY
Qty: 60 G | Refills: 0 | Status: SHIPPED | OUTPATIENT
Start: 2021-07-27

## 2021-08-13 ENCOUNTER — OFFICE VISIT (OUTPATIENT)
Dept: CARDIOLOGY | Facility: CLINIC | Age: 66
End: 2021-08-13
Payer: MEDICARE

## 2021-08-13 VITALS
HEART RATE: 76 BPM | SYSTOLIC BLOOD PRESSURE: 104 MMHG | WEIGHT: 213.88 LBS | DIASTOLIC BLOOD PRESSURE: 70 MMHG | BODY MASS INDEX: 28.97 KG/M2 | OXYGEN SATURATION: 96 % | HEIGHT: 72 IN

## 2021-08-13 DIAGNOSIS — E78.5 HYPERLIPIDEMIA, UNSPECIFIED HYPERLIPIDEMIA TYPE: ICD-10-CM

## 2021-08-13 DIAGNOSIS — R07.89 CHEST PAIN, ATYPICAL: ICD-10-CM

## 2021-08-13 DIAGNOSIS — E66.9 DIABETES MELLITUS TYPE 2 IN OBESE: ICD-10-CM

## 2021-08-13 DIAGNOSIS — I25.10 CORONARY ARTERY DISEASE INVOLVING NATIVE CORONARY ARTERY OF NATIVE HEART WITHOUT ANGINA PECTORIS: ICD-10-CM

## 2021-08-13 DIAGNOSIS — I10 ESSENTIAL HYPERTENSION: Primary | ICD-10-CM

## 2021-08-13 DIAGNOSIS — E11.69 DIABETES MELLITUS TYPE 2 IN OBESE: ICD-10-CM

## 2021-08-13 DIAGNOSIS — R06.09 DOE (DYSPNEA ON EXERTION): ICD-10-CM

## 2021-08-13 PROCEDURE — 1159F MED LIST DOCD IN RCRD: CPT | Mod: CPTII,S$GLB,, | Performed by: INTERNAL MEDICINE

## 2021-08-13 PROCEDURE — 1126F PR PAIN SEVERITY QUANTIFIED, NO PAIN PRESENT: ICD-10-PCS | Mod: CPTII,S$GLB,, | Performed by: INTERNAL MEDICINE

## 2021-08-13 PROCEDURE — 3288F PR FALLS RISK ASSESSMENT DOCUMENTED: ICD-10-PCS | Mod: CPTII,S$GLB,, | Performed by: INTERNAL MEDICINE

## 2021-08-13 PROCEDURE — 3078F DIAST BP <80 MM HG: CPT | Mod: CPTII,S$GLB,, | Performed by: INTERNAL MEDICINE

## 2021-08-13 PROCEDURE — 3072F LOW RISK FOR RETINOPATHY: CPT | Mod: CPTII,S$GLB,, | Performed by: INTERNAL MEDICINE

## 2021-08-13 PROCEDURE — 99999 PR PBB SHADOW E&M-EST. PATIENT-LVL V: CPT | Mod: PBBFAC,,, | Performed by: INTERNAL MEDICINE

## 2021-08-13 PROCEDURE — 3008F PR BODY MASS INDEX (BMI) DOCUMENTED: ICD-10-PCS | Mod: CPTII,S$GLB,, | Performed by: INTERNAL MEDICINE

## 2021-08-13 PROCEDURE — 3044F HG A1C LEVEL LT 7.0%: CPT | Mod: CPTII,S$GLB,, | Performed by: INTERNAL MEDICINE

## 2021-08-13 PROCEDURE — 3008F BODY MASS INDEX DOCD: CPT | Mod: CPTII,S$GLB,, | Performed by: INTERNAL MEDICINE

## 2021-08-13 PROCEDURE — 3044F PR MOST RECENT HEMOGLOBIN A1C LEVEL <7.0%: ICD-10-PCS | Mod: CPTII,S$GLB,, | Performed by: INTERNAL MEDICINE

## 2021-08-13 PROCEDURE — 3288F FALL RISK ASSESSMENT DOCD: CPT | Mod: CPTII,S$GLB,, | Performed by: INTERNAL MEDICINE

## 2021-08-13 PROCEDURE — 1101F PT FALLS ASSESS-DOCD LE1/YR: CPT | Mod: CPTII,S$GLB,, | Performed by: INTERNAL MEDICINE

## 2021-08-13 PROCEDURE — 99214 OFFICE O/P EST MOD 30 MIN: CPT | Mod: S$GLB,,, | Performed by: INTERNAL MEDICINE

## 2021-08-13 PROCEDURE — 3074F SYST BP LT 130 MM HG: CPT | Mod: CPTII,S$GLB,, | Performed by: INTERNAL MEDICINE

## 2021-08-13 PROCEDURE — 93000 EKG 12-LEAD: ICD-10-PCS | Mod: S$GLB,,, | Performed by: INTERNAL MEDICINE

## 2021-08-13 PROCEDURE — 3078F PR MOST RECENT DIASTOLIC BLOOD PRESSURE < 80 MM HG: ICD-10-PCS | Mod: CPTII,S$GLB,, | Performed by: INTERNAL MEDICINE

## 2021-08-13 PROCEDURE — 1126F AMNT PAIN NOTED NONE PRSNT: CPT | Mod: CPTII,S$GLB,, | Performed by: INTERNAL MEDICINE

## 2021-08-13 PROCEDURE — 99999 PR PBB SHADOW E&M-EST. PATIENT-LVL V: ICD-10-PCS | Mod: PBBFAC,,, | Performed by: INTERNAL MEDICINE

## 2021-08-13 PROCEDURE — 93000 ELECTROCARDIOGRAM COMPLETE: CPT | Mod: S$GLB,,, | Performed by: INTERNAL MEDICINE

## 2021-08-13 PROCEDURE — 3074F PR MOST RECENT SYSTOLIC BLOOD PRESSURE < 130 MM HG: ICD-10-PCS | Mod: CPTII,S$GLB,, | Performed by: INTERNAL MEDICINE

## 2021-08-13 PROCEDURE — 1101F PR PT FALLS ASSESS DOC 0-1 FALLS W/OUT INJ PAST YR: ICD-10-PCS | Mod: CPTII,S$GLB,, | Performed by: INTERNAL MEDICINE

## 2021-08-13 PROCEDURE — 99499 RISK ADDL DX/OHS AUDIT: ICD-10-PCS | Mod: S$GLB,,, | Performed by: INTERNAL MEDICINE

## 2021-08-13 PROCEDURE — 3072F PR LOW RISK FOR RETINOPATHY: ICD-10-PCS | Mod: CPTII,S$GLB,, | Performed by: INTERNAL MEDICINE

## 2021-08-13 PROCEDURE — 1159F PR MEDICATION LIST DOCUMENTED IN MEDICAL RECORD: ICD-10-PCS | Mod: CPTII,S$GLB,, | Performed by: INTERNAL MEDICINE

## 2021-08-13 PROCEDURE — 99214 PR OFFICE/OUTPT VISIT, EST, LEVL IV, 30-39 MIN: ICD-10-PCS | Mod: S$GLB,,, | Performed by: INTERNAL MEDICINE

## 2021-08-13 PROCEDURE — 99499 UNLISTED E&M SERVICE: CPT | Mod: S$GLB,,, | Performed by: INTERNAL MEDICINE

## 2021-08-13 RX ORDER — METHYLPREDNISOLONE 4 MG/1
TABLET ORAL
COMMUNITY

## 2021-08-25 ENCOUNTER — LAB VISIT (OUTPATIENT)
Dept: LAB | Facility: HOSPITAL | Age: 66
End: 2021-08-25
Attending: UROLOGY
Payer: MEDICARE

## 2021-08-25 DIAGNOSIS — R97.20 ELEVATED PSA: ICD-10-CM

## 2021-08-25 LAB
PROSTATE SPECIFIC ANTIGEN, TOTAL: 5.9 NG/ML (ref 0–4)
PSA FREE MFR SERPL: 34.24 %
PSA FREE SERPL-MCNC: 2.02 NG/ML (ref 0–1.5)

## 2021-08-25 PROCEDURE — 84154 ASSAY OF PSA FREE: CPT | Performed by: UROLOGY

## 2021-08-25 PROCEDURE — 36415 COLL VENOUS BLD VENIPUNCTURE: CPT | Mod: PO | Performed by: UROLOGY

## 2021-08-25 PROCEDURE — 84153 ASSAY OF PSA TOTAL: CPT | Performed by: UROLOGY

## 2021-08-26 ENCOUNTER — OFFICE VISIT (OUTPATIENT)
Dept: UROLOGY | Facility: CLINIC | Age: 66
End: 2021-08-26
Payer: MEDICARE

## 2021-08-26 VITALS — HEIGHT: 72 IN | BODY MASS INDEX: 30.22 KG/M2 | WEIGHT: 223.13 LBS

## 2021-08-26 DIAGNOSIS — R35.1 NOCTURIA MORE THAN TWICE PER NIGHT: ICD-10-CM

## 2021-08-26 DIAGNOSIS — R97.20 ELEVATED PSA: ICD-10-CM

## 2021-08-26 DIAGNOSIS — N40.0 BPH WITHOUT OBSTRUCTION/LOWER URINARY TRACT SYMPTOMS: Primary | ICD-10-CM

## 2021-08-26 DIAGNOSIS — N52.9 ED (ERECTILE DYSFUNCTION) OF ORGANIC ORIGIN: ICD-10-CM

## 2021-08-26 LAB
BILIRUB SERPL-MCNC: NORMAL MG/DL
BLOOD URINE, POC: NORMAL
COLOR, POC UA: YELLOW
GLUCOSE UR QL STRIP: POSITIVE
KETONES UR QL STRIP: NORMAL
LEUKOCYTE ESTERASE URINE, POC: NORMAL
NITRITE, POC UA: NORMAL
PH, POC UA: 5
PROTEIN, POC: NORMAL
SPECIFIC GRAVITY, POC UA: 1020
UROBILINOGEN, POC UA: NORMAL

## 2021-08-26 PROCEDURE — 3288F PR FALLS RISK ASSESSMENT DOCUMENTED: ICD-10-PCS | Mod: CPTII,S$GLB,, | Performed by: UROLOGY

## 2021-08-26 PROCEDURE — 1101F PT FALLS ASSESS-DOCD LE1/YR: CPT | Mod: CPTII,S$GLB,, | Performed by: UROLOGY

## 2021-08-26 PROCEDURE — 1159F MED LIST DOCD IN RCRD: CPT | Mod: CPTII,S$GLB,, | Performed by: UROLOGY

## 2021-08-26 PROCEDURE — 99999 PR PBB SHADOW E&M-EST. PATIENT-LVL IV: CPT | Mod: PBBFAC,,, | Performed by: UROLOGY

## 2021-08-26 PROCEDURE — 99214 OFFICE O/P EST MOD 30 MIN: CPT | Mod: S$GLB,,, | Performed by: UROLOGY

## 2021-08-26 PROCEDURE — 99214 PR OFFICE/OUTPT VISIT, EST, LEVL IV, 30-39 MIN: ICD-10-PCS | Mod: S$GLB,,, | Performed by: UROLOGY

## 2021-08-26 PROCEDURE — 3072F PR LOW RISK FOR RETINOPATHY: ICD-10-PCS | Mod: CPTII,S$GLB,, | Performed by: UROLOGY

## 2021-08-26 PROCEDURE — 3044F PR MOST RECENT HEMOGLOBIN A1C LEVEL <7.0%: ICD-10-PCS | Mod: CPTII,S$GLB,, | Performed by: UROLOGY

## 2021-08-26 PROCEDURE — 1126F AMNT PAIN NOTED NONE PRSNT: CPT | Mod: CPTII,S$GLB,, | Performed by: UROLOGY

## 2021-08-26 PROCEDURE — 3008F PR BODY MASS INDEX (BMI) DOCUMENTED: ICD-10-PCS | Mod: CPTII,S$GLB,, | Performed by: UROLOGY

## 2021-08-26 PROCEDURE — 1159F PR MEDICATION LIST DOCUMENTED IN MEDICAL RECORD: ICD-10-PCS | Mod: CPTII,S$GLB,, | Performed by: UROLOGY

## 2021-08-26 PROCEDURE — 3008F BODY MASS INDEX DOCD: CPT | Mod: CPTII,S$GLB,, | Performed by: UROLOGY

## 2021-08-26 PROCEDURE — 1160F RVW MEDS BY RX/DR IN RCRD: CPT | Mod: CPTII,S$GLB,, | Performed by: UROLOGY

## 2021-08-26 PROCEDURE — 99999 PR PBB SHADOW E&M-EST. PATIENT-LVL IV: ICD-10-PCS | Mod: PBBFAC,,, | Performed by: UROLOGY

## 2021-08-26 PROCEDURE — 1126F PR PAIN SEVERITY QUANTIFIED, NO PAIN PRESENT: ICD-10-PCS | Mod: CPTII,S$GLB,, | Performed by: UROLOGY

## 2021-08-26 PROCEDURE — 81001 PR  URINALYSIS, AUTO, W/SCOPE: ICD-10-PCS | Mod: S$GLB,,, | Performed by: UROLOGY

## 2021-08-26 PROCEDURE — 3072F LOW RISK FOR RETINOPATHY: CPT | Mod: CPTII,S$GLB,, | Performed by: UROLOGY

## 2021-08-26 PROCEDURE — 1101F PR PT FALLS ASSESS DOC 0-1 FALLS W/OUT INJ PAST YR: ICD-10-PCS | Mod: CPTII,S$GLB,, | Performed by: UROLOGY

## 2021-08-26 PROCEDURE — 3044F HG A1C LEVEL LT 7.0%: CPT | Mod: CPTII,S$GLB,, | Performed by: UROLOGY

## 2021-08-26 PROCEDURE — 3288F FALL RISK ASSESSMENT DOCD: CPT | Mod: CPTII,S$GLB,, | Performed by: UROLOGY

## 2021-08-26 PROCEDURE — 81001 URINALYSIS AUTO W/SCOPE: CPT | Mod: S$GLB,,, | Performed by: UROLOGY

## 2021-08-26 PROCEDURE — 1160F PR REVIEW ALL MEDS BY PRESCRIBER/CLIN PHARMACIST DOCUMENTED: ICD-10-PCS | Mod: CPTII,S$GLB,, | Performed by: UROLOGY

## 2021-08-26 RX ORDER — TAMSULOSIN HYDROCHLORIDE 0.4 MG/1
1 CAPSULE ORAL DAILY
Qty: 90 CAPSULE | Refills: 3 | Status: SHIPPED | OUTPATIENT
Start: 2021-08-26 | End: 2021-12-23 | Stop reason: SDUPTHER

## 2021-09-30 ENCOUNTER — TELEPHONE (OUTPATIENT)
Dept: OPTOMETRY | Facility: CLINIC | Age: 66
End: 2021-09-30

## 2021-09-30 ENCOUNTER — TELEPHONE (OUTPATIENT)
Dept: OPHTHALMOLOGY | Facility: CLINIC | Age: 66
End: 2021-09-30

## 2021-10-04 ENCOUNTER — OFFICE VISIT (OUTPATIENT)
Dept: OPHTHALMOLOGY | Facility: CLINIC | Age: 66
End: 2021-10-04
Payer: MEDICARE

## 2021-10-04 DIAGNOSIS — H25.13 NUCLEAR SCLEROSIS OF BOTH EYES: Primary | ICD-10-CM

## 2021-10-04 DIAGNOSIS — H52.7 REFRACTIVE ERROR: ICD-10-CM

## 2021-10-04 DIAGNOSIS — Z98.890 HISTORY OF LASER PHOTOCOAGULATION OF RETINA: ICD-10-CM

## 2021-10-04 DIAGNOSIS — E11.9 TYPE 2 DIABETES MELLITUS WITHOUT RETINOPATHY: ICD-10-CM

## 2021-10-04 DIAGNOSIS — I10 ESSENTIAL HYPERTENSION: ICD-10-CM

## 2021-10-04 PROCEDURE — 4010F PR ACE/ARB THEARPY RXD/TAKEN: ICD-10-PCS | Mod: CPTII,S$GLB,, | Performed by: OPHTHALMOLOGY

## 2021-10-04 PROCEDURE — 3044F HG A1C LEVEL LT 7.0%: CPT | Mod: CPTII,S$GLB,, | Performed by: OPHTHALMOLOGY

## 2021-10-04 PROCEDURE — 3288F FALL RISK ASSESSMENT DOCD: CPT | Mod: CPTII,S$GLB,, | Performed by: OPHTHALMOLOGY

## 2021-10-04 PROCEDURE — 1126F PR PAIN SEVERITY QUANTIFIED, NO PAIN PRESENT: ICD-10-PCS | Mod: CPTII,S$GLB,, | Performed by: OPHTHALMOLOGY

## 2021-10-04 PROCEDURE — 1101F PR PT FALLS ASSESS DOC 0-1 FALLS W/OUT INJ PAST YR: ICD-10-PCS | Mod: CPTII,S$GLB,, | Performed by: OPHTHALMOLOGY

## 2021-10-04 PROCEDURE — 99499 UNLISTED E&M SERVICE: CPT | Mod: S$GLB,,, | Performed by: OPHTHALMOLOGY

## 2021-10-04 PROCEDURE — 4010F ACE/ARB THERAPY RXD/TAKEN: CPT | Mod: CPTII,S$GLB,, | Performed by: OPHTHALMOLOGY

## 2021-10-04 PROCEDURE — 1159F PR MEDICATION LIST DOCUMENTED IN MEDICAL RECORD: ICD-10-PCS | Mod: CPTII,S$GLB,, | Performed by: OPHTHALMOLOGY

## 2021-10-04 PROCEDURE — 1160F PR REVIEW ALL MEDS BY PRESCRIBER/CLIN PHARMACIST DOCUMENTED: ICD-10-PCS | Mod: CPTII,S$GLB,, | Performed by: OPHTHALMOLOGY

## 2021-10-04 PROCEDURE — 99499 RISK ADDL DX/OHS AUDIT: ICD-10-PCS | Mod: S$GLB,,, | Performed by: OPHTHALMOLOGY

## 2021-10-04 PROCEDURE — 2023F PR DILATED RETINAL EXAM W/O EVID OF RETINOPATHY: ICD-10-PCS | Mod: CPTII,S$GLB,, | Performed by: OPHTHALMOLOGY

## 2021-10-04 PROCEDURE — 99999 PR PBB SHADOW E&M-EST. PATIENT-LVL III: ICD-10-PCS | Mod: PBBFAC,,, | Performed by: OPHTHALMOLOGY

## 2021-10-04 PROCEDURE — 99999 PR PBB SHADOW E&M-EST. PATIENT-LVL III: CPT | Mod: PBBFAC,,, | Performed by: OPHTHALMOLOGY

## 2021-10-04 PROCEDURE — 92014 COMPRE OPH EXAM EST PT 1/>: CPT | Mod: S$GLB,,, | Performed by: OPHTHALMOLOGY

## 2021-10-04 PROCEDURE — 1160F RVW MEDS BY RX/DR IN RCRD: CPT | Mod: CPTII,S$GLB,, | Performed by: OPHTHALMOLOGY

## 2021-10-04 PROCEDURE — 1101F PT FALLS ASSESS-DOCD LE1/YR: CPT | Mod: CPTII,S$GLB,, | Performed by: OPHTHALMOLOGY

## 2021-10-04 PROCEDURE — 3044F PR MOST RECENT HEMOGLOBIN A1C LEVEL <7.0%: ICD-10-PCS | Mod: CPTII,S$GLB,, | Performed by: OPHTHALMOLOGY

## 2021-10-04 PROCEDURE — 1159F MED LIST DOCD IN RCRD: CPT | Mod: CPTII,S$GLB,, | Performed by: OPHTHALMOLOGY

## 2021-10-04 PROCEDURE — 2023F DILAT RTA XM W/O RTNOPTHY: CPT | Mod: CPTII,S$GLB,, | Performed by: OPHTHALMOLOGY

## 2021-10-04 PROCEDURE — 1126F AMNT PAIN NOTED NONE PRSNT: CPT | Mod: CPTII,S$GLB,, | Performed by: OPHTHALMOLOGY

## 2021-10-04 PROCEDURE — 3288F PR FALLS RISK ASSESSMENT DOCUMENTED: ICD-10-PCS | Mod: CPTII,S$GLB,, | Performed by: OPHTHALMOLOGY

## 2021-10-04 PROCEDURE — 92014 PR EYE EXAM, EST PATIENT,COMPREHESV: ICD-10-PCS | Mod: S$GLB,,, | Performed by: OPHTHALMOLOGY

## 2021-10-14 ENCOUNTER — OFFICE VISIT (OUTPATIENT)
Dept: OPTOMETRY | Facility: CLINIC | Age: 66
End: 2021-10-14
Payer: MEDICARE

## 2021-10-14 DIAGNOSIS — Z46.0 ENCOUNTER FOR FITTING OR ADJUSTMENT OF SPECTACLES OR CONTACT LENSES: Primary | ICD-10-CM

## 2021-10-14 DIAGNOSIS — Z97.3 WEARS CONTACT LENSES: ICD-10-CM

## 2021-10-14 DIAGNOSIS — H52.7 REFRACTIVE ERROR: Primary | ICD-10-CM

## 2021-10-14 PROCEDURE — 99499 NO LOS: ICD-10-PCS | Mod: S$GLB,,, | Performed by: OPTOMETRIST

## 2021-10-14 PROCEDURE — 3044F HG A1C LEVEL LT 7.0%: CPT | Mod: CPTII,S$GLB,, | Performed by: OPTOMETRIST

## 2021-10-14 PROCEDURE — 1159F MED LIST DOCD IN RCRD: CPT | Mod: CPTII,S$GLB,, | Performed by: OPTOMETRIST

## 2021-10-14 PROCEDURE — 92310 CONTACT LENS FITTING OU: CPT | Mod: CSM,S$GLB,, | Performed by: OPTOMETRIST

## 2021-10-14 PROCEDURE — 92015 DETERMINE REFRACTIVE STATE: CPT | Mod: S$GLB,,, | Performed by: OPTOMETRIST

## 2021-10-14 PROCEDURE — 3044F PR MOST RECENT HEMOGLOBIN A1C LEVEL <7.0%: ICD-10-PCS | Mod: CPTII,S$GLB,, | Performed by: OPTOMETRIST

## 2021-10-14 PROCEDURE — 1160F RVW MEDS BY RX/DR IN RCRD: CPT | Mod: CPTII,S$GLB,, | Performed by: OPTOMETRIST

## 2021-10-14 PROCEDURE — 99999 PR PBB SHADOW E&M-EST. PATIENT-LVL III: ICD-10-PCS | Mod: PBBFAC,,, | Performed by: OPTOMETRIST

## 2021-10-14 PROCEDURE — 1159F PR MEDICATION LIST DOCUMENTED IN MEDICAL RECORD: ICD-10-PCS | Mod: CPTII,S$GLB,, | Performed by: OPTOMETRIST

## 2021-10-14 PROCEDURE — 99499 UNLISTED E&M SERVICE: CPT | Mod: S$GLB,,, | Performed by: OPTOMETRIST

## 2021-10-14 PROCEDURE — 92015 PR REFRACTION: ICD-10-PCS | Mod: S$GLB,,, | Performed by: OPTOMETRIST

## 2021-10-14 PROCEDURE — 1160F PR REVIEW ALL MEDS BY PRESCRIBER/CLIN PHARMACIST DOCUMENTED: ICD-10-PCS | Mod: CPTII,S$GLB,, | Performed by: OPTOMETRIST

## 2021-10-14 PROCEDURE — 4010F PR ACE/ARB THEARPY RXD/TAKEN: ICD-10-PCS | Mod: CPTII,S$GLB,, | Performed by: OPTOMETRIST

## 2021-10-14 PROCEDURE — 99999 PR PBB SHADOW E&M-EST. PATIENT-LVL III: CPT | Mod: PBBFAC,,, | Performed by: OPTOMETRIST

## 2021-10-14 PROCEDURE — 92310 PR CONTACT LENS FITTING (NO CHANGE): ICD-10-PCS | Mod: CSM,S$GLB,, | Performed by: OPTOMETRIST

## 2021-10-14 PROCEDURE — 4010F ACE/ARB THERAPY RXD/TAKEN: CPT | Mod: CPTII,S$GLB,, | Performed by: OPTOMETRIST

## 2021-10-27 ENCOUNTER — HOSPITAL ENCOUNTER (OUTPATIENT)
Dept: RADIOLOGY | Facility: HOSPITAL | Age: 66
Discharge: HOME | End: 2021-10-27
Attending: INTERNAL MEDICINE
Payer: MEDICARE

## 2021-10-27 ENCOUNTER — HOSPITAL ENCOUNTER (OUTPATIENT)
Dept: CARDIOLOGY | Facility: HOSPITAL | Age: 66
Discharge: HOME | End: 2021-10-27
Attending: INTERNAL MEDICINE
Payer: MEDICARE

## 2021-10-27 VITALS
SYSTOLIC BLOOD PRESSURE: 104 MMHG | DIASTOLIC BLOOD PRESSURE: 70 MMHG | BODY MASS INDEX: 30.2 KG/M2 | HEIGHT: 72 IN | WEIGHT: 223 LBS

## 2021-10-27 DIAGNOSIS — E66.9 DIABETES MELLITUS TYPE 2 IN OBESE: ICD-10-CM

## 2021-10-27 DIAGNOSIS — I25.10 CORONARY ARTERY DISEASE INVOLVING NATIVE CORONARY ARTERY OF NATIVE HEART WITHOUT ANGINA PECTORIS: ICD-10-CM

## 2021-10-27 DIAGNOSIS — E78.5 HYPERLIPIDEMIA, UNSPECIFIED HYPERLIPIDEMIA TYPE: ICD-10-CM

## 2021-10-27 DIAGNOSIS — R07.89 CHEST PAIN, ATYPICAL: ICD-10-CM

## 2021-10-27 DIAGNOSIS — R06.09 DOE (DYSPNEA ON EXERTION): ICD-10-CM

## 2021-10-27 DIAGNOSIS — E11.69 DIABETES MELLITUS TYPE 2 IN OBESE: ICD-10-CM

## 2021-10-27 DIAGNOSIS — I10 ESSENTIAL HYPERTENSION: ICD-10-CM

## 2021-10-27 LAB
AORTIC ROOT ANNULUS: 4.06 CM
AORTIC VALVE CUSP SEPERATION: 2.58 CM
AV INDEX (PROSTH): 0.74
AV MEAN GRADIENT: 6 MMHG
AV PEAK GRADIENT: 10 MMHG
AV VALVE AREA: 3.68 CM2
AV VELOCITY RATIO: 1.02
BSA FOR ECHO PROCEDURE: 2.27 M2
CV ECHO LV RWT: 0.51 CM
CV STRESS BASE HR: 69 BPM
DIASTOLIC BLOOD PRESSURE: 58 MMHG
DOP CALC AO PEAK VEL: 1.61 M/S
DOP CALC AO VTI: 35.64 CM
DOP CALC LVOT AREA: 5 CM2
DOP CALC LVOT DIAMETER: 2.52 CM
DOP CALC LVOT PEAK VEL: 1.65 M/S
DOP CALC LVOT STROKE VOLUME: 131.11 CM3
DOP CALCLVOT PEAK VEL VTI: 26.3 CM
E WAVE DECELERATION TIME: 233.57 MSEC
E/A RATIO: 0.93
E/E' RATIO: 8.88 M/S
ECHO LV POSTERIOR WALL: 1.16 CM (ref 0.6–1.1)
EJECTION FRACTION: 60 %
FRACTIONAL SHORTENING: 44 % (ref 28–44)
INTERVENTRICULAR SEPTUM: 1.33 CM (ref 0.6–1.1)
IVRT: 94.58 MSEC
LA MAJOR: 6.34 CM
LA MINOR: 5.99 CM
LA WIDTH: 4.07 CM
LEFT ATRIUM SIZE: 3.73 CM
LEFT ATRIUM VOLUME INDEX: 35.6 ML/M2
LEFT ATRIUM VOLUME: 79.49 CM3
LEFT INTERNAL DIMENSION IN SYSTOLE: 2.55 CM (ref 2.1–4)
LEFT VENTRICLE DIASTOLIC VOLUME INDEX: 42.09 ML/M2
LEFT VENTRICLE DIASTOLIC VOLUME: 93.87 ML
LEFT VENTRICLE MASS INDEX: 95 G/M2
LEFT VENTRICLE SYSTOLIC VOLUME INDEX: 10.5 ML/M2
LEFT VENTRICLE SYSTOLIC VOLUME: 23.51 ML
LEFT VENTRICULAR INTERNAL DIMENSION IN DIASTOLE: 4.53 CM (ref 3.5–6)
LEFT VENTRICULAR MASS: 211.09 G
LV LATERAL E/E' RATIO: 7.89 M/S
LV SEPTAL E/E' RATIO: 10.14 M/S
MV PEAK A VEL: 0.76 M/S
MV PEAK E VEL: 0.71 M/S
NUC STRESS DIASTOLIC VOLUME INDEX: 79
NUC STRESS EJECTION FRACTION: 74 %
NUC STRESS SYSTOLIC VOLUME INDEX: 20
OHS CV CPX 85 PERCENT MAX PREDICTED HEART RATE MALE: 130
OHS CV CPX MAX PREDICTED HEART RATE: 153
OHS CV CPX PATIENT IS FEMALE: 0
OHS CV CPX PATIENT IS MALE: 1
OHS CV CPX PEAK DIASTOLIC BLOOD PRESSURE: 53 MMHG
OHS CV CPX PEAK HEAR RATE: 80 BPM
OHS CV CPX PEAK RATE PRESSURE PRODUCT: 7600
OHS CV CPX PEAK SYSTOLIC BLOOD PRESSURE: 95 MMHG
OHS CV CPX PERCENT MAX PREDICTED HEART RATE ACHIEVED: 52
OHS CV CPX RATE PRESSURE PRODUCT PRESENTING: 6210
PISA TR MAX VEL: 2.55 M/S
PULM VEIN S/D RATIO: 1.57
PV PEAK D VEL: 0.42 M/S
PV PEAK S VEL: 0.66 M/S
PV PEAK VELOCITY: 1.18 CM/S
RA MAJOR: 6.37 CM
RA PRESSURE: 3 MMHG
RA WIDTH: 4.09 CM
RIGHT VENTRICULAR END-DIASTOLIC DIMENSION: 3.56 CM
RV TISSUE DOPPLER FREE WALL SYSTOLIC VELOCITY 1 (APICAL 4 CHAMBER VIEW): 11.85 CM/S
SINUS: 3.68 CM
STJ: 2.57 CM
SYSTOLIC BLOOD PRESSURE: 90 MMHG
TDI LATERAL: 0.09 M/S
TDI SEPTAL: 0.07 M/S
TDI: 0.08 M/S
TR MAX PG: 26 MMHG
TRICUSPID ANNULAR PLANE SYSTOLIC EXCURSION: 2.54 CM
TV REST PULMONARY ARTERY PRESSURE: 29 MMHG

## 2021-10-27 PROCEDURE — 93018 CV STRESS TEST I&R ONLY: CPT | Mod: ,,, | Performed by: INTERNAL MEDICINE

## 2021-10-27 PROCEDURE — A9502 TC99M TETROFOSMIN: HCPCS

## 2021-10-27 PROCEDURE — 78452 STRESS TEST WITH MYOCARDIAL PERFUSION (CUPID ONLY): ICD-10-PCS | Mod: 26,,, | Performed by: INTERNAL MEDICINE

## 2021-10-27 PROCEDURE — 93016 STRESS TEST WITH MYOCARDIAL PERFUSION (CUPID ONLY): ICD-10-PCS | Mod: ,,, | Performed by: INTERNAL MEDICINE

## 2021-10-27 PROCEDURE — 93306 TTE W/DOPPLER COMPLETE: CPT

## 2021-10-27 PROCEDURE — 78452 HT MUSCLE IMAGE SPECT MULT: CPT | Mod: 26,,, | Performed by: INTERNAL MEDICINE

## 2021-10-27 PROCEDURE — 93306 ECHO (CUPID ONLY): ICD-10-PCS | Mod: 26,,, | Performed by: INTERNAL MEDICINE

## 2021-10-27 PROCEDURE — 93017 CV STRESS TEST TRACING ONLY: CPT

## 2021-10-27 PROCEDURE — 78452 HT MUSCLE IMAGE SPECT MULT: CPT

## 2021-10-27 PROCEDURE — 93016 CV STRESS TEST SUPVJ ONLY: CPT | Mod: ,,, | Performed by: INTERNAL MEDICINE

## 2021-10-27 PROCEDURE — 93018 STRESS TEST WITH MYOCARDIAL PERFUSION (CUPID ONLY): ICD-10-PCS | Mod: ,,, | Performed by: INTERNAL MEDICINE

## 2021-10-27 PROCEDURE — 63600175 PHARM REV CODE 636 W HCPCS: Performed by: INTERNAL MEDICINE

## 2021-10-27 PROCEDURE — 93306 TTE W/DOPPLER COMPLETE: CPT | Mod: 26,,, | Performed by: INTERNAL MEDICINE

## 2021-10-27 RX ORDER — REGADENOSON 0.08 MG/ML
0.4 INJECTION, SOLUTION INTRAVENOUS ONCE
Status: COMPLETED | OUTPATIENT
Start: 2021-10-27 | End: 2021-10-27

## 2021-10-27 RX ADMIN — REGADENOSON 0.4 MG: 0.08 INJECTION, SOLUTION INTRAVENOUS at 08:10

## 2021-11-26 ENCOUNTER — PES CALL (OUTPATIENT)
Dept: ADMINISTRATIVE | Facility: CLINIC | Age: 66
End: 2021-11-26
Payer: MEDICARE

## 2021-12-23 DIAGNOSIS — N40.0 BPH WITHOUT OBSTRUCTION/LOWER URINARY TRACT SYMPTOMS: ICD-10-CM

## 2021-12-23 RX ORDER — TAMSULOSIN HYDROCHLORIDE 0.4 MG/1
1 CAPSULE ORAL DAILY
Qty: 90 CAPSULE | Refills: 3 | Status: SHIPPED | OUTPATIENT
Start: 2021-12-23 | End: 2022-09-13 | Stop reason: SDUPTHER

## 2021-12-29 RX ORDER — ISOSORBIDE MONONITRATE 30 MG/1
30 TABLET, EXTENDED RELEASE ORAL DAILY
Qty: 90 TABLET | Refills: 3 | Status: SHIPPED | OUTPATIENT
Start: 2021-12-29 | End: 2022-12-09

## 2021-12-29 RX ORDER — METOPROLOL SUCCINATE 25 MG/1
25 TABLET, EXTENDED RELEASE ORAL DAILY
Qty: 90 TABLET | Refills: 3 | Status: SHIPPED | OUTPATIENT
Start: 2021-12-29 | End: 2022-12-09

## 2021-12-30 ENCOUNTER — PES CALL (OUTPATIENT)
Dept: ADMINISTRATIVE | Facility: CLINIC | Age: 66
End: 2021-12-30
Payer: MEDICARE

## 2022-01-04 ENCOUNTER — PES CALL (OUTPATIENT)
Dept: ADMINISTRATIVE | Facility: CLINIC | Age: 67
End: 2022-01-04
Payer: MEDICARE

## 2022-01-06 ENCOUNTER — PES CALL (OUTPATIENT)
Dept: ADMINISTRATIVE | Facility: CLINIC | Age: 67
End: 2022-01-06
Payer: MEDICARE

## 2022-01-13 ENCOUNTER — OFFICE VISIT (OUTPATIENT)
Dept: FAMILY MEDICINE | Facility: CLINIC | Age: 67
End: 2022-01-13
Payer: MEDICARE

## 2022-01-13 VITALS
HEIGHT: 72 IN | SYSTOLIC BLOOD PRESSURE: 108 MMHG | OXYGEN SATURATION: 95 % | TEMPERATURE: 98 F | DIASTOLIC BLOOD PRESSURE: 56 MMHG | WEIGHT: 235.13 LBS | HEART RATE: 70 BPM | BODY MASS INDEX: 31.85 KG/M2

## 2022-01-13 DIAGNOSIS — E78.5 HYPERLIPIDEMIA, UNSPECIFIED HYPERLIPIDEMIA TYPE: ICD-10-CM

## 2022-01-13 DIAGNOSIS — I25.10 CORONARY ARTERY DISEASE INVOLVING NATIVE CORONARY ARTERY OF NATIVE HEART, UNSPECIFIED WHETHER ANGINA PRESENT: ICD-10-CM

## 2022-01-13 DIAGNOSIS — E11.40 TYPE 2 DIABETES MELLITUS WITH DIABETIC NEUROPATHY, WITHOUT LONG-TERM CURRENT USE OF INSULIN: ICD-10-CM

## 2022-01-13 DIAGNOSIS — E11.9 TYPE 2 DIABETES MELLITUS WITHOUT RETINOPATHY: ICD-10-CM

## 2022-01-13 DIAGNOSIS — I10 PRIMARY HYPERTENSION: ICD-10-CM

## 2022-01-13 DIAGNOSIS — Z00.00 ENCOUNTER FOR PREVENTIVE HEALTH EXAMINATION: Primary | ICD-10-CM

## 2022-01-13 DIAGNOSIS — E66.09 CLASS 1 OBESITY DUE TO EXCESS CALORIES WITH SERIOUS COMORBIDITY AND BODY MASS INDEX (BMI) OF 31.0 TO 31.9 IN ADULT: ICD-10-CM

## 2022-01-13 PROBLEM — E66.811 CLASS 1 OBESITY DUE TO EXCESS CALORIES WITH SERIOUS COMORBIDITY IN ADULT: Status: ACTIVE | Noted: 2022-01-13

## 2022-01-13 PROCEDURE — 3072F LOW RISK FOR RETINOPATHY: CPT | Mod: CPTII,S$GLB,, | Performed by: NURSE PRACTITIONER

## 2022-01-13 PROCEDURE — 99999 PR PBB SHADOW E&M-EST. PATIENT-LVL V: ICD-10-PCS | Mod: PBBFAC,,, | Performed by: NURSE PRACTITIONER

## 2022-01-13 PROCEDURE — 3078F PR MOST RECENT DIASTOLIC BLOOD PRESSURE < 80 MM HG: ICD-10-PCS | Mod: CPTII,S$GLB,, | Performed by: NURSE PRACTITIONER

## 2022-01-13 PROCEDURE — G0439 PPPS, SUBSEQ VISIT: HCPCS | Mod: S$GLB,,, | Performed by: NURSE PRACTITIONER

## 2022-01-13 PROCEDURE — 1101F PR PT FALLS ASSESS DOC 0-1 FALLS W/OUT INJ PAST YR: ICD-10-PCS | Mod: CPTII,S$GLB,, | Performed by: NURSE PRACTITIONER

## 2022-01-13 PROCEDURE — 3008F BODY MASS INDEX DOCD: CPT | Mod: CPTII,S$GLB,, | Performed by: NURSE PRACTITIONER

## 2022-01-13 PROCEDURE — 1126F AMNT PAIN NOTED NONE PRSNT: CPT | Mod: CPTII,S$GLB,, | Performed by: NURSE PRACTITIONER

## 2022-01-13 PROCEDURE — 3288F FALL RISK ASSESSMENT DOCD: CPT | Mod: CPTII,S$GLB,, | Performed by: NURSE PRACTITIONER

## 2022-01-13 PROCEDURE — 3078F DIAST BP <80 MM HG: CPT | Mod: CPTII,S$GLB,, | Performed by: NURSE PRACTITIONER

## 2022-01-13 PROCEDURE — 3288F PR FALLS RISK ASSESSMENT DOCUMENTED: ICD-10-PCS | Mod: CPTII,S$GLB,, | Performed by: NURSE PRACTITIONER

## 2022-01-13 PROCEDURE — 99499 RISK ADDL DX/OHS AUDIT: ICD-10-PCS | Mod: S$GLB,,, | Performed by: NURSE PRACTITIONER

## 2022-01-13 PROCEDURE — 1170F FXNL STATUS ASSESSED: CPT | Mod: CPTII,S$GLB,, | Performed by: NURSE PRACTITIONER

## 2022-01-13 PROCEDURE — 3008F PR BODY MASS INDEX (BMI) DOCUMENTED: ICD-10-PCS | Mod: CPTII,S$GLB,, | Performed by: NURSE PRACTITIONER

## 2022-01-13 PROCEDURE — 1160F RVW MEDS BY RX/DR IN RCRD: CPT | Mod: CPTII,S$GLB,, | Performed by: NURSE PRACTITIONER

## 2022-01-13 PROCEDURE — 3074F PR MOST RECENT SYSTOLIC BLOOD PRESSURE < 130 MM HG: ICD-10-PCS | Mod: CPTII,S$GLB,, | Performed by: NURSE PRACTITIONER

## 2022-01-13 PROCEDURE — 1159F MED LIST DOCD IN RCRD: CPT | Mod: CPTII,S$GLB,, | Performed by: NURSE PRACTITIONER

## 2022-01-13 PROCEDURE — 99499 UNLISTED E&M SERVICE: CPT | Mod: S$GLB,,, | Performed by: NURSE PRACTITIONER

## 2022-01-13 PROCEDURE — 1101F PT FALLS ASSESS-DOCD LE1/YR: CPT | Mod: CPTII,S$GLB,, | Performed by: NURSE PRACTITIONER

## 2022-01-13 PROCEDURE — 1160F PR REVIEW ALL MEDS BY PRESCRIBER/CLIN PHARMACIST DOCUMENTED: ICD-10-PCS | Mod: CPTII,S$GLB,, | Performed by: NURSE PRACTITIONER

## 2022-01-13 PROCEDURE — 1126F PR PAIN SEVERITY QUANTIFIED, NO PAIN PRESENT: ICD-10-PCS | Mod: CPTII,S$GLB,, | Performed by: NURSE PRACTITIONER

## 2022-01-13 PROCEDURE — 1170F PR FUNCTIONAL STATUS ASSESSED: ICD-10-PCS | Mod: CPTII,S$GLB,, | Performed by: NURSE PRACTITIONER

## 2022-01-13 PROCEDURE — 99999 PR PBB SHADOW E&M-EST. PATIENT-LVL V: CPT | Mod: PBBFAC,,, | Performed by: NURSE PRACTITIONER

## 2022-01-13 PROCEDURE — 1159F PR MEDICATION LIST DOCUMENTED IN MEDICAL RECORD: ICD-10-PCS | Mod: CPTII,S$GLB,, | Performed by: NURSE PRACTITIONER

## 2022-01-13 PROCEDURE — G0439 PR MEDICARE ANNUAL WELLNESS SUBSEQUENT VISIT: ICD-10-PCS | Mod: S$GLB,,, | Performed by: NURSE PRACTITIONER

## 2022-01-13 PROCEDURE — 3074F SYST BP LT 130 MM HG: CPT | Mod: CPTII,S$GLB,, | Performed by: NURSE PRACTITIONER

## 2022-01-13 PROCEDURE — 3072F PR LOW RISK FOR RETINOPATHY: ICD-10-PCS | Mod: CPTII,S$GLB,, | Performed by: NURSE PRACTITIONER

## 2022-01-13 RX ORDER — EMPAGLIFLOZIN 10 MG/1
10 TABLET, FILM COATED ORAL DAILY
COMMUNITY
Start: 2021-12-30

## 2022-01-13 NOTE — PROGRESS NOTES
Edel Edwards presented for a  Medicare AWV and comprehensive Health Risk Assessment today. The following components were reviewed and updated:    · Medical history  · Family History  · Social history  · Allergies and Current Medications  · Health Risk Assessment  · Health Maintenance  · Care Team       ** See Completed Assessments for Annual Wellness Visit within the encounter summary.**       The following assessments were completed:  · Living Situation  · CAGE  · Depression Screening  · Timed Get Up and Go  · Whisper Test  · Cognitive Function Screening  · Nutrition Screening  · ADL Screening  · PAQ Screening          Vitals:    01/13/22 1303 01/13/22 1315   BP:  (!) 108/56   Pulse:  70   Temp:  98 °F (36.7 °C)   TempSrc:  Oral   SpO2:  95%   Weight: 106.7 kg (235 lb 1.9 oz)    Height: 6' (1.829 m)      Body mass index is 31.89 kg/m².  Physical Exam  Vitals and nursing note reviewed.   Constitutional:       Appearance: Normal appearance.   Cardiovascular:      Rate and Rhythm: Normal rate.      Pulses: Normal pulses.      Heart sounds: Normal heart sounds.   Pulmonary:      Effort: Pulmonary effort is normal.      Breath sounds: Normal breath sounds.   Abdominal:      General: Bowel sounds are normal.      Palpations: Abdomen is soft.   Musculoskeletal:         General: Normal range of motion.   Skin:     General: Skin is warm and dry.   Neurological:      Mental Status: He is alert and oriented to person, place, and time.   Psychiatric:         Mood and Affect: Mood normal.         Behavior: Behavior normal.             Diagnoses and health risks identified today and associated recommendations/orders:    1. Encounter for preventive health examination  Pt was seen today for an Annual Wellness visit. Healthcare maintenance and screening recommendations were discussed and updated as indicated. Return in one year for AWV.    Review current opioid prescriptions: n/a  Screen for potential Substance Use Disorders:  n/a    2. Type 2 diabetes mellitus without retinopathy - Both Eyes  Hgb A1C 5.8 on 4/19/21. Current medical regimen effective, continue current treatment plan. Followed by Endocrine, PCP, Optometry.    3. Type 2 diabetes mellitus with diabetic neuropathy, without long-term current use of insulin  Current medical regimen effective, continue current treatment plan. Followed by Endocrine, PCP.    4. Class 1 obesity due to excess calories with serious comorbidity and body mass index (BMI) of 31.0 to 31.9 in adult  The patient is asked to make an attempt to improve diet and exercise patterns to aid in medical management of this problem.    5. Primary hypertension  At goal. Current medical regimen effective, continue current treatment plan.     6. Hyperlipidemia, unspecified hyperlipidemia type  Current medical regimen effective, continue current treatment plan.    7. Coronary artery disease involving native coronary artery of native heart, unspecified whether angina present  Current medical regimen effective, continue current treatment plan. Followed by Cardiology.        Provided Edel with a 5-10 year written screening schedule and personal prevention plan. Recommendations were developed using the USPSTF age appropriate recommendations. Education, counseling, and referrals were provided as needed. After Visit Summary printed and given to patient which includes a list of additional screenings\tests needed.    Follow up in about 11 days (around 1/24/2022) with provider.    YI Will  I offered to discuss advanced care planning, including how to pick a person who would make decisions for you if you were unable to make them for yourself, called a health care power of , and what kind of decisions you might make such as use of life sustaining treatments such as ventilators and tube feeding when faced with a life limiting illness recorded on a living will that they will need to know. (How you want to be  cared for as you near the end of your natural life)     X Patient is interested in learning more about how to make advanced directives.  I provided them paperwork and offered to discuss this with them.

## 2022-01-13 NOTE — PATIENT INSTRUCTIONS
Counseling and Referral of Other Preventative  (Italic type indicates deductible and co-insurance are waived)    Patient Name: Edel Edwards  Today's Date: 1/13/2022    Health Maintenance       Date Due Completion Date    Diabetes Urine Screening 06/16/2016 6/16/2015    Foot Exam 08/02/2017 8/2/2016 (Done)    Override on 8/2/2016: Done    Abdominal Aortic Aneurysm Screening Never done ---    Lipid Panel 05/06/2021 5/6/2020    Override on 8/2/2016: Done (lipidtcc 144tg 72hdl 48hdl ldl82)    Hemoglobin A1c 02/24/2022 8/24/2021    Override on 8/2/2016: Done (6.2 [facility])    Override on 4/26/2016: Done (6.2 dr wise)    Eye Exam 10/04/2022 10/4/2021    High Dose Statin 01/13/2023 1/13/2022    Aspirin/Antiplatelet Therapy 01/13/2023 1/13/2022    Pneumococcal Vaccines (Age 65+) (2 of 2 - PPSV23) 03/13/2025 3/13/2020    Colorectal Cancer Screening 04/21/2026 4/21/2021    TETANUS VACCINE 06/23/2027 6/23/2017        No orders of the defined types were placed in this encounter.    The following information is provided to all patients.  This information is to help you find resources for any of the problems found today that may be affecting your health:                Living healthy guide: www.[facility].[place].gov      Understanding Diabetes: www.diabetes.org      Eating healthy: www.cdc.gov/healthyweight      CDC home safety checklist: www.cdc.gov/steadi/patient.html      Agency on Aging: www.goea.[place].[facility]      Alcoholics anonymous (AA): www.aa.org      Physical Activity: www.lia.nih.gov/sg2dvrr      Tobacco use: www.quitwithusla.org

## 2022-01-24 ENCOUNTER — OFFICE VISIT (OUTPATIENT)
Dept: FAMILY MEDICINE | Facility: CLINIC | Age: 67
End: 2022-01-24
Payer: MEDICARE

## 2022-01-24 VITALS
HEART RATE: 72 BPM | BODY MASS INDEX: 32.07 KG/M2 | TEMPERATURE: 98 F | WEIGHT: 236.75 LBS | RESPIRATION RATE: 16 BRPM | SYSTOLIC BLOOD PRESSURE: 120 MMHG | HEIGHT: 72 IN | OXYGEN SATURATION: 98 % | DIASTOLIC BLOOD PRESSURE: 60 MMHG

## 2022-01-24 DIAGNOSIS — I25.10 CORONARY ARTERY DISEASE INVOLVING NATIVE CORONARY ARTERY OF NATIVE HEART, UNSPECIFIED WHETHER ANGINA PRESENT: ICD-10-CM

## 2022-01-24 DIAGNOSIS — H25.13 NUCLEAR SCLEROSIS OF BOTH EYES: ICD-10-CM

## 2022-01-24 DIAGNOSIS — E78.5 HYPERLIPIDEMIA, UNSPECIFIED HYPERLIPIDEMIA TYPE: ICD-10-CM

## 2022-01-24 DIAGNOSIS — D22.9 BENIGN MOLE: ICD-10-CM

## 2022-01-24 DIAGNOSIS — Z00.00 ANNUAL PHYSICAL EXAM: Primary | ICD-10-CM

## 2022-01-24 DIAGNOSIS — E66.9 OBESITY (BMI 30-39.9): ICD-10-CM

## 2022-01-24 DIAGNOSIS — E11.40 TYPE 2 DIABETES MELLITUS WITH DIABETIC NEUROPATHY, WITHOUT LONG-TERM CURRENT USE OF INSULIN: ICD-10-CM

## 2022-01-24 DIAGNOSIS — Z13.6 ENCOUNTER FOR ABDOMINAL AORTIC ANEURYSM (AAA) SCREENING: ICD-10-CM

## 2022-01-24 DIAGNOSIS — I10 PRIMARY HYPERTENSION: ICD-10-CM

## 2022-01-24 DIAGNOSIS — N40.0 BENIGN PROSTATIC HYPERPLASIA WITHOUT LOWER URINARY TRACT SYMPTOMS: ICD-10-CM

## 2022-01-24 PROCEDURE — 1126F AMNT PAIN NOTED NONE PRSNT: CPT | Mod: CPTII,S$GLB,, | Performed by: FAMILY MEDICINE

## 2022-01-24 PROCEDURE — 99499 UNLISTED E&M SERVICE: CPT | Mod: S$GLB,,, | Performed by: FAMILY MEDICINE

## 2022-01-24 PROCEDURE — 1101F PT FALLS ASSESS-DOCD LE1/YR: CPT | Mod: CPTII,S$GLB,, | Performed by: FAMILY MEDICINE

## 2022-01-24 PROCEDURE — 3074F SYST BP LT 130 MM HG: CPT | Mod: CPTII,S$GLB,, | Performed by: FAMILY MEDICINE

## 2022-01-24 PROCEDURE — 99397 PR PREVENTIVE VISIT,EST,65 & OVER: ICD-10-PCS | Mod: S$GLB,,, | Performed by: FAMILY MEDICINE

## 2022-01-24 PROCEDURE — 1126F PR PAIN SEVERITY QUANTIFIED, NO PAIN PRESENT: ICD-10-PCS | Mod: CPTII,S$GLB,, | Performed by: FAMILY MEDICINE

## 2022-01-24 PROCEDURE — 3072F PR LOW RISK FOR RETINOPATHY: ICD-10-PCS | Mod: CPTII,S$GLB,, | Performed by: FAMILY MEDICINE

## 2022-01-24 PROCEDURE — 3008F BODY MASS INDEX DOCD: CPT | Mod: CPTII,S$GLB,, | Performed by: FAMILY MEDICINE

## 2022-01-24 PROCEDURE — 1159F MED LIST DOCD IN RCRD: CPT | Mod: CPTII,S$GLB,, | Performed by: FAMILY MEDICINE

## 2022-01-24 PROCEDURE — 1160F PR REVIEW ALL MEDS BY PRESCRIBER/CLIN PHARMACIST DOCUMENTED: ICD-10-PCS | Mod: CPTII,S$GLB,, | Performed by: FAMILY MEDICINE

## 2022-01-24 PROCEDURE — 3008F PR BODY MASS INDEX (BMI) DOCUMENTED: ICD-10-PCS | Mod: CPTII,S$GLB,, | Performed by: FAMILY MEDICINE

## 2022-01-24 PROCEDURE — 99499 RISK ADDL DX/OHS AUDIT: ICD-10-PCS | Mod: S$GLB,,, | Performed by: FAMILY MEDICINE

## 2022-01-24 PROCEDURE — 3078F DIAST BP <80 MM HG: CPT | Mod: CPTII,S$GLB,, | Performed by: FAMILY MEDICINE

## 2022-01-24 PROCEDURE — 99999 PR PBB SHADOW E&M-EST. PATIENT-LVL V: ICD-10-PCS | Mod: PBBFAC,,, | Performed by: FAMILY MEDICINE

## 2022-01-24 PROCEDURE — 3288F PR FALLS RISK ASSESSMENT DOCUMENTED: ICD-10-PCS | Mod: CPTII,S$GLB,, | Performed by: FAMILY MEDICINE

## 2022-01-24 PROCEDURE — 1159F PR MEDICATION LIST DOCUMENTED IN MEDICAL RECORD: ICD-10-PCS | Mod: CPTII,S$GLB,, | Performed by: FAMILY MEDICINE

## 2022-01-24 PROCEDURE — 1101F PR PT FALLS ASSESS DOC 0-1 FALLS W/OUT INJ PAST YR: ICD-10-PCS | Mod: CPTII,S$GLB,, | Performed by: FAMILY MEDICINE

## 2022-01-24 PROCEDURE — 1160F RVW MEDS BY RX/DR IN RCRD: CPT | Mod: CPTII,S$GLB,, | Performed by: FAMILY MEDICINE

## 2022-01-24 PROCEDURE — 99397 PER PM REEVAL EST PAT 65+ YR: CPT | Mod: S$GLB,,, | Performed by: FAMILY MEDICINE

## 2022-01-24 PROCEDURE — 3072F LOW RISK FOR RETINOPATHY: CPT | Mod: CPTII,S$GLB,, | Performed by: FAMILY MEDICINE

## 2022-01-24 PROCEDURE — 3078F PR MOST RECENT DIASTOLIC BLOOD PRESSURE < 80 MM HG: ICD-10-PCS | Mod: CPTII,S$GLB,, | Performed by: FAMILY MEDICINE

## 2022-01-24 PROCEDURE — 3288F FALL RISK ASSESSMENT DOCD: CPT | Mod: CPTII,S$GLB,, | Performed by: FAMILY MEDICINE

## 2022-01-24 PROCEDURE — 99999 PR PBB SHADOW E&M-EST. PATIENT-LVL V: CPT | Mod: PBBFAC,,, | Performed by: FAMILY MEDICINE

## 2022-01-24 PROCEDURE — 3074F PR MOST RECENT SYSTOLIC BLOOD PRESSURE < 130 MM HG: ICD-10-PCS | Mod: CPTII,S$GLB,, | Performed by: FAMILY MEDICINE

## 2022-01-24 RX ORDER — ZOSTER VACCINE RECOMBINANT, ADJUVANTED 50 MCG/0.5
KIT INTRAMUSCULAR
COMMUNITY
Start: 2021-12-29

## 2022-01-24 NOTE — PROGRESS NOTES
Physical Exam  /60   Pulse 72   Temp 98 °F (36.7 °C)   Resp 16   Ht 6' (1.829 m)   Wt 107.4 kg (236 lb 12.4 oz)   SpO2 98%   BMI 32.11 kg/m²      Office Visit    Patient Name: Edel Edwards III    : 1955  MRN: [id number]      Assessment/Plan:  Edel Edwards III is a 66 y.o. male who presents today for :    Annual physical exam  Encounter for abdominal aortic aneurysm (AAA) screening  -     US Abdominal Aorta; Future; Expected date: 2022  Obesity (BMI 30-39.9)  -recent labs done with Endocrine - [facility] requested today for records for further review.  -anticipatory guidance provided with age appropriate preventative services discussed, healthy diet and regular physical exercise also discussed with patient    Benign mole  -     Ambulatory referral/consult to Dermatology; Future; Expected date: 2022    Type 2 diabetes mellitus with diabetic neuropathy, without long-term current use of insulin  -controlled per patient from recent labs with Endocrine, [facility] requested today for records. Reviewed with patient about routine diabetic care.    Primary hypertension  Hyperlipidemia, unspecified hyperlipidemia type  Coronary artery disease involving native coronary artery of native heart, unspecified whether angina present  -continue current medication regimen  -DASH diet, regular cardiovascular exercises  -weight loss    Nuclear sclerosis of both eyes  -f/u ophthalmology as needed    Benign prostatic hyperplasia without lower urinary tract symptoms  -stable, continue Flomax and f/u with Urology as needed          Follow up 6mo with PCP    This note was created by combination of typed  and MModal dictation.  Transcription errors may be present.  If there are any questions, please contact me.        ----------------------------------------------------------------------------------------------------------------------      HPI:  Patient Care Team:  Osito Thacker MD as PCP - General  (Family Medicine)  Missy Cote LPN as Care Coordinator  Eulalio Eddy MD as Consulting Physician (Endocrinology)    Edel is a 66 y.o. male with      Patient Active Problem List   Diagnosis    Type 2 diabetes mellitus with neurologic complication, without long-term current use of insulin    HTN (hypertension)    Hyperlipidemia    Type 2 diabetes mellitus without retinopathy - Both Eyes    Nuclear sclerosis - Both Eyes    BMI 33.0-33.9,adult    Elevated PSA    Chest pain, atypical    CAD (coronary artery disease)    OLIVIER (dyspnea on exertion)    Refractive error    Wears contact lenses    Class 1 obesity due to excess calories with serious comorbidity in adult       This patient is new to me     Edel has PMHx as noted above and presents today for:  Annual Exam      He is doing well overall and has no major complaints today except for a mole in the mid back that has been itchy for over a year, he desires to get it removed. Health maintenance-wise, he is up to date on colon cancer screening, but is due for AAA ultrasound screening given his 30+ years of smoking history previously. He also had labs done recently with his Endocrinologist, which he states A1c is below 6%. His   BP is well controlled on current regimen. He denies any cardiovascular or neurologic complaints today. Otherwise, no other acute issues during this visit.      Additional ROS    CONST: no fever, no activity change, +weight gain  EYES: no vision change.   ENT: no sore throat. No dysphagia.   CV: no CP with exertion  RESP: no SOB  GI: no N/V/diarrhea/constipation  : no urinary concerns  MSK: no new myalgias or arthralgias.   SKIN: see HPI  NEURO: no focal deficits.   PSYCH: no new issues.   ENDOCRINE: no polyuria.             Current Medications  Medications reviewed and updated.       Current Outpatient Medications:     ACCU-CHEK SMARTVIEW TEST STRIP Strp, as needed. , Disp: , Rfl: 1    aspirin (ECOTRIN) 81 MG EC  tablet, Take 81 mg by mouth once daily., Disp: , Rfl:     atorvastatin (LIPITOR) 40 MG tablet, TAKE 1/2 TABLET BY MOUTH EVERY DAY, Disp: 45 tablet, Rfl: 3    azilsartan medoxomiL 80 mg Tab, Take by mouth once daily. , Disp: , Rfl:     clopidogreL (PLAVIX) 75 mg tablet, TAKE ONE TABLET BY MOUTH EVERY DAY, Disp: 90 tablet, Rfl: 3    clotrimazole-betamethasone 1-0.05% (LOTRISONE) cream, Apply topically as needed., Disp: , Rfl:     diphth,pertus,acell,,tetanus (BOOSTRIX) 2.5-8-5 Lf-mcg-Lf/0.5mL Syrg injection, Boostrix Tdap 2.5 Lf unit-8 mcg-5 Lf/0.5 mL intramuscular syringe  inject 0.5 milliliter intramuscularly, Disp: , Rfl:     etodolac (LODINE) 500 MG tablet, Take 1 tablet (500 mg total) by mouth 2 (two) times daily., Disp: 60 tablet, Rfl: 0    flu vac ts 2016-17,4 yr up,/PF (FLU VAC TS 2014-15,36MOS+,,PF,) 45 mcg (15 mcg x 3)/0.5 mL, Flucelvax 9402-7539 (PF) 45 mcg (15 mcg x 3)/0.5 mL IM syringe  inject 0.5 milliliter intramuscularly, Disp: , Rfl:     gabapentin (NEURONTIN) 800 MG tablet, Take 0.5 tablets (400 mg total) by mouth once daily., Disp: 90 tablet, Rfl: 3    influenza (FLULAVAL QUADRIVALENT) 60 mcg (15 mcg x 4)/0.5 mL Syrg vaccine, Fluarix Quad [id number] (PF) 60 mcg (15 mcg x 4)/0.5 mL IM syringe  inject 0.5 milliliter intramuscularly, Disp: , Rfl:     isosorbide mononitrate (IMDUR) 30 MG 24 hr tablet, Take 1 tablet (30 mg total) by mouth once daily., Disp: 90 tablet, Rfl: 3    JARDIANCE 10 mg tablet, Take 10 mg by mouth once daily., Disp: , Rfl:     metformin (GLUCOPHAGE) 1000 MG tablet, Take 1,000 mg by mouth 2 (two) times daily with meals., Disp: , Rfl:     metoprolol succinate (TOPROL-XL) 25 MG 24 hr tablet, Take 1 tablet (25 mg total) by mouth once daily., Disp: 90 tablet, Rfl: 3    OZEMPIC 1 mg/dose (2 mg/1.5 mL) PnIj, INJECT 1 MG WEEKLY, Disp: , Rfl:     SHINGRIX, PF, 50 mcg/0.5 mL injection, , Disp: , Rfl:     tamsulosin (FLOMAX) 0.4 mg Cap, Take 1 capsule (0.4 mg total) by mouth  once daily., Disp: 90 capsule, Rfl: 3    terazosin (HYTRIN) 2 MG capsule, Take 2 mg by mouth every evening. , Disp: , Rfl:     zoster vaccine live, PF, (ZOSTAVAX) 19,400 unit/0.65 mL injection, Zostavax (PF) 19,400 unit/0.65 mL subcutaneous suspension  inject contents of 1 vial subcutaneously, Disp: , Rfl:     ketoconazole (NIZORAL) 2 % cream, Apply topically 2 (two) times daily. (Patient not taking: No sig reported), Disp: 60 g, Rfl: 0    methylPREDNISolone (MEDROL) 4 MG Tab, methylprednisolone 4 mg tablet  Take 1 tablet every day by oral route with meals., Disp: , Rfl:     mometasone 0.1% (ELOCON) 0.1 % cream, Apply topically once daily. (Patient not taking: No sig reported), Disp: 45 g, Rfl: 0    Past Surgical History:   Procedure Laterality Date    coronary stents      JOINT REPLACEMENT Left 2015    LEFT HEART CATHETERIZATION Left 2018    Procedure: Left heart cath;  Surgeon: Teja Eaton MD;  [facility] CATH LAB;  Service: Cardiology;  Laterality: Left;  RN PREOP 2018    NONE         Family History   Problem Relation Age of Onset    Hypertension Mother     Stroke Father     No Known Problems Sister     No Known Problems Sister     No Known Problems Brother     Down syndrome Daughter     Heart block Daughter     Cancer Son     Amblyopia Neg Hx     Blindness Neg Hx     Cataracts Neg Hx     Glaucoma Neg Hx     Macular degeneration Neg Hx     Retinal detachment Neg Hx     Strabismus Neg Hx     Thyroid disease Neg Hx        Social History     Socioeconomic History    Marital status:    Tobacco Use    Smoking status: Former Smoker     Quit date: 2009     Years since quittin.6    Smokeless tobacco: Never Used    Tobacco comment: quit    Substance and Sexual Activity    Alcohol use: No    Drug use: No    Sexual activity: Yes     Partners: Female     Social Determinants of Health     Financial Resource Strain: Low Risk     Difficulty of Paying  Living Expenses: Not hard at all   Food Insecurity: No Food Insecurity    Worried About Running Out of Food in the Last Year: Never true    Ran Out of Food in the Last Year: Never true   Transportation Needs: No Transportation Needs    Lack of Transportation (Medical): No    Lack of Transportation (Non-Medical): No   Physical Activity: Insufficiently Active    Days of Exercise per Week: 3 days    Minutes of Exercise per Session: 30 min   Stress: Stress Concern Present    Feeling of Stress : To some extent   Social Connections: Unknown    Frequency of Communication with Friends and Family: More than three times a week    Frequency of Social Gatherings with Friends and Family: Three times a week    Active Member of Clubs or Organizations: Yes    Attends Club or Organization Meetings: More than 4 times per year    Marital Status:            Allergies   Review of patient's allergies indicates:  No Known Allergies          Review of Systems  See HPI      [unfilled]  /60   Pulse 72   Temp 98 °F (36.7 °C)   Resp 16   Ht 6' (1.829 m)   Wt 107.4 kg (236 lb 12.4 oz)   SpO2 98%   BMI 32.11 kg/m²     GEN: NAD, well developed, pleasant, well nourished  HEENT: NCAT, PERRLA, EOMI, sclera clear, anicteric, bilateral ear exam wnl, O/P clear, MMM with no lesions  NECK: normal, supple with midline trachea, no LAD, no thyromegaly  LUNGS: CTAB, no w/r/r, no increased work of breathing   HEART: RRR, normal S1 and S2, no m/r/g, no edema  ABD: s/nt/nd, NABS  SKIN: normal turgor, benign appearing mole over the L midback with presence of small scab, otherwise no rashes  PSYCH: AOx3, appropriate mood and affect  MSK: warm/well perfused, normal ROM in all extremities, no c/c/e.  NEURO: normal without focal findings, CN II-XII are grossly intact.    FOOT:  Protective Sensation (w/ 10 gram monofilament):  Right: Intact  Left: Intact    Visual Inspection:  Normal -  Bilateral    Pedal Pulses:   Right:  Present  Left: Present    Posterior tibialis:   Right:Present  Left: Present

## 2022-01-26 RX ORDER — CLOPIDOGREL BISULFATE 75 MG/1
75 TABLET ORAL DAILY
Qty: 90 TABLET | Refills: 3 | Status: CANCELLED | OUTPATIENT
Start: 2022-01-26

## 2022-01-26 RX ORDER — CLOPIDOGREL BISULFATE 75 MG/1
75 TABLET ORAL DAILY
Qty: 90 TABLET | Refills: 3 | Status: SHIPPED | OUTPATIENT
Start: 2022-01-26 | End: 2023-01-09 | Stop reason: SDUPTHER

## 2022-01-26 NOTE — TELEPHONE ENCOUNTER
----- Message from Amber Cesar sent at 1/19/2022  8:13 AM CST -----  Regarding: [place]/ [facility]/  [phone]  Type: RX Refill Request    Who Called:  [facility]    Refill or New Rx:  Refill    RX Name and Strength:  clopidogreL (PLAVIX) 75 mg tablet    Preferred Pharmacy with phone number:  [id number] - [place], [place] - [address]    Local or Mail Order:  Mail Order    Ordering Provider:  Angelito    Would the patient rather a call back or a response via [facility]?  Call back    Best Call Back Number:  [phone]

## 2022-01-26 NOTE — TELEPHONE ENCOUNTER
----- Message from Myah Artis sent at 1/21/2022  8:41 AM CST -----  Type: RX Refill Request    Who Called: Tawana from [facility]    Have you contacted your pharmacy: yes     Refill or New Rx: refill     RX Name and Strength:clopidogreL (PLAVIX) 75 mg tablet    Preferred Pharmacy with phone number:   [id number] - [place], [place] - [address]  [address]  [facility] [address]  Phone: [phone] Fax: [phone]    Local or Mail Order: mail     Would the patient rather a call back or a response via [facility]? Call back     Best Call Back Number: prompt for prescribes [phone]    fax: [phone]

## 2022-02-03 ENCOUNTER — HOSPITAL ENCOUNTER (OUTPATIENT)
Dept: RADIOLOGY | Facility: HOSPITAL | Age: 67
Discharge: HOME | End: 2022-02-03
Attending: FAMILY MEDICINE
Payer: MEDICARE

## 2022-02-03 DIAGNOSIS — Z13.6 ENCOUNTER FOR ABDOMINAL AORTIC ANEURYSM (AAA) SCREENING: ICD-10-CM

## 2022-02-03 PROCEDURE — 76775 US ABDOMINAL AORTA: ICD-10-PCS | Mod: 26,,, | Performed by: RADIOLOGY

## 2022-02-03 PROCEDURE — 76775 US EXAM ABDO BACK WALL LIM: CPT | Mod: 26,,, | Performed by: RADIOLOGY

## 2022-02-03 PROCEDURE — 76775 US EXAM ABDO BACK WALL LIM: CPT | Mod: TC

## 2022-06-19 ENCOUNTER — TELEPHONE (OUTPATIENT)
Dept: CARDIOLOGY | Facility: CLINIC | Age: 67
End: 2022-06-19
Payer: MEDICARE

## 2022-06-19 RX ORDER — TERAZOSIN 2 MG/1
2 CAPSULE ORAL NIGHTLY
Qty: 90 CAPSULE | Refills: 3 | Status: SHIPPED | OUTPATIENT
Start: 2022-06-19 | End: 2022-09-13

## 2022-08-02 ENCOUNTER — OFFICE VISIT (OUTPATIENT)
Dept: CARDIOLOGY | Facility: CLINIC | Age: 67
End: 2022-08-02
Payer: MEDICARE

## 2022-08-02 VITALS
HEIGHT: 72 IN | BODY MASS INDEX: 31.32 KG/M2 | OXYGEN SATURATION: 96 % | DIASTOLIC BLOOD PRESSURE: 69 MMHG | WEIGHT: 231.25 LBS | HEART RATE: 73 BPM | SYSTOLIC BLOOD PRESSURE: 116 MMHG | RESPIRATION RATE: 18 BRPM

## 2022-08-02 DIAGNOSIS — R07.89 CHEST PAIN, ATYPICAL: ICD-10-CM

## 2022-08-02 DIAGNOSIS — I10 PRIMARY HYPERTENSION: ICD-10-CM

## 2022-08-02 DIAGNOSIS — I10 ESSENTIAL HYPERTENSION: Primary | ICD-10-CM

## 2022-08-02 DIAGNOSIS — I25.10 CORONARY ARTERY DISEASE INVOLVING NATIVE CORONARY ARTERY OF NATIVE HEART, UNSPECIFIED WHETHER ANGINA PRESENT: ICD-10-CM

## 2022-08-02 DIAGNOSIS — E78.5 HYPERLIPIDEMIA, UNSPECIFIED HYPERLIPIDEMIA TYPE: ICD-10-CM

## 2022-08-02 DIAGNOSIS — R06.09 DOE (DYSPNEA ON EXERTION): ICD-10-CM

## 2022-08-02 PROCEDURE — 3078F DIAST BP <80 MM HG: CPT | Mod: CPTII,S$GLB,, | Performed by: INTERNAL MEDICINE

## 2022-08-02 PROCEDURE — 3078F PR MOST RECENT DIASTOLIC BLOOD PRESSURE < 80 MM HG: ICD-10-PCS | Mod: CPTII,S$GLB,, | Performed by: INTERNAL MEDICINE

## 2022-08-02 PROCEDURE — 3074F SYST BP LT 130 MM HG: CPT | Mod: CPTII,S$GLB,, | Performed by: INTERNAL MEDICINE

## 2022-08-02 PROCEDURE — 3288F FALL RISK ASSESSMENT DOCD: CPT | Mod: CPTII,S$GLB,, | Performed by: INTERNAL MEDICINE

## 2022-08-02 PROCEDURE — 99499 UNLISTED E&M SERVICE: CPT | Mod: S$GLB,,, | Performed by: INTERNAL MEDICINE

## 2022-08-02 PROCEDURE — 3072F PR LOW RISK FOR RETINOPATHY: ICD-10-PCS | Mod: CPTII,S$GLB,, | Performed by: INTERNAL MEDICINE

## 2022-08-02 PROCEDURE — 3072F LOW RISK FOR RETINOPATHY: CPT | Mod: CPTII,S$GLB,, | Performed by: INTERNAL MEDICINE

## 2022-08-02 PROCEDURE — 99999 PR PBB SHADOW E&M-EST. PATIENT-LVL V: ICD-10-PCS | Mod: PBBFAC,,, | Performed by: INTERNAL MEDICINE

## 2022-08-02 PROCEDURE — 3288F PR FALLS RISK ASSESSMENT DOCUMENTED: ICD-10-PCS | Mod: CPTII,S$GLB,, | Performed by: INTERNAL MEDICINE

## 2022-08-02 PROCEDURE — 1126F AMNT PAIN NOTED NONE PRSNT: CPT | Mod: CPTII,S$GLB,, | Performed by: INTERNAL MEDICINE

## 2022-08-02 PROCEDURE — 99499 RISK ADDL DX/OHS AUDIT: ICD-10-PCS | Mod: S$GLB,,, | Performed by: INTERNAL MEDICINE

## 2022-08-02 PROCEDURE — 3008F PR BODY MASS INDEX (BMI) DOCUMENTED: ICD-10-PCS | Mod: CPTII,S$GLB,, | Performed by: INTERNAL MEDICINE

## 2022-08-02 PROCEDURE — 99999 PR PBB SHADOW E&M-EST. PATIENT-LVL V: CPT | Mod: PBBFAC,,, | Performed by: INTERNAL MEDICINE

## 2022-08-02 PROCEDURE — 1101F PT FALLS ASSESS-DOCD LE1/YR: CPT | Mod: CPTII,S$GLB,, | Performed by: INTERNAL MEDICINE

## 2022-08-02 PROCEDURE — 1101F PR PT FALLS ASSESS DOC 0-1 FALLS W/OUT INJ PAST YR: ICD-10-PCS | Mod: CPTII,S$GLB,, | Performed by: INTERNAL MEDICINE

## 2022-08-02 PROCEDURE — 99214 OFFICE O/P EST MOD 30 MIN: CPT | Mod: S$GLB,,, | Performed by: INTERNAL MEDICINE

## 2022-08-02 PROCEDURE — 3008F BODY MASS INDEX DOCD: CPT | Mod: CPTII,S$GLB,, | Performed by: INTERNAL MEDICINE

## 2022-08-02 PROCEDURE — 1159F MED LIST DOCD IN RCRD: CPT | Mod: CPTII,S$GLB,, | Performed by: INTERNAL MEDICINE

## 2022-08-02 PROCEDURE — 93000 EKG 12-LEAD: ICD-10-PCS | Mod: S$GLB,,, | Performed by: INTERNAL MEDICINE

## 2022-08-02 PROCEDURE — 4010F ACE/ARB THERAPY RXD/TAKEN: CPT | Mod: CPTII,S$GLB,, | Performed by: INTERNAL MEDICINE

## 2022-08-02 PROCEDURE — 3074F PR MOST RECENT SYSTOLIC BLOOD PRESSURE < 130 MM HG: ICD-10-PCS | Mod: CPTII,S$GLB,, | Performed by: INTERNAL MEDICINE

## 2022-08-02 PROCEDURE — 93000 ELECTROCARDIOGRAM COMPLETE: CPT | Mod: S$GLB,,, | Performed by: INTERNAL MEDICINE

## 2022-08-02 PROCEDURE — 1126F PR PAIN SEVERITY QUANTIFIED, NO PAIN PRESENT: ICD-10-PCS | Mod: CPTII,S$GLB,, | Performed by: INTERNAL MEDICINE

## 2022-08-02 PROCEDURE — 99214 PR OFFICE/OUTPT VISIT, EST, LEVL IV, 30-39 MIN: ICD-10-PCS | Mod: S$GLB,,, | Performed by: INTERNAL MEDICINE

## 2022-08-02 PROCEDURE — 1159F PR MEDICATION LIST DOCUMENTED IN MEDICAL RECORD: ICD-10-PCS | Mod: CPTII,S$GLB,, | Performed by: INTERNAL MEDICINE

## 2022-08-02 PROCEDURE — 4010F PR ACE/ARB THEARPY RXD/TAKEN: ICD-10-PCS | Mod: CPTII,S$GLB,, | Performed by: INTERNAL MEDICINE

## 2022-08-02 RX ORDER — CETIRIZINE HYDROCHLORIDE 10 MG/1
10 TABLET ORAL DAILY
COMMUNITY

## 2022-08-02 NOTE — PROGRESS NOTES
"Subjective:    Patient ID:  Edel Edwards III is a 66 y.o. male who presents for follow-up of Medication Management      HPI     HTN, DM, CAD - TESSA to LAD 12/20/16 7/6/18 [facility] ED 6, LAD stent widely patent, D1  Osteal 90% - "stent FDC" - medium vessel, Cx distal 70% - small distal vessel, RCA medium size with diffuse mid 70%  Reviewed with Dr Whaley  Continue with medical Rx     12/20/16 [facility] ED 17, no LV, LAD tandem proximal 90% lesions, Cx distal 70%, RCA medium with diffuse mid 80%        Description of the findings of the procedure: calcified, fibrotic 90%prox LAD --> 0% by predil with 3.5 angioscore and NC balloon. Placement of a 3.5 x 30 mm TESSA.     He continued to have mostly fatigue symptoms following stent     Echo 10/27/21  · The left ventricle is normal in size with concentric remodeling and normal systolic function.  · The estimated ejection fraction is 60%.  · Indeterminate left ventricular diastolic function.  · Mild left atrial enlargement.  · Normal right ventricular size with normal right ventricular systolic function.  · Mild right atrial enlargement.  · Mild pulmonic regurgitation.  · Normal central venous pressure (3 mmHg).  · The estimated PA systolic pressure is 29 mmHg.  · There is no pulmonary hypertension.    Stress test 10/27/21    Normal myocardial perfusion scan. There is no evidence of myocardial ischemia or infarction.    There is a  moderate to severe intensity fixed defect in the inferior wall of the left ventricle secondary to diaphragm attenuation.    The gated perfusion images showed an ejection fraction of 74% post stress.    There is normal wall motion post stress.    The EKG portion of this study is negative for ischemia.    The patient reported no chest pain during the stress test.    There were no arrhythmias during stress.       5/23/17 Reviewed cath film and stress test with Dr Fried - could be a candidate for PCI of RCA although this is a medium sized " diffusely diseased vessel. Given improved symptoms will continue to treat medically for now     7/24/18 Did well after [facility]  Denies CP  Had mild stable OLIVIER     10/29/18 Not exercising much  Denies CP  Stable OLIVIER  EKG NSR - ok     4/9/19 Denies CP or SOB  Still not exercising much  EKG NSR - ok     8/13/21 Mild OLIVIER and occasional sharp CP  EKG NSR PRWP   Continue Rx for CAD, HTN, HLD, DM  OV 3 months with echo and lexiscan myoview for CP and OLIVIER - says he can't walk treadmill due to joint issues    8/2/22 Denies CP or SOB.  Hytrin apparently no longer being manufactured  BP controlled  EKG NSR PRWP    Review of Systems   Constitutional: Negative for decreased appetite.   HENT: Negative for ear discharge.    Eyes: Negative for blurred vision.   Endocrine: Negative for polyphagia.   Skin: Negative for nail changes.   Genitourinary: Negative for bladder incontinence.   Neurological: Negative for aphonia.   Psychiatric/Behavioral: Negative for hallucinations.   Allergic/Immunologic: Negative for hives.        Objective:    Physical Exam  Constitutional:       Appearance: He is well-developed.   HENT:      Head: Normocephalic and atraumatic.   Eyes:      Conjunctiva/sclera: Conjunctivae normal.      Pupils: Pupils are equal, round, and reactive to light.   Cardiovascular:      Rate and Rhythm: Normal rate.      Pulses: Intact distal pulses.      Heart sounds: Normal heart sounds.   Pulmonary:      Effort: Pulmonary effort is normal.      Breath sounds: Normal breath sounds.   Abdominal:      General: Bowel sounds are normal.      Palpations: Abdomen is soft.   Musculoskeletal:         General: Normal range of motion.      Cervical back: Normal range of motion and neck supple.   Skin:     General: Skin is warm and dry.   Neurological:      Mental Status: He is alert and oriented to person, place, and time.           Assessment:       1. Essential hypertension    2. Coronary artery disease involving native coronary artery of  native heart, unspecified whether angina present    3. OLIVIER (dyspnea on exertion)    4. Primary hypertension    5. Hyperlipidemia, unspecified hyperlipidemia type    6. Chest pain, atypical         Plan:       Observe BP off of hytrin - if not controlled consider addin norvasc  Continue Rx for CAD, HTN, HLD, DM  OV 6 months

## 2022-08-09 ENCOUNTER — PATIENT OUTREACH (OUTPATIENT)
Dept: ADMINISTRATIVE | Facility: HOSPITAL | Age: 67
End: 2022-08-09
Payer: MEDICARE

## 2022-08-09 DIAGNOSIS — E11.40 TYPE 2 DIABETES MELLITUS WITH DIABETIC NEUROPATHY, WITHOUT LONG-TERM CURRENT USE OF INSULIN: Primary | ICD-10-CM

## 2022-08-12 ENCOUNTER — LAB VISIT (OUTPATIENT)
Dept: LAB | Facility: HOSPITAL | Age: 67
End: 2022-08-12
Attending: UROLOGY
Payer: MEDICARE

## 2022-08-12 DIAGNOSIS — R97.20 ELEVATED PSA: ICD-10-CM

## 2022-08-12 LAB — COMPLEXED PSA SERPL-MCNC: 8.1 NG/ML (ref 0–4)

## 2022-08-12 PROCEDURE — 36415 COLL VENOUS BLD VENIPUNCTURE: CPT | Mod: PO | Performed by: UROLOGY

## 2022-08-12 PROCEDURE — 84153 ASSAY OF PSA TOTAL: CPT | Performed by: UROLOGY

## 2022-08-16 ENCOUNTER — OFFICE VISIT (OUTPATIENT)
Dept: UROLOGY | Facility: CLINIC | Age: 67
End: 2022-08-16
Payer: MEDICARE

## 2022-08-16 VITALS — HEIGHT: 72 IN | BODY MASS INDEX: 30.44 KG/M2 | WEIGHT: 224.75 LBS

## 2022-08-16 DIAGNOSIS — N52.9 ED (ERECTILE DYSFUNCTION) OF ORGANIC ORIGIN: ICD-10-CM

## 2022-08-16 DIAGNOSIS — N40.0 BPH WITHOUT OBSTRUCTION/LOWER URINARY TRACT SYMPTOMS: ICD-10-CM

## 2022-08-16 DIAGNOSIS — R35.1 NOCTURIA MORE THAN TWICE PER NIGHT: ICD-10-CM

## 2022-08-16 DIAGNOSIS — R97.20 ELEVATED PSA: Primary | ICD-10-CM

## 2022-08-16 PROCEDURE — 99214 PR OFFICE/OUTPT VISIT, EST, LEVL IV, 30-39 MIN: ICD-10-PCS | Mod: S$GLB,,, | Performed by: UROLOGY

## 2022-08-16 PROCEDURE — 1159F MED LIST DOCD IN RCRD: CPT | Mod: CPTII,S$GLB,, | Performed by: UROLOGY

## 2022-08-16 PROCEDURE — 1126F PR PAIN SEVERITY QUANTIFIED, NO PAIN PRESENT: ICD-10-PCS | Mod: CPTII,S$GLB,, | Performed by: UROLOGY

## 2022-08-16 PROCEDURE — 3008F BODY MASS INDEX DOCD: CPT | Mod: CPTII,S$GLB,, | Performed by: UROLOGY

## 2022-08-16 PROCEDURE — 99999 PR PBB SHADOW E&M-EST. PATIENT-LVL V: ICD-10-PCS | Mod: PBBFAC,,, | Performed by: UROLOGY

## 2022-08-16 PROCEDURE — 3008F PR BODY MASS INDEX (BMI) DOCUMENTED: ICD-10-PCS | Mod: CPTII,S$GLB,, | Performed by: UROLOGY

## 2022-08-16 PROCEDURE — 99999 PR PBB SHADOW E&M-EST. PATIENT-LVL V: CPT | Mod: PBBFAC,,, | Performed by: UROLOGY

## 2022-08-16 PROCEDURE — 4010F ACE/ARB THERAPY RXD/TAKEN: CPT | Mod: CPTII,S$GLB,, | Performed by: UROLOGY

## 2022-08-16 PROCEDURE — 1159F PR MEDICATION LIST DOCUMENTED IN MEDICAL RECORD: ICD-10-PCS | Mod: CPTII,S$GLB,, | Performed by: UROLOGY

## 2022-08-16 PROCEDURE — 1101F PR PT FALLS ASSESS DOC 0-1 FALLS W/OUT INJ PAST YR: ICD-10-PCS | Mod: CPTII,S$GLB,, | Performed by: UROLOGY

## 2022-08-16 PROCEDURE — 4010F PR ACE/ARB THEARPY RXD/TAKEN: ICD-10-PCS | Mod: CPTII,S$GLB,, | Performed by: UROLOGY

## 2022-08-16 PROCEDURE — 3288F FALL RISK ASSESSMENT DOCD: CPT | Mod: CPTII,S$GLB,, | Performed by: UROLOGY

## 2022-08-16 PROCEDURE — 81001 POCT URINALYSIS, DIPSTICK OR TABLET REAGENT, AUTOMATED, WITH MICROSCOP: ICD-10-PCS | Mod: S$GLB,,, | Performed by: UROLOGY

## 2022-08-16 PROCEDURE — 3288F PR FALLS RISK ASSESSMENT DOCUMENTED: ICD-10-PCS | Mod: CPTII,S$GLB,, | Performed by: UROLOGY

## 2022-08-16 PROCEDURE — 1101F PT FALLS ASSESS-DOCD LE1/YR: CPT | Mod: CPTII,S$GLB,, | Performed by: UROLOGY

## 2022-08-16 PROCEDURE — 99214 OFFICE O/P EST MOD 30 MIN: CPT | Mod: S$GLB,,, | Performed by: UROLOGY

## 2022-08-16 PROCEDURE — 81001 URINALYSIS AUTO W/SCOPE: CPT | Mod: S$GLB,,, | Performed by: UROLOGY

## 2022-08-16 PROCEDURE — 1126F AMNT PAIN NOTED NONE PRSNT: CPT | Mod: CPTII,S$GLB,, | Performed by: UROLOGY

## 2022-08-16 PROCEDURE — 3072F LOW RISK FOR RETINOPATHY: CPT | Mod: CPTII,S$GLB,, | Performed by: UROLOGY

## 2022-08-16 PROCEDURE — 3072F PR LOW RISK FOR RETINOPATHY: ICD-10-PCS | Mod: CPTII,S$GLB,, | Performed by: UROLOGY

## 2022-08-16 NOTE — PROGRESS NOTES
Subjective:       Patient ID: Edel Edwards III is a 67 y.o. male The patient's last visit with me was on 8/26/2021.     Chief Complaint:   Chief Complaint   Patient presents with    Annual Exam       Elevated PSA  Patient is here with an elevated PSA. He has no personal history and no family history of prostate cancer.  He has a prior genitourinary history of erectile dysfunction, and previous biopsies a few years ago with Dr. Umanzor.    Component Ref Range & Units 4 d ago 3 yr ago   PSA Diagnostic 0.00 - 4.00 ng/mL 8.1 High   6.3 High  CM    Comment: The testing method is a chemiluminescent microparticle immunoassay   manufactured by [employer] and performed on the [employer]   or   [employer] system. Values obtained with different assay manufacturers   for   methods may be different and cannot be used interchangeably.   PSA Expected levels:   Hormonal Therapy: <0.05 ng/ml   Prostatectomy: <0.01 ng/ml   Radiation Therapy: <1.00 ng/ml    Resulting Agency  [facility]             Narrative  Performed by: [facility]  1 year      Specimen Collected: 08/12/22 09:05 Last Resulted: 08/12/22 16:36               Benign Prostatic Hyperplasia  He patient reports nocturia two times a night. He denies straining, urgency and weak stream. The patient states symptoms are of moderate severity. Onset of symptoms was several years ago and was gradual in onset.  He has no personal history and no family history of prostate cancer. He reports a history of no complicating symptoms. He denies flank pain, gross hematuria, kidney stones and recurrent UTI.  He is currently taking Flomax.  He has a right directed stream.    Erectile Dysfunction  Patient complains of erectile dysfunction. Onset of dysfunction was several years ago and was gradual in onset.  Patient states the nature of difficulty is maintaining erection. Full erections occur with intercourse. Partial erections occur with intercourse. Libido is not affected. Risk  factors for ED include cardiovascular disease and diabetes mellitus. Patient denies history of pelvic radiation. Previous treatment of ED includes Levitra and Cialis.  He is no longer taking these after his recent cardiac stent placement on Imdur Rx.    Penile Rash  He has intermittently had issues with a red rash on his penis for a few year.  It will sometime itch.  He usually applies Desonide or Lotrisone cream but it always returns.      ACTIVE MEDICAL ISSUES:  Patient Active Problem List   Diagnosis    Type 2 diabetes mellitus with neurologic complication, without long-term current use of insulin    HTN (hypertension)    Hyperlipidemia    Type 2 diabetes mellitus without retinopathy - Both Eyes    Nuclear sclerosis - Both Eyes    BMI 33.0-33.9,adult    Elevated PSA    Chest pain, atypical    CAD (coronary artery disease)    OLIVIER (dyspnea on exertion)    Refractive error    Wears contact lenses    Class 1 obesity due to excess calories with serious comorbidity in adult       ALLERGIES AND MEDICATIONS: updated and reviewed.  Review of patient's allergies indicates:  No Known Allergies  Current Outpatient Medications   Medication Sig    ACCU-CHEK SMARTVIEW TEST STRIP Strp as needed.     aspirin (ECOTRIN) 81 MG EC tablet Take 81 mg by mouth once daily.    atorvastatin (LIPITOR) 40 MG tablet TAKE 1/2 TABLET BY MOUTH EVERY DAY    azilsartan medoxomiL 80 mg Tab Take by mouth once daily.     clopidogreL (PLAVIX) 75 mg tablet Take 1 tablet (75 mg total) by mouth once daily.    diphth,pertus,acell,,tetanus (BOOSTRIX) 2.5-8-5 Lf-mcg-Lf/0.5mL Syrg injection Boostrix Tdap 2.5 Lf unit-8 mcg-5 Lf/0.5 mL intramuscular syringe   inject 0.5 milliliter intramuscularly    etodolac (LODINE) 500 MG tablet Take 1 tablet (500 mg total) by mouth 2 (two) times daily.    flu vac ts 2016-17,4 yr up,/PF (FLU VAC TS 2014-15,36MOS+,,PF,) 45 mcg (15 mcg x 3)/0.5 mL Flucelvax [id number] (PF) 45 mcg (15 mcg x 3)/0.5 mL IM  syringe   inject 0.5 milliliter intramuscularly    gabapentin (NEURONTIN) 800 MG tablet Take 0.5 tablets (400 mg total) by mouth once daily.    influenza (FLULAVAL QUADRIVALENT) 60 mcg (15 mcg x 4)/0.5 mL Syrg vaccine Fluarix Quad [id number] (PF) 60 mcg (15 mcg x 4)/0.5 mL IM syringe   inject 0.5 milliliter intramuscularly    isosorbide mononitrate (IMDUR) 30 MG 24 hr tablet Take 1 tablet (30 mg total) by mouth once daily.    JARDIANCE 10 mg tablet Take 10 mg by mouth once daily.    ketoconazole (NIZORAL) 2 % cream Apply topically 2 (two) times daily.    metformin (GLUCOPHAGE) 1000 MG tablet Take 1,000 mg by mouth 2 (two) times daily with meals.    metoprolol succinate (TOPROL-XL) 25 MG 24 hr tablet Take 1 tablet (25 mg total) by mouth once daily.    mometasone 0.1% (ELOCON) 0.1 % cream Apply topically once daily.    OZEMPIC 1 mg/dose (2 mg/1.5 mL) PnIj INJECT 1 MG WEEKLY    SHINGRIX, PF, 50 mcg/0.5 mL injection     tamsulosin (FLOMAX) 0.4 mg Cap Take 1 capsule (0.4 mg total) by mouth once daily.    zoster vaccine live, PF, (ZOSTAVAX) 19,400 unit/0.65 mL injection Zostavax (PF) 19,400 unit/0.65 mL subcutaneous suspension   inject contents of 1 vial subcutaneously    cetirizine (ZYRTEC) 10 MG tablet Take 10 mg by mouth once daily.    clotrimazole-betamethasone 1-0.05% (LOTRISONE) cream Apply topically as needed.    methylPREDNISolone (MEDROL) 4 MG Tab methylprednisolone 4 mg tablet   Take 1 tablet every day by oral route with meals.    terazosin (HYTRIN) 2 MG capsule Take 2 mg by mouth every evening.    terazosin (HYTRIN) 2 MG capsule Take 1 capsule (2 mg total) by mouth every evening.     No current facility-administered medications for this visit.       Review of Systems   Constitutional: Negative for activity change, fatigue, fever and unexpected weight change.   HENT: Negative for congestion.    Eyes: Negative for redness.   Respiratory: Negative for chest tightness and shortness of breath.     Cardiovascular: Negative for chest pain and leg swelling.   Gastrointestinal: Negative for abdominal pain, constipation, diarrhea, nausea and vomiting.   Genitourinary: Negative for dysuria, flank pain, frequency, hematuria, penile pain, penile swelling, scrotal swelling, testicular pain and urgency.   Musculoskeletal: Negative for arthralgias and back pain.   Neurological: Negative for dizziness and light-headedness.   Psychiatric/Behavioral: Negative for behavioral problems and confusion. The patient is not nervous/anxious.    All other systems reviewed and are negative.      Objective:      Vitals:    08/16/22 0908   Weight: 101.9 kg (224 lb 12.1 oz)   Height: 6' (1.829 m)     Physical Exam  Vitals and nursing note reviewed.   Constitutional:       Appearance: He is well-developed.   HENT:      Head: Normocephalic.   Eyes:      Conjunctiva/sclera: Conjunctivae normal.   Neck:      Thyroid: No thyromegaly.      Trachea: No tracheal deviation.   Cardiovascular:      Rate and Rhythm: Normal rate.      Heart sounds: Normal heart sounds.   Pulmonary:      Effort: Pulmonary effort is normal. No respiratory distress.      Breath sounds: Normal breath sounds. No wheezing.   Abdominal:      General: Bowel sounds are normal.      Palpations: Abdomen is soft.      Tenderness: There is no abdominal tenderness. There is no rebound.      Hernia: No hernia is present.   Musculoskeletal:         General: No tenderness. Normal range of motion.      Cervical back: Normal range of motion and neck supple.   Lymphadenopathy:      Cervical: No cervical adenopathy.   Skin:     General: Skin is warm and dry.      Findings: No erythema or rash.   Neurological:      Mental Status: He is alert and oriented to person, place, and time.   Psychiatric:         Behavior: Behavior normal.         Thought Content: Thought content normal.         Judgment: Judgment normal.         Urine dipstick shows negative for all components.  Micro exam:  negative for WBC's or RBC's.    Assessment:       1. Elevated PSA    2. BPH without obstruction/lower urinary tract symptoms    3. Nocturia more than twice per night    4. ED (erectile dysfunction) of organic origin          Plan:       1. Elevated PSA  We talked about a biopsy vs MRI.    - Basic Metabolic Panel; Future  - MRI Prostate W W/O Contrast; Future    2. BPH without obstruction/lower urinary tract symptoms  Flomax    3. Nocturia more than twice per night  Limit     4. ED (erectile dysfunction) of organic origin  IPP if he wants             Follow up in about 6 weeks (around 9/27/2022) for Follow up, Review X-ray.

## 2022-08-17 LAB
BILIRUB SERPL-MCNC: NORMAL MG/DL
BLOOD URINE, POC: NORMAL
COLOR, POC UA: YELLOW
GLUCOSE UR QL STRIP: 250
KETONES UR QL STRIP: NORMAL
LEUKOCYTE ESTERASE URINE, POC: NORMAL
NITRITE, POC UA: NORMAL
PH, POC UA: 5
PROTEIN, POC: NORMAL
SPECIFIC GRAVITY, POC UA: 1020
UROBILINOGEN, POC UA: NORMAL

## 2022-08-30 ENCOUNTER — PATIENT MESSAGE (OUTPATIENT)
Dept: UROLOGY | Facility: CLINIC | Age: 67
End: 2022-08-30
Payer: MEDICARE

## 2022-09-13 ENCOUNTER — OFFICE VISIT (OUTPATIENT)
Dept: UROLOGY | Facility: CLINIC | Age: 67
End: 2022-09-13
Payer: MEDICARE

## 2022-09-13 VITALS — WEIGHT: 222.56 LBS | HEIGHT: 72 IN | BODY MASS INDEX: 30.14 KG/M2

## 2022-09-13 DIAGNOSIS — N40.0 BPH WITHOUT OBSTRUCTION/LOWER URINARY TRACT SYMPTOMS: ICD-10-CM

## 2022-09-13 DIAGNOSIS — N52.9 ED (ERECTILE DYSFUNCTION) OF ORGANIC ORIGIN: ICD-10-CM

## 2022-09-13 DIAGNOSIS — R97.20 ELEVATED PSA: Primary | ICD-10-CM

## 2022-09-13 DIAGNOSIS — R35.1 NOCTURIA MORE THAN TWICE PER NIGHT: ICD-10-CM

## 2022-09-13 LAB
BILIRUB SERPL-MCNC: NORMAL MG/DL
BLOOD URINE, POC: NORMAL
COLOR, POC UA: YELLOW
GLUCOSE UR QL STRIP: 500
KETONES UR QL STRIP: NORMAL
LEUKOCYTE ESTERASE URINE, POC: NORMAL
NITRITE, POC UA: NORMAL
PH, POC UA: 5
PROTEIN, POC: NORMAL
SPECIFIC GRAVITY, POC UA: 1020
UROBILINOGEN, POC UA: NORMAL

## 2022-09-13 PROCEDURE — 81001 POCT URINALYSIS, DIPSTICK OR TABLET REAGENT, AUTOMATED, WITH MICROSCOP: ICD-10-PCS | Mod: S$GLB,,, | Performed by: UROLOGY

## 2022-09-13 PROCEDURE — 1159F PR MEDICATION LIST DOCUMENTED IN MEDICAL RECORD: ICD-10-PCS | Mod: CPTII,S$GLB,, | Performed by: UROLOGY

## 2022-09-13 PROCEDURE — 3288F FALL RISK ASSESSMENT DOCD: CPT | Mod: CPTII,S$GLB,, | Performed by: UROLOGY

## 2022-09-13 PROCEDURE — 99214 PR OFFICE/OUTPT VISIT, EST, LEVL IV, 30-39 MIN: ICD-10-PCS | Mod: S$GLB,,, | Performed by: UROLOGY

## 2022-09-13 PROCEDURE — 1126F AMNT PAIN NOTED NONE PRSNT: CPT | Mod: CPTII,S$GLB,, | Performed by: UROLOGY

## 2022-09-13 PROCEDURE — 4010F PR ACE/ARB THEARPY RXD/TAKEN: ICD-10-PCS | Mod: CPTII,S$GLB,, | Performed by: UROLOGY

## 2022-09-13 PROCEDURE — 1101F PR PT FALLS ASSESS DOC 0-1 FALLS W/OUT INJ PAST YR: ICD-10-PCS | Mod: CPTII,S$GLB,, | Performed by: UROLOGY

## 2022-09-13 PROCEDURE — 1160F RVW MEDS BY RX/DR IN RCRD: CPT | Mod: CPTII,S$GLB,, | Performed by: UROLOGY

## 2022-09-13 PROCEDURE — 1160F PR REVIEW ALL MEDS BY PRESCRIBER/CLIN PHARMACIST DOCUMENTED: ICD-10-PCS | Mod: CPTII,S$GLB,, | Performed by: UROLOGY

## 2022-09-13 PROCEDURE — 1126F PR PAIN SEVERITY QUANTIFIED, NO PAIN PRESENT: ICD-10-PCS | Mod: CPTII,S$GLB,, | Performed by: UROLOGY

## 2022-09-13 PROCEDURE — 3072F PR LOW RISK FOR RETINOPATHY: ICD-10-PCS | Mod: CPTII,S$GLB,, | Performed by: UROLOGY

## 2022-09-13 PROCEDURE — 4010F ACE/ARB THERAPY RXD/TAKEN: CPT | Mod: CPTII,S$GLB,, | Performed by: UROLOGY

## 2022-09-13 PROCEDURE — 3008F BODY MASS INDEX DOCD: CPT | Mod: CPTII,S$GLB,, | Performed by: UROLOGY

## 2022-09-13 PROCEDURE — 99214 OFFICE O/P EST MOD 30 MIN: CPT | Mod: S$GLB,,, | Performed by: UROLOGY

## 2022-09-13 PROCEDURE — 99999 PR PBB SHADOW E&M-EST. PATIENT-LVL IV: ICD-10-PCS | Mod: PBBFAC,,, | Performed by: UROLOGY

## 2022-09-13 PROCEDURE — 99999 PR PBB SHADOW E&M-EST. PATIENT-LVL IV: CPT | Mod: PBBFAC,,, | Performed by: UROLOGY

## 2022-09-13 PROCEDURE — 3072F LOW RISK FOR RETINOPATHY: CPT | Mod: CPTII,S$GLB,, | Performed by: UROLOGY

## 2022-09-13 PROCEDURE — 1101F PT FALLS ASSESS-DOCD LE1/YR: CPT | Mod: CPTII,S$GLB,, | Performed by: UROLOGY

## 2022-09-13 PROCEDURE — 3008F PR BODY MASS INDEX (BMI) DOCUMENTED: ICD-10-PCS | Mod: CPTII,S$GLB,, | Performed by: UROLOGY

## 2022-09-13 PROCEDURE — 1159F MED LIST DOCD IN RCRD: CPT | Mod: CPTII,S$GLB,, | Performed by: UROLOGY

## 2022-09-13 PROCEDURE — 3288F PR FALLS RISK ASSESSMENT DOCUMENTED: ICD-10-PCS | Mod: CPTII,S$GLB,, | Performed by: UROLOGY

## 2022-09-13 PROCEDURE — 81001 URINALYSIS AUTO W/SCOPE: CPT | Mod: S$GLB,,, | Performed by: UROLOGY

## 2022-09-13 RX ORDER — TAMSULOSIN HYDROCHLORIDE 0.4 MG/1
1 CAPSULE ORAL DAILY
Qty: 90 CAPSULE | Refills: 3 | Status: SHIPPED | OUTPATIENT
Start: 2022-09-13 | End: 2023-08-23

## 2022-09-13 NOTE — PROGRESS NOTES
Subjective:       Patient ID: Edel Edwards III is a 67 y.o. male The patient's last visit with me was on 8/16/2022.     Chief Complaint:   Chief Complaint   Patient presents with    Follow-up         Elevated PSA  Patient is here with an elevated PSA. He has no personal history and no family history of prostate cancer.  He has a prior genitourinary history of erectile dysfunction, and previous biopsies a few years ago with Dr. Umanzor.    Component Ref Range & Units 4 d ago 3 yr ago   PSA Diagnostic 0.00 - 4.00 ng/mL 8.1 High   6.3 High  CM    Comment: The testing method is a chemiluminescent microparticle immunoassay   manufactured by [employer] and performed on the [employer]   or   MedStart[employer] system. Values obtained with different assay manufacturers   for   methods may be different and cannot be used interchangeably.   PSA Expected levels:   Hormonal Therapy: <0.05 ng/ml   Prostatectomy: <0.01 ng/ml   Radiation Therapy: <1.00 ng/ml    Resulting Agency  [facility]             Narrative  Performed by: [facility]  1 year      Specimen Collected: 08/12/22 09:05 Last Resulted: 08/12/22 16:36               Benign Prostatic Hyperplasia  He patient reports nocturia two times a night. He denies straining, urgency and weak stream. The patient states symptoms are of moderate severity. Onset of symptoms was several years ago and was gradual in onset.  He has no personal history and no family history of prostate cancer. He reports a history of no complicating symptoms. He denies flank pain, gross hematuria, kidney stones and recurrent UTI.  He is currently taking Flomax.  He has a right directed stream.    Erectile Dysfunction  Patient complains of erectile dysfunction. Onset of dysfunction was several years ago and was gradual in onset.  Patient states the nature of difficulty is maintaining erection. Full erections occur with intercourse. Partial erections occur with intercourse. Libido is not affected. Risk factors  for ED include cardiovascular disease and diabetes mellitus. Patient denies history of pelvic radiation. Previous treatment of ED includes Levitra and Cialis.  He is no longer taking these after his recent cardiac stent placement on Imdur Rx.    Penile Rash  He has intermittently had issues with a red rash on his penis for a few year.  It will sometime itch.  He usually applies Desonide or Lotrisone cream but it always returns.      ACTIVE MEDICAL ISSUES:  Patient Active Problem List   Diagnosis    Type 2 diabetes mellitus with neurologic complication, without long-term current use of insulin    HTN (hypertension)    Hyperlipidemia    Type 2 diabetes mellitus without retinopathy - Both Eyes    Nuclear sclerosis - Both Eyes    BMI 33.0-33.9,adult    Elevated PSA    Chest pain, atypical    CAD (coronary artery disease)    OLIVIER (dyspnea on exertion)    Refractive error    Wears contact lenses    Class 1 obesity due to excess calories with serious comorbidity in adult       ALLERGIES AND MEDICATIONS: updated and reviewed.  Review of patient's allergies indicates:  No Known Allergies  Current Outpatient Medications   Medication Sig    ACCU-CHEK SMARTVIEW TEST STRIP Strp as needed.     aspirin (ECOTRIN) 81 MG EC tablet Take 81 mg by mouth once daily.    atorvastatin (LIPITOR) 40 MG tablet TAKE 1/2 TABLET BY MOUTH EVERY DAY    clopidogreL (PLAVIX) 75 mg tablet Take 1 tablet (75 mg total) by mouth once daily.    diphth,pertus,acell,,tetanus (BOOSTRIX) 2.5-8-5 Lf-mcg-Lf/0.5mL Syrg injection Boostrix Tdap 2.5 Lf unit-8 mcg-5 Lf/0.5 mL intramuscular syringe   inject 0.5 milliliter intramuscularly    etodolac (LODINE) 500 MG tablet Take 1 tablet (500 mg total) by mouth 2 (two) times daily.    flu vac ts 2016-17,4 yr up,/PF (FLU VAC TS 2014-15,36MOS+,,PF,) 45 mcg (15 mcg x 3)/0.5 mL Flucelvax 2339-0925 (PF) 45 mcg (15 mcg x 3)/0.5 mL IM syringe   inject 0.5 milliliter intramuscularly    gabapentin (NEURONTIN) 800 MG tablet Take  0.5 tablets (400 mg total) by mouth once daily.    influenza (FLULAVAL QUADRIVALENT) 60 mcg (15 mcg x 4)/0.5 mL Syrg vaccine Fluarix Quad 4000-4412 (PF) 60 mcg (15 mcg x 4)/0.5 mL IM syringe   inject 0.5 milliliter intramuscularly    isosorbide mononitrate (IMDUR) 30 MG 24 hr tablet Take 1 tablet (30 mg total) by mouth once daily.    JARDIANCE 10 mg tablet Take 10 mg by mouth once daily.    ketoconazole (NIZORAL) 2 % cream Apply topically 2 (two) times daily.    metformin (GLUCOPHAGE) 1000 MG tablet Take 1,000 mg by mouth 2 (two) times daily with meals.    metoprolol succinate (TOPROL-XL) 25 MG 24 hr tablet Take 1 tablet (25 mg total) by mouth once daily.    mometasone 0.1% (ELOCON) 0.1 % cream Apply topically once daily.    OZEMPIC 1 mg/dose (2 mg/1.5 mL) PnIj INJECT 1 MG WEEKLY    SHINGRIX, PF, 50 mcg/0.5 mL injection     zoster vaccine live, PF, (ZOSTAVAX) 19,400 unit/0.65 mL injection Zostavax (PF) 19,400 unit/0.65 mL subcutaneous suspension   inject contents of 1 vial subcutaneously    azilsartan medoxomiL 80 mg Tab Take by mouth once daily.     cetirizine (ZYRTEC) 10 MG tablet Take 10 mg by mouth once daily.    clotrimazole-betamethasone 1-0.05% (LOTRISONE) cream Apply topically as needed.    methylPREDNISolone (MEDROL) 4 MG Tab methylprednisolone 4 mg tablet   Take 1 tablet every day by oral route with meals.    tamsulosin (FLOMAX) 0.4 mg Cap Take 1 capsule (0.4 mg total) by mouth once daily.     No current facility-administered medications for this visit.       Review of Systems   Constitutional:  Negative for activity change, fatigue, fever and unexpected weight change.   HENT:  Negative for congestion.    Eyes:  Negative for redness.   Respiratory:  Negative for chest tightness and shortness of breath.    Cardiovascular:  Negative for chest pain and leg swelling.   Gastrointestinal:  Negative for abdominal pain, constipation, diarrhea, nausea and vomiting.   Genitourinary:  Negative for dysuria, flank  pain, frequency, hematuria, penile pain, penile swelling, scrotal swelling, testicular pain and urgency.   Musculoskeletal:  Negative for arthralgias and back pain.   Neurological:  Negative for dizziness and light-headedness.   Psychiatric/Behavioral:  Negative for behavioral problems and confusion. The patient is not nervous/anxious.    All other systems reviewed and are negative.    Objective:      Vitals:    09/13/22 0927   Weight: 101 kg (222 lb 8.9 oz)   Height: 6' (1.829 m)       Physical Exam  Vitals and nursing note reviewed.   Constitutional:       Appearance: He is well-developed.   HENT:      Head: Normocephalic.   Eyes:      Conjunctiva/sclera: Conjunctivae normal.   Neck:      Thyroid: No thyromegaly.      Trachea: No tracheal deviation.   Cardiovascular:      Rate and Rhythm: Normal rate.      Heart sounds: Normal heart sounds.   Pulmonary:      Effort: Pulmonary effort is normal. No respiratory distress.      Breath sounds: Normal breath sounds. No wheezing.   Abdominal:      General: Bowel sounds are normal.      Palpations: Abdomen is soft.      Tenderness: There is no abdominal tenderness. There is no rebound.      Hernia: No hernia is present.   Musculoskeletal:         General: No tenderness. Normal range of motion.      Cervical back: Normal range of motion and neck supple.   Lymphadenopathy:      Cervical: No cervical adenopathy.   Skin:     General: Skin is warm and dry.      Findings: No erythema or rash.   Neurological:      Mental Status: He is alert and oriented to person, place, and time.   Psychiatric:         Behavior: Behavior normal.         Thought Content: Thought content normal.         Judgment: Judgment normal.       Urine dipstick shows negative for all components.  Micro exam: negative for WBC's or RBC's.    DIS MRI Prostate 8/30/2022  83 cc  PIRADS 2 in transition and peripheral zones.  Overall impression, BPH  PSA density 0.975    Assessment:       1. Elevated PSA    2. BPH  without obstruction/lower urinary tract symptoms    3. Nocturia more than twice per night    4. ED (erectile dysfunction) of organic origin            Plan:       1. Elevated PSA    - Prostate Specific Antigen, Diagnostic; Future    2. BPH without obstruction/lower urinary tract symptoms    - POCT urinalysis, dipstick or tablet reag  - tamsulosin (FLOMAX) 0.4 mg Cap; Take 1 capsule (0.4 mg total) by mouth once daily.  Dispense: 90 capsule; Refill: 3    3. Nocturia more than twice per night  Limit evening fluids    4. ED (erectile dysfunction) of organic origin  Declined today               Follow up in about 1 year (around 9/13/2023) for Review PSA.

## 2022-10-28 ENCOUNTER — OFFICE VISIT (OUTPATIENT)
Dept: OPTOMETRY | Facility: CLINIC | Age: 67
End: 2022-10-28
Payer: MEDICARE

## 2022-10-28 DIAGNOSIS — H47.093 PERIPAPILLARY ATROPHY OF BOTH EYES: ICD-10-CM

## 2022-10-28 DIAGNOSIS — H52.7 REFRACTIVE ERROR: ICD-10-CM

## 2022-10-28 DIAGNOSIS — I10 PRIMARY HYPERTENSION: ICD-10-CM

## 2022-10-28 DIAGNOSIS — H25.13 NUCLEAR SCLEROSIS OF BOTH EYES: ICD-10-CM

## 2022-10-28 DIAGNOSIS — E11.41 TYPE 2 DIABETES MELLITUS WITH DIABETIC MONONEUROPATHY, WITHOUT LONG-TERM CURRENT USE OF INSULIN: ICD-10-CM

## 2022-10-28 DIAGNOSIS — Z97.3 WEARS CONTACT LENSES: ICD-10-CM

## 2022-10-28 DIAGNOSIS — Z46.0 FITTING AND ADJUSTMENT OF SPECTACLES AND CONTACT LENSES: Primary | ICD-10-CM

## 2022-10-28 DIAGNOSIS — E11.9 TYPE 2 DIABETES MELLITUS WITHOUT RETINOPATHY: Primary | ICD-10-CM

## 2022-10-28 DIAGNOSIS — Z98.890 HISTORY OF LASER PHOTOCOAGULATION OF RETINA: ICD-10-CM

## 2022-10-28 PROCEDURE — 92014 COMPRE OPH EXAM EST PT 1/>: CPT | Mod: S$GLB,,, | Performed by: OPTOMETRIST

## 2022-10-28 PROCEDURE — 2023F DILAT RTA XM W/O RTNOPTHY: CPT | Mod: CPTII,S$GLB,, | Performed by: OPTOMETRIST

## 2022-10-28 PROCEDURE — 1101F PR PT FALLS ASSESS DOC 0-1 FALLS W/OUT INJ PAST YR: ICD-10-PCS | Mod: CPTII,S$GLB,, | Performed by: OPTOMETRIST

## 2022-10-28 PROCEDURE — 92015 PR REFRACTION: ICD-10-PCS | Mod: S$GLB,,, | Performed by: OPTOMETRIST

## 2022-10-28 PROCEDURE — 1160F PR REVIEW ALL MEDS BY PRESCRIBER/CLIN PHARMACIST DOCUMENTED: ICD-10-PCS | Mod: CPTII,S$GLB,, | Performed by: OPTOMETRIST

## 2022-10-28 PROCEDURE — 1159F MED LIST DOCD IN RCRD: CPT | Mod: CPTII,S$GLB,, | Performed by: OPTOMETRIST

## 2022-10-28 PROCEDURE — 92310 CONTACT LENS FITTING OU: CPT | Mod: CSM,S$GLB,, | Performed by: OPTOMETRIST

## 2022-10-28 PROCEDURE — 1159F PR MEDICATION LIST DOCUMENTED IN MEDICAL RECORD: ICD-10-PCS | Mod: CPTII,S$GLB,, | Performed by: OPTOMETRIST

## 2022-10-28 PROCEDURE — 92014 PR EYE EXAM, EST PATIENT,COMPREHESV: ICD-10-PCS | Mod: S$GLB,,, | Performed by: OPTOMETRIST

## 2022-10-28 PROCEDURE — 1160F RVW MEDS BY RX/DR IN RCRD: CPT | Mod: CPTII,S$GLB,, | Performed by: OPTOMETRIST

## 2022-10-28 PROCEDURE — 1101F PT FALLS ASSESS-DOCD LE1/YR: CPT | Mod: CPTII,S$GLB,, | Performed by: OPTOMETRIST

## 2022-10-28 PROCEDURE — 92310 PR CONTACT LENS FITTING (NO CHANGE): ICD-10-PCS | Mod: CSM,S$GLB,, | Performed by: OPTOMETRIST

## 2022-10-28 PROCEDURE — 1126F PR PAIN SEVERITY QUANTIFIED, NO PAIN PRESENT: ICD-10-PCS | Mod: CPTII,S$GLB,, | Performed by: OPTOMETRIST

## 2022-10-28 PROCEDURE — 92015 DETERMINE REFRACTIVE STATE: CPT | Mod: S$GLB,,, | Performed by: OPTOMETRIST

## 2022-10-28 PROCEDURE — 3288F FALL RISK ASSESSMENT DOCD: CPT | Mod: CPTII,S$GLB,, | Performed by: OPTOMETRIST

## 2022-10-28 PROCEDURE — 99999 PR PBB SHADOW E&M-EST. PATIENT-LVL III: ICD-10-PCS | Mod: PBBFAC,,, | Performed by: OPTOMETRIST

## 2022-10-28 PROCEDURE — 1126F AMNT PAIN NOTED NONE PRSNT: CPT | Mod: CPTII,S$GLB,, | Performed by: OPTOMETRIST

## 2022-10-28 PROCEDURE — 99999 PR PBB SHADOW E&M-EST. PATIENT-LVL III: CPT | Mod: PBBFAC,,, | Performed by: OPTOMETRIST

## 2022-10-28 PROCEDURE — 4010F ACE/ARB THERAPY RXD/TAKEN: CPT | Mod: CPTII,S$GLB,, | Performed by: OPTOMETRIST

## 2022-10-28 PROCEDURE — 3288F PR FALLS RISK ASSESSMENT DOCUMENTED: ICD-10-PCS | Mod: CPTII,S$GLB,, | Performed by: OPTOMETRIST

## 2022-10-28 PROCEDURE — 99999 PR PBB SHADOW E&M-EST. PATIENT-LVL II: ICD-10-PCS | Mod: PBBFAC,,, | Performed by: OPTOMETRIST

## 2022-10-28 PROCEDURE — 4010F PR ACE/ARB THEARPY RXD/TAKEN: ICD-10-PCS | Mod: CPTII,S$GLB,, | Performed by: OPTOMETRIST

## 2022-10-28 PROCEDURE — 99999 PR PBB SHADOW E&M-EST. PATIENT-LVL II: CPT | Mod: PBBFAC,,, | Performed by: OPTOMETRIST

## 2022-10-28 PROCEDURE — 2023F PR DILATED RETINAL EXAM W/O EVID OF RETINOPATHY: ICD-10-PCS | Mod: CPTII,S$GLB,, | Performed by: OPTOMETRIST

## 2022-10-28 NOTE — PROGRESS NOTES
HPI    Pt is here today for Diabetic eye exam with contact lens fitting.  He states that he is seeing well with his current contact lenses.    No current eye meds.  Last edited by Mynor Barrow on 10/28/2022 10:14 AM.            Assessment /Plan     For exam results, see Encounter Report.    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    Peripapillary atrophy of both eyes    Nuclear sclerosis of both eyes   Mild, monitor    Wears contact lenses  Refractive error   Rx specs   CHange CL power OS   Order trials   Remove nightly, replace monthly   Ok to order if happy with vision and comfort OU    RTC 1 year

## 2022-10-31 ENCOUNTER — PATIENT MESSAGE (OUTPATIENT)
Dept: OPTOMETRY | Facility: CLINIC | Age: 67
End: 2022-10-31
Payer: MEDICARE

## 2022-11-22 ENCOUNTER — PATIENT MESSAGE (OUTPATIENT)
Dept: OPTOMETRY | Facility: CLINIC | Age: 67
End: 2022-11-22
Payer: MEDICARE

## 2022-11-28 NOTE — PROGRESS NOTES
Subjective:       Patient ID: Edel Edwards III is a 64 y.o. male.    Chief Complaint: Diabetic Eye Exam and Contact Lens Follow Up    HPI     64 y.o. male is here for DM Check. Last glucose reading 129. Denies eye   pain and f/f. No discomfort or VA changes since last visit. Wear CTL full-   time do not sleep in CTL. Slight trouble with glare.     Eye Med's: No gtt    Last edited by DUNIA Haro on 6/29/2020  8:34 AM. (History)             Assessment:       1. Nuclear sclerosis of both eyes    2. Type 2 diabetes mellitus without retinopathy - Both Eyes    3. Essential hypertension    4. History of laser photocoagulation of retina    5. Refractive error        Plan:       Cataracts- Not visually significant.  DM-No NPDR OU.  HTN-No retinopathy OU.  H/o barrier laser OS for retinal hole-Stable.  RE-Needs CL F/U with Dr Sanchez.      Control DM & HTN.  F/U with Dr Sanchez on 8/4/2020 for CL F/U & yearly exam.      esorsaliyah rn

## 2023-01-09 RX ORDER — CLOPIDOGREL BISULFATE 75 MG/1
75 TABLET ORAL DAILY
Qty: 90 TABLET | Refills: 3 | Status: SHIPPED | OUTPATIENT
Start: 2023-01-09 | End: 2023-01-11 | Stop reason: SDUPTHER

## 2023-01-11 RX ORDER — CLOPIDOGREL BISULFATE 75 MG/1
75 TABLET ORAL DAILY
Qty: 90 TABLET | Refills: 3 | Status: SHIPPED | OUTPATIENT
Start: 2023-01-11 | End: 2024-02-11 | Stop reason: SDUPTHER

## 2023-01-24 ENCOUNTER — PES CALL (OUTPATIENT)
Dept: ADMINISTRATIVE | Facility: CLINIC | Age: 68
End: 2023-01-24
Payer: MEDICARE

## 2023-02-01 ENCOUNTER — OFFICE VISIT (OUTPATIENT)
Dept: CARDIOLOGY | Facility: CLINIC | Age: 68
End: 2023-02-01
Payer: MEDICARE

## 2023-02-01 VITALS
OXYGEN SATURATION: 95 % | BODY MASS INDEX: 30.6 KG/M2 | DIASTOLIC BLOOD PRESSURE: 58 MMHG | SYSTOLIC BLOOD PRESSURE: 109 MMHG | WEIGHT: 225.63 LBS | HEART RATE: 78 BPM | RESPIRATION RATE: 18 BRPM

## 2023-02-01 DIAGNOSIS — R07.89 CHEST PAIN, ATYPICAL: ICD-10-CM

## 2023-02-01 DIAGNOSIS — I25.10 CORONARY ARTERY DISEASE INVOLVING NATIVE CORONARY ARTERY OF NATIVE HEART, UNSPECIFIED WHETHER ANGINA PRESENT: ICD-10-CM

## 2023-02-01 DIAGNOSIS — I10 PRIMARY HYPERTENSION: Primary | ICD-10-CM

## 2023-02-01 DIAGNOSIS — R06.09 DOE (DYSPNEA ON EXERTION): ICD-10-CM

## 2023-02-01 DIAGNOSIS — E78.5 HYPERLIPIDEMIA, UNSPECIFIED HYPERLIPIDEMIA TYPE: ICD-10-CM

## 2023-02-01 PROCEDURE — 99999 PR PBB SHADOW E&M-EST. PATIENT-LVL IV: CPT | Mod: PBBFAC,,, | Performed by: INTERNAL MEDICINE

## 2023-02-01 PROCEDURE — 1159F MED LIST DOCD IN RCRD: CPT | Mod: CPTII,S$GLB,, | Performed by: INTERNAL MEDICINE

## 2023-02-01 PROCEDURE — 99999 PR PBB SHADOW E&M-EST. PATIENT-LVL IV: ICD-10-PCS | Mod: PBBFAC,,, | Performed by: INTERNAL MEDICINE

## 2023-02-01 PROCEDURE — 1126F AMNT PAIN NOTED NONE PRSNT: CPT | Mod: CPTII,S$GLB,, | Performed by: INTERNAL MEDICINE

## 2023-02-01 PROCEDURE — 93000 EKG 12-LEAD: ICD-10-PCS | Mod: S$GLB,,, | Performed by: INTERNAL MEDICINE

## 2023-02-01 PROCEDURE — 1126F PR PAIN SEVERITY QUANTIFIED, NO PAIN PRESENT: ICD-10-PCS | Mod: CPTII,S$GLB,, | Performed by: INTERNAL MEDICINE

## 2023-02-01 PROCEDURE — 99214 OFFICE O/P EST MOD 30 MIN: CPT | Mod: S$GLB,,, | Performed by: INTERNAL MEDICINE

## 2023-02-01 PROCEDURE — 3008F BODY MASS INDEX DOCD: CPT | Mod: CPTII,S$GLB,, | Performed by: INTERNAL MEDICINE

## 2023-02-01 PROCEDURE — 4010F ACE/ARB THERAPY RXD/TAKEN: CPT | Mod: CPTII,S$GLB,, | Performed by: INTERNAL MEDICINE

## 2023-02-01 PROCEDURE — 1159F PR MEDICATION LIST DOCUMENTED IN MEDICAL RECORD: ICD-10-PCS | Mod: CPTII,S$GLB,, | Performed by: INTERNAL MEDICINE

## 2023-02-01 PROCEDURE — 4010F PR ACE/ARB THEARPY RXD/TAKEN: ICD-10-PCS | Mod: CPTII,S$GLB,, | Performed by: INTERNAL MEDICINE

## 2023-02-01 PROCEDURE — 93000 ELECTROCARDIOGRAM COMPLETE: CPT | Mod: S$GLB,,, | Performed by: INTERNAL MEDICINE

## 2023-02-01 PROCEDURE — 3078F PR MOST RECENT DIASTOLIC BLOOD PRESSURE < 80 MM HG: ICD-10-PCS | Mod: CPTII,S$GLB,, | Performed by: INTERNAL MEDICINE

## 2023-02-01 PROCEDURE — 3078F DIAST BP <80 MM HG: CPT | Mod: CPTII,S$GLB,, | Performed by: INTERNAL MEDICINE

## 2023-02-01 PROCEDURE — 3074F SYST BP LT 130 MM HG: CPT | Mod: CPTII,S$GLB,, | Performed by: INTERNAL MEDICINE

## 2023-02-01 PROCEDURE — 99214 PR OFFICE/OUTPT VISIT, EST, LEVL IV, 30-39 MIN: ICD-10-PCS | Mod: S$GLB,,, | Performed by: INTERNAL MEDICINE

## 2023-02-01 PROCEDURE — 3008F PR BODY MASS INDEX (BMI) DOCUMENTED: ICD-10-PCS | Mod: CPTII,S$GLB,, | Performed by: INTERNAL MEDICINE

## 2023-02-01 PROCEDURE — 3074F PR MOST RECENT SYSTOLIC BLOOD PRESSURE < 130 MM HG: ICD-10-PCS | Mod: CPTII,S$GLB,, | Performed by: INTERNAL MEDICINE

## 2023-02-01 RX ORDER — CELECOXIB 200 MG/1
CAPSULE ORAL 2 TIMES DAILY
COMMUNITY
Start: 2022-12-06

## 2023-02-01 NOTE — PROGRESS NOTES
"Subjective:    Patient ID:  Edel Edwards III is a 67 y.o. male who presents for follow-up of No chief complaint on file.      HPI    HTN, DM, CAD - TESSA to LAD 12/20/16 7/6/18 [facility] ED 6, LAD stent widely patent, D1  Osteal 90% - "stent [facility]" - medium vessel, Cx distal 70% - small distal vessel, RCA medium size with diffuse mid 70%  Reviewed with Dr Whaley  Continue with medical Rx     12/20/16 [facility] ED 17, no LV, LAD tandem proximal 90% lesions, Cx distal 70%, RCA medium with diffuse mid 80%        Description of the findings of the procedure: calcified, fibrotic 90%prox LAD --> 0% by predil with 3.5 angioscore and NC balloon. Placement of a 3.5 x 30 mm TESSA.     He continued to have mostly fatigue symptoms following stent     Echo 10/27/21  The left ventricle is normal in size with concentric remodeling and normal systolic function.  The estimated ejection fraction is 60%.  Indeterminate left ventricular diastolic function.  Mild left atrial enlargement.  Normal right ventricular size with normal right ventricular systolic function.  Mild right atrial enlargement.  Mild pulmonic regurgitation.  Normal central venous pressure (3 mmHg).  The estimated PA systolic pressure is 29 mmHg.  There is no pulmonary hypertension.     Stress test 10/27/21    Normal myocardial perfusion scan. There is no evidence of myocardial ischemia or infarction.    There is a  moderate to severe intensity fixed defect in the inferior wall of the left ventricle secondary to diaphragm attenuation.    The gated perfusion images showed an ejection fraction of 74% post stress.    There is normal wall motion post stress.    The EKG portion of this study is negative for ischemia.    The patient reported no chest pain during the stress test.    There were no arrhythmias during stress.        5/23/17 Reviewed cath film and stress test with Dr Fried - could be a candidate for PCI of RCA although this is a medium sized diffusely diseased " vessel. Given improved symptoms will continue to treat medically for now     7/24/18 Did well after [facility]  Denies CP  Had mild stable OLIVIER     10/29/18 Not exercising much  Denies CP  Stable OLIVIER  EKG NSR - ok     4/9/19 Denies CP or SOB  Still not exercising much  EKG NSR - ok     8/13/21 Mild OLIVIER and occasional sharp CP  EKG NSR PRWP   Continue Rx for CAD, HTN, HLD, DM  OV 3 months with echo and lexiscan myoview for CP and OLIVIER - says he can't walk treadmill due to joint issues     8/2/22 Denies CP or SOB.  Hytrin apparently no longer being manufactured  BP controlled  EKG NSR PRWP  Observe BP off of hytrin - if not controlled consider addin norvasc  Continue Rx for CAD, HTN, HLD, DM  OV 6 months    2/1/23 Denies CP or SOB  BP controlled  EKg NSR - ok    Review of Systems   Constitutional: Negative for decreased appetite.   HENT:  Negative for ear discharge.    Eyes:  Negative for blurred vision.   Endocrine: Negative for polyphagia.   Skin:  Negative for nail changes.   Genitourinary:  Negative for bladder incontinence.   Neurological:  Negative for aphonia.   Psychiatric/Behavioral:  Negative for hallucinations.    Allergic/Immunologic: Negative for hives.      Objective:    Physical Exam  Constitutional:       Appearance: He is well-developed.   HENT:      Head: Normocephalic and atraumatic.   Eyes:      Conjunctiva/sclera: Conjunctivae normal.      Pupils: Pupils are equal, round, and reactive to light.   Cardiovascular:      Rate and Rhythm: Normal rate.      Pulses: Intact distal pulses.      Heart sounds: Normal heart sounds.   Pulmonary:      Effort: Pulmonary effort is normal.      Breath sounds: Normal breath sounds.   Abdominal:      General: Bowel sounds are normal.      Palpations: Abdomen is soft.   Musculoskeletal:         General: Normal range of motion.      Cervical back: Normal range of motion and neck supple.   Skin:     General: Skin is warm and dry.   Neurological:      Mental Status: He is alert  and oriented to person, place, and time.         Assessment:       1. Primary hypertension    2. OLIVIER (dyspnea on exertion)    3. Coronary artery disease involving native coronary artery of native heart, unspecified whether angina present    4. Hyperlipidemia, unspecified hyperlipidemia type    5. Chest pain, atypical         Plan:       Continue Rx for CAD, HTN, HLD, DM  OV 6 months with echo and lexiscan myoview for CAD

## 2023-02-13 ENCOUNTER — TELEPHONE (OUTPATIENT)
Dept: ADMINISTRATIVE | Facility: CLINIC | Age: 68
End: 2023-02-13
Payer: MEDICARE

## 2023-02-13 ENCOUNTER — PATIENT MESSAGE (OUTPATIENT)
Dept: ADMINISTRATIVE | Facility: CLINIC | Age: 68
End: 2023-02-13
Payer: MEDICARE

## 2023-02-13 NOTE — TELEPHONE ENCOUNTER
Called pt; no answer; left message informing patient I was calling to confirm his virtual EAWV on 2/15/23 at 9:00am and to see if he needed any help with setting up for virtual appt or e-pre check and to login 15 minutes prior to scheduled appt; left my name & number for pt to return my call if he has any questions or concerns; sent message through portal

## 2023-02-15 ENCOUNTER — TELEPHONE (OUTPATIENT)
Dept: ADMINISTRATIVE | Facility: CLINIC | Age: 68
End: 2023-02-15
Payer: MEDICARE

## 2023-02-15 NOTE — TELEPHONE ENCOUNTER
Called pt; informed pt I was just making a reminder call for his virtual visit today at 9:00am and to see if he needed any help; pt stated he would like to cancel the appt; offered to reschedule to a later date and time and pt stated he would like to decline awv appt and does not need it; pt does not want a call back in regards to awv appt; appt canceled per pt request

## 2023-04-28 ENCOUNTER — PATIENT MESSAGE (OUTPATIENT)
Dept: CARDIOLOGY | Facility: CLINIC | Age: 68
End: 2023-04-28
Payer: MEDICARE

## 2023-06-26 ENCOUNTER — PATIENT MESSAGE (OUTPATIENT)
Dept: OPTOMETRY | Facility: CLINIC | Age: 68
End: 2023-06-26
Payer: MEDICARE

## 2023-08-07 ENCOUNTER — TELEPHONE (OUTPATIENT)
Dept: CARDIOLOGY | Facility: CLINIC | Age: 68
End: 2023-08-07
Payer: MEDICARE

## 2023-08-07 NOTE — TELEPHONE ENCOUNTER
Lm for pt to inform of scheduled nuclear test and time. Advised patient to call back with any questions.

## 2023-08-08 ENCOUNTER — HOSPITAL ENCOUNTER (OUTPATIENT)
Dept: RADIOLOGY | Facility: HOSPITAL | Age: 68
Discharge: HOME | End: 2023-08-08
Attending: INTERNAL MEDICINE
Payer: MEDICARE

## 2023-08-08 ENCOUNTER — HOSPITAL ENCOUNTER (OUTPATIENT)
Dept: CARDIOLOGY | Facility: HOSPITAL | Age: 68
Discharge: HOME | End: 2023-08-08
Attending: INTERNAL MEDICINE
Payer: MEDICARE

## 2023-08-08 DIAGNOSIS — R07.89 CHEST PAIN, ATYPICAL: ICD-10-CM

## 2023-08-08 DIAGNOSIS — R06.09 DOE (DYSPNEA ON EXERTION): ICD-10-CM

## 2023-08-08 DIAGNOSIS — I10 PRIMARY HYPERTENSION: ICD-10-CM

## 2023-08-08 DIAGNOSIS — E78.5 HYPERLIPIDEMIA, UNSPECIFIED HYPERLIPIDEMIA TYPE: ICD-10-CM

## 2023-08-08 DIAGNOSIS — I25.10 CORONARY ARTERY DISEASE INVOLVING NATIVE CORONARY ARTERY OF NATIVE HEART, UNSPECIFIED WHETHER ANGINA PRESENT: ICD-10-CM

## 2023-08-08 LAB
ASCENDING AORTA: 3.35 CM
AV INDEX (PROSTH): 0.84
AV MEAN GRADIENT: 4 MMHG
AV PEAK GRADIENT: 7 MMHG
AV VALVE AREA BY VELOCITY RATIO: 2.98 CM²
AV VALVE AREA: 3.19 CM²
AV VELOCITY RATIO: 0.78
CV ECHO LV RWT: 0.46 CM
CV STRESS BASE HR: 64 BPM
DIASTOLIC BLOOD PRESSURE: 67 MMHG
DOP CALC AO PEAK VEL: 1.3 M/S
DOP CALC AO VTI: 24.4 CM
DOP CALC LVOT AREA: 3.8 CM2
DOP CALC LVOT DIAMETER: 2.2 CM
DOP CALC LVOT PEAK VEL: 1.02 M/S
DOP CALC LVOT STROKE VOLUME: 77.89 CM3
DOP CALC MV VTI: 19.2 CM
DOP CALCLVOT PEAK VEL VTI: 20.5 CM
E WAVE DECELERATION TIME: 291.19 MSEC
E/A RATIO: 0.63
E/E' RATIO: 4.73 M/S
ECHO LV POSTERIOR WALL: 1.13 CM (ref 0.6–1.1)
FRACTIONAL SHORTENING: 31 % (ref 28–44)
INTERVENTRICULAR SEPTUM: 1.28 CM (ref 0.6–1.1)
IVC DIAMETER: 1.6 CM
IVRT: 65.65 MSEC
LA MAJOR: 5.36 CM
LA MINOR: 6.33 CM
LA WIDTH: 4.1 CM
LEFT ATRIUM SIZE: 4.93 CM
LEFT ATRIUM VOLUME: 99.73 CM3
LEFT INTERNAL DIMENSION IN SYSTOLE: 3.37 CM (ref 2.1–4)
LEFT VENTRICLE DIASTOLIC VOLUME: 113.43 ML
LEFT VENTRICLE SYSTOLIC VOLUME: 46.47 ML
LEFT VENTRICULAR INTERNAL DIMENSION IN DIASTOLE: 4.91 CM (ref 3.5–6)
LEFT VENTRICULAR MASS: 228.45 G
LV LATERAL E/E' RATIO: 4 M/S
LV SEPTAL E/E' RATIO: 5.78 M/S
LVOT MG: 2.3 MMHG
LVOT MV: 0.71 CM/S
MV MEAN GRADIENT: 1 MMHG
MV PEAK A VEL: 0.82 M/S
MV PEAK E VEL: 0.52 M/S
MV PEAK GRADIENT: 2 MMHG
MV STENOSIS PRESSURE HALF TIME: 84.45 MS
MV VALVE AREA BY CONTINUITY EQUATION: 4.06 CM2
MV VALVE AREA P 1/2 METHOD: 2.61 CM2
NUC REST EJECTION FRACTION: 77
OHS CV CPX 85 PERCENT MAX PREDICTED HEART RATE MALE: 128
OHS CV CPX MAX PREDICTED HEART RATE: 151
OHS CV CPX PATIENT IS FEMALE: 0
OHS CV CPX PATIENT IS MALE: 1
OHS CV CPX PEAK DIASTOLIC BLOOD PRESSURE: 60 MMHG
OHS CV CPX PEAK HEAR RATE: 84 BPM
OHS CV CPX PEAK RATE PRESSURE PRODUCT: 9828
OHS CV CPX PEAK SYSTOLIC BLOOD PRESSURE: 117 MMHG
OHS CV CPX PERCENT MAX PREDICTED HEART RATE ACHIEVED: 56
OHS CV CPX RATE PRESSURE PRODUCT PRESENTING: 6528
PISA TR MAX VEL: 2.15 M/S
PV PEAK GRADIENT: 5 MMHG
PV PEAK VELOCITY: 1.14 M/S
RA MAJOR: 5.3 CM
RA PRESSURE ESTIMATED: 3 MMHG
RA WIDTH: 3.7 CM
RIGHT VENTRICULAR END-DIASTOLIC DIMENSION: 4.03 CM
RV TB RVSP: 5 MMHG
RV TISSUE DOPPLER FREE WALL SYSTOLIC VELOCITY 1 (APICAL 4 CHAMBER VIEW): 14.08 CM/S
SINUS: 3.59 CM
STJ: 2.61 CM
SYSTOLIC BLOOD PRESSURE: 102 MMHG
TDI LATERAL: 0.13 M/S
TDI SEPTAL: 0.09 M/S
TDI: 0.11 M/S
TR MAX PG: 18 MMHG
TRICUSPID ANNULAR PLANE SYSTOLIC EXCURSION: 2.05 CM
TV REST PULMONARY ARTERY PRESSURE: 21 MMHG

## 2023-08-08 PROCEDURE — 93016 NUCLEAR STRESS - CARDIOLOGY INTERPRETED (CUPID ONLY): ICD-10-PCS | Mod: ,,, | Performed by: INTERNAL MEDICINE

## 2023-08-08 PROCEDURE — 93018 NUCLEAR STRESS - CARDIOLOGY INTERPRETED (CUPID ONLY): ICD-10-PCS | Mod: ,,, | Performed by: INTERNAL MEDICINE

## 2023-08-08 PROCEDURE — 93306 TTE W/DOPPLER COMPLETE: CPT | Mod: 26,,, | Performed by: INTERNAL MEDICINE

## 2023-08-08 PROCEDURE — 93016 CV STRESS TEST SUPVJ ONLY: CPT | Mod: ,,, | Performed by: INTERNAL MEDICINE

## 2023-08-08 PROCEDURE — 78452 NUCLEAR STRESS - CARDIOLOGY INTERPRETED (CUPID ONLY): ICD-10-PCS | Mod: 26,,, | Performed by: INTERNAL MEDICINE

## 2023-08-08 PROCEDURE — 93306 ECHO (CUPID ONLY): ICD-10-PCS | Mod: 26,,, | Performed by: INTERNAL MEDICINE

## 2023-08-08 PROCEDURE — 78452 HT MUSCLE IMAGE SPECT MULT: CPT

## 2023-08-08 PROCEDURE — 63600175 PHARM REV CODE 636 W HCPCS: Performed by: INTERNAL MEDICINE

## 2023-08-08 PROCEDURE — 78452 HT MUSCLE IMAGE SPECT MULT: CPT | Mod: 26,,, | Performed by: INTERNAL MEDICINE

## 2023-08-08 PROCEDURE — 93306 TTE W/DOPPLER COMPLETE: CPT

## 2023-08-08 PROCEDURE — 93017 CV STRESS TEST TRACING ONLY: CPT

## 2023-08-08 PROCEDURE — 93018 CV STRESS TEST I&R ONLY: CPT | Mod: ,,, | Performed by: INTERNAL MEDICINE

## 2023-08-08 RX ORDER — REGADENOSON 0.08 MG/ML
0.4 INJECTION, SOLUTION INTRAVENOUS ONCE
Status: COMPLETED | OUTPATIENT
Start: 2023-08-08 | End: 2023-08-08

## 2023-08-08 RX ADMIN — REGADENOSON 0.4 MG: 0.08 INJECTION, SOLUTION INTRAVENOUS at 09:08

## 2023-08-10 ENCOUNTER — OFFICE VISIT (OUTPATIENT)
Dept: CARDIOLOGY | Facility: CLINIC | Age: 68
End: 2023-08-10
Payer: MEDICARE

## 2023-08-10 VITALS
SYSTOLIC BLOOD PRESSURE: 110 MMHG | HEIGHT: 72 IN | HEART RATE: 73 BPM | RESPIRATION RATE: 18 BRPM | BODY MASS INDEX: 29.38 KG/M2 | WEIGHT: 216.94 LBS | OXYGEN SATURATION: 96 % | DIASTOLIC BLOOD PRESSURE: 65 MMHG

## 2023-08-10 DIAGNOSIS — R07.89 CHEST PAIN, ATYPICAL: ICD-10-CM

## 2023-08-10 DIAGNOSIS — I25.10 CORONARY ARTERY DISEASE INVOLVING NATIVE CORONARY ARTERY OF NATIVE HEART, UNSPECIFIED WHETHER ANGINA PRESENT: Primary | ICD-10-CM

## 2023-08-10 DIAGNOSIS — I10 PRIMARY HYPERTENSION: ICD-10-CM

## 2023-08-10 DIAGNOSIS — E78.5 HYPERLIPIDEMIA, UNSPECIFIED HYPERLIPIDEMIA TYPE: ICD-10-CM

## 2023-08-10 DIAGNOSIS — R06.09 DOE (DYSPNEA ON EXERTION): ICD-10-CM

## 2023-08-10 DIAGNOSIS — E11.41 TYPE 2 DIABETES MELLITUS WITH DIABETIC MONONEUROPATHY, WITHOUT LONG-TERM CURRENT USE OF INSULIN: ICD-10-CM

## 2023-08-10 PROCEDURE — 3078F DIAST BP <80 MM HG: CPT | Mod: CPTII,S$GLB,, | Performed by: INTERNAL MEDICINE

## 2023-08-10 PROCEDURE — 1126F AMNT PAIN NOTED NONE PRSNT: CPT | Mod: CPTII,S$GLB,, | Performed by: INTERNAL MEDICINE

## 2023-08-10 PROCEDURE — 4010F PR ACE/ARB THEARPY RXD/TAKEN: ICD-10-PCS | Mod: CPTII,S$GLB,, | Performed by: INTERNAL MEDICINE

## 2023-08-10 PROCEDURE — 1101F PT FALLS ASSESS-DOCD LE1/YR: CPT | Mod: CPTII,S$GLB,, | Performed by: INTERNAL MEDICINE

## 2023-08-10 PROCEDURE — 3008F PR BODY MASS INDEX (BMI) DOCUMENTED: ICD-10-PCS | Mod: CPTII,S$GLB,, | Performed by: INTERNAL MEDICINE

## 2023-08-10 PROCEDURE — 99999 PR PBB SHADOW E&M-EST. PATIENT-LVL V: CPT | Mod: PBBFAC,,, | Performed by: INTERNAL MEDICINE

## 2023-08-10 PROCEDURE — 99214 OFFICE O/P EST MOD 30 MIN: CPT | Mod: S$GLB,,, | Performed by: INTERNAL MEDICINE

## 2023-08-10 PROCEDURE — 3008F BODY MASS INDEX DOCD: CPT | Mod: CPTII,S$GLB,, | Performed by: INTERNAL MEDICINE

## 2023-08-10 PROCEDURE — 1159F PR MEDICATION LIST DOCUMENTED IN MEDICAL RECORD: ICD-10-PCS | Mod: CPTII,S$GLB,, | Performed by: INTERNAL MEDICINE

## 2023-08-10 PROCEDURE — 1159F MED LIST DOCD IN RCRD: CPT | Mod: CPTII,S$GLB,, | Performed by: INTERNAL MEDICINE

## 2023-08-10 PROCEDURE — 3078F PR MOST RECENT DIASTOLIC BLOOD PRESSURE < 80 MM HG: ICD-10-PCS | Mod: CPTII,S$GLB,, | Performed by: INTERNAL MEDICINE

## 2023-08-10 PROCEDURE — 4010F ACE/ARB THERAPY RXD/TAKEN: CPT | Mod: CPTII,S$GLB,, | Performed by: INTERNAL MEDICINE

## 2023-08-10 PROCEDURE — 3288F PR FALLS RISK ASSESSMENT DOCUMENTED: ICD-10-PCS | Mod: CPTII,S$GLB,, | Performed by: INTERNAL MEDICINE

## 2023-08-10 PROCEDURE — 1101F PR PT FALLS ASSESS DOC 0-1 FALLS W/OUT INJ PAST YR: ICD-10-PCS | Mod: CPTII,S$GLB,, | Performed by: INTERNAL MEDICINE

## 2023-08-10 PROCEDURE — 99999 PR PBB SHADOW E&M-EST. PATIENT-LVL V: ICD-10-PCS | Mod: PBBFAC,,, | Performed by: INTERNAL MEDICINE

## 2023-08-10 PROCEDURE — 3074F PR MOST RECENT SYSTOLIC BLOOD PRESSURE < 130 MM HG: ICD-10-PCS | Mod: CPTII,S$GLB,, | Performed by: INTERNAL MEDICINE

## 2023-08-10 PROCEDURE — 3074F SYST BP LT 130 MM HG: CPT | Mod: CPTII,S$GLB,, | Performed by: INTERNAL MEDICINE

## 2023-08-10 PROCEDURE — 99214 PR OFFICE/OUTPT VISIT, EST, LEVL IV, 30-39 MIN: ICD-10-PCS | Mod: S$GLB,,, | Performed by: INTERNAL MEDICINE

## 2023-08-10 PROCEDURE — 1126F PR PAIN SEVERITY QUANTIFIED, NO PAIN PRESENT: ICD-10-PCS | Mod: CPTII,S$GLB,, | Performed by: INTERNAL MEDICINE

## 2023-08-10 PROCEDURE — 3288F FALL RISK ASSESSMENT DOCD: CPT | Mod: CPTII,S$GLB,, | Performed by: INTERNAL MEDICINE

## 2023-08-10 NOTE — PROGRESS NOTES
"Subjective:   Patient ID:  Edel Edwards III is a 68 y.o. male who presents for follow-up of No chief complaint on file.      HPI    HTN, DM, CAD - TESSA to LAD 12/20/16 7/6/18 [facility] ED 6, LAD stent widely patent, D1  Osteal 90% - "stent [facility]" - medium vessel, Cx distal 70% - small distal vessel, RCA medium size with diffuse mid 70%  Reviewed with Dr Whaley  Continue with medical Rx     12/20/16 [facility] ED 17, no LV, LAD tandem proximal 90% lesions, Cx distal 70%, RCA medium with diffuse mid 80%        Description of the findings of the procedure: calcified, fibrotic 90%prox LAD --> 0% by predil with 3.5 angioscore and NC balloon. Placement of a 3.5 x 30 mm TESSA.     He continued to have mostly fatigue symptoms following stent     Echo 8/8/23    Left Ventricle: The left ventricle is normal in size. Mildly increased wall thickness. Normal wall motion. There is normal systolic function with a visually estimated ejection fraction of 60 - 65%. Grade I diastolic dysfunction.    Left Atrium: Left atrium is moderately dilated.    Right Ventricle: Normal right ventricular cavity size. Systolic function is normal.    Pulmonary Artery: The estimated pulmonary artery systolic pressure is 21 mmHg.    IVC/SVC: Normal venous pressure at 3 mmHg.     Stress test 8/8/23    Abnormal myocardial perfusion scan.    There is a moderate to severe intensity, moderate to large sized, mostly fixed perfusion abnormality with some reversibilty in the inferior wall(s).    There are no other significant perfusion abnormalities.    The gated perfusion images showed an ejection fraction of 77% at rest.    There is normal wall motion at rest and post stress.    The ECG portion of the study is negative for ischemia.    The patient reported no chest pain during the stress test.    There were no arrhythmias during stress.  Similar to images 10/27/21     5/23/17 Reviewed cath film and stress test with Dr Fried - could be a candidate for PCI of " RCA although this is a medium sized diffusely diseased vessel. Given improved symptoms will continue to treat medically for now     7/24/18 Did well after [facility]  Denies CP  Had mild stable OLIVIER     10/29/18 Not exercising much  Denies CP  Stable OLIVIER  EKG NSR - ok     4/9/19 Denies CP or SOB  Still not exercising much  EKG NSR - ok     8/13/21 Mild OLIVIER and occasional sharp CP  EKG NSR PRWP   Continue Rx for CAD, HTN, HLD, DM  OV 3 months with echo and lexiscan myoview for CP and OLIVIER - says he can't walk treadmill due to joint issues     8/2/22 Denies CP or SOB.  Hytrin apparently no longer being manufactured  BP controlled  EKG NSR PRWP  Observe BP off of hytrin - if not controlled consider addin norvasc  Continue Rx for CAD, HTN, HLD, DM  OV 6 months     2/1/23 Denies CP or SOB  BP controlled  EKG NSR - ok  Continue Rx for CAD, HTN, HLD, DM  OV 6 months with echo and lexiscan myoview for CAD      8/10/23 Denies CP or SOB  BP controlled  Doesn't exercise much    Review of Systems   Constitutional: Negative for decreased appetite.   HENT:  Negative for ear discharge.    Eyes:  Negative for blurred vision.   Endocrine: Negative for polyphagia.   Skin:  Negative for nail changes.   Genitourinary:  Negative for bladder incontinence.   Neurological:  Negative for aphonia.   Psychiatric/Behavioral:  Negative for hallucinations.    Allergic/Immunologic: Negative for hives.       Objective:   Physical Exam  Constitutional:       Appearance: He is well-developed.   HENT:      Head: Normocephalic and atraumatic.   Eyes:      Conjunctiva/sclera: Conjunctivae normal.      Pupils: Pupils are equal, round, and reactive to light.   Cardiovascular:      Rate and Rhythm: Normal rate.      Pulses: Intact distal pulses.      Heart sounds: Normal heart sounds.   Pulmonary:      Effort: Pulmonary effort is normal.      Breath sounds: Normal breath sounds.   Abdominal:      General: Bowel sounds are normal.      Palpations: Abdomen is soft.    Musculoskeletal:         General: Normal range of motion.      Cervical back: Normal range of motion and neck supple.   Skin:     General: Skin is warm and dry.   Neurological:      Mental Status: He is alert and oriented to person, place, and time.         Assessment:      1. Coronary artery disease involving native coronary artery of native heart, unspecified whether angina present    2. OLIVIER (dyspnea on exertion)    3. Primary hypertension    4. Hyperlipidemia, unspecified hyperlipidemia type    5. Type 2 diabetes mellitus with diabetic mononeuropathy, without long-term current use of insulin    6. Chest pain, atypical        Plan:     Stable fixed inferior defect on stress test, EF 60-65%  Continue Rx for CAD, HTN, HLD, DM  OV 6 months

## 2023-08-23 DIAGNOSIS — N40.0 BPH WITHOUT OBSTRUCTION/LOWER URINARY TRACT SYMPTOMS: ICD-10-CM

## 2023-08-23 RX ORDER — TAMSULOSIN HYDROCHLORIDE 0.4 MG/1
1 CAPSULE ORAL
Qty: 90 CAPSULE | Refills: 2 | Status: SHIPPED | OUTPATIENT
Start: 2023-08-23 | End: 2024-03-11

## 2023-08-28 NOTE — DISCHARGE SUMMARY
[facility] [place]  Cardiology  Discharge Summary      Patient Name: Edel Edwards III  MRN: [id number]  Admission Date: 7/6/2018  Hospital Length of Stay: 0 days  Discharge Date and Time:  07/06/2018 10:48 AM  Attending Physician: Teja Eaton MD    Discharging Provider: Teja Eaton MD  Primary Care Physician: Chandana Varela MD    HPI:   HTN, DM, CAD - TESSA to LAD 12/20/16 12/20/16 [facility] - EDP 17, no LV, LAD tandem proximal 90% lesions, Cx distal 70%, RCA medium with diffuse mid 80%        Description of the findings of the procedure: calcified, fibrotic 90%prox LAD --> 0% by predil with 3.5 angioscore and NC balloon. Placement of a 3.5 x 30 mm TESSA.     He continued to have mostly fatigue symptoms following stent     Echo 6/27/18    1 - Low normal to mildly depressed left ventricular systolic function (EF 50-55%).     2 - No wall motion abnormalities.     3 - Concentric remodeling.     4 - Trivial mitral regurgitation.     5 - Trivial tricuspid regurgitation.     6 - The estimated PA systolic pressure is 32 mmHg.      Stress test 6/27/18  Impression: ABNORMAL MYOCARDIAL PERFUSION  1. There is evidence for mild myocardial ischemia in the anterolateral wall of the left ventricle.   2. The perfusion scan is free of evidence for myocardial injury.   3. There is a moderate intensity fixed defect in the inferior wall of the left ventricle, secondary to diaphragm attenuation.   4. Resting wall motion is physiologic.   5. The ventricular volumes are normal at rest and stress.   6. The extracardiac distribution of radioactivity is normal.   7. When compared to the previous study from 02/14/2017, newly noted mild anterolateral ischemia with similar inferior attenuation artifact noted.  8. Ischemic EKG changes noted during ETT.     5/23/17 Reviewed cath film and stress test with Dr Fried - could be a candidate for PCI of RCA although this is a medium sized diffusely diseased vessel. Given improved  "symptoms will continue to treat medically for now     Mainly having fatigue  Occasional mild CP          Procedure(s) (LRB):  Left heart cath (Left)     Indwelling Lines/Drains at time of discharge:  Lines/Drains/Airways          No matching active lines, drains, or airways          Hospital Course:  7/6/18 [facility] - EDP 6, LAD stent widely patent, D1  Osteal 90% - "stent [facility]" - medium vessel, Cx distal 70% - small distal vessel, RCA medium size with diffuse mid 70%     Reviewed with Dr Whaley  Continue with medical Rx  Angioseal  Home today    Consults:     Significant Diagnostic Studies: Angiography:     Pending Diagnostic Studies:     None          Final Active Diagnoses:    Diagnosis Date Noted POA    CAD (coronary artery disease) [I25.10] 12/20/2016 Yes    Chest pain, atypical [R07.89] 12/05/2016 Yes    HTN (hypertension) [I10]  Yes    Diabetes mellitus type 2 in obese [E11.69, E66.9]  Yes    Hyperlipidemia [E78.5]  Yes      Problems Resolved During this Admission:    Diagnosis Date Noted Date Resolved POA     CAD (coronary artery disease)    7/6/18 [facility] - EDP 6, LAD stent widely patent, D1  Osteal 90% - "stent [facility]" - medium vessel, Cx distal 70% - small distal vessel, RCA medium size with diffuse mid 70%     Reviewed with Dr Whaley  Continue with medical Rx  Angioseal  Home today        Hyperlipidemia             HTN (hypertension)             Diabetes mellitus type 2 in obese     Resume metformin 7/9/18            Discharged Condition: good    Disposition:  Home    Follow Up: Dr Eaton 1 week    ADA diet  No heavy lifting for 4 days    Patient Instructions:     Diet general     Call MD for:  temperature >100.4     Call MD for:  severe uncontrolled pain     Call MD for:  difficulty breathing, headache or visual disturbances     Call MD for:  redness, tenderness, or signs of infection (pain, swelling, redness, odor or green/yellow discharge around incision site)     Call MD for:  hives "       Medications:  Reconciled Home Medications:      Medication List      CONTINUE taking these medications    ACCU-CHEK SMARTVIEW TEST STRIP Strp  Generic drug:  blood sugar diagnostic  as needed.     aspirin 81 MG EC tablet  Commonly known as:  ECOTRIN  Take 1 tablet (81 mg total) by mouth once daily.     atorvastatin 40 MG tablet  Commonly known as:  LIPITOR  take 1/2 tablet by mouth once daily     cetirizine 10 MG tablet  Commonly known as:  ZYRTEC  Take 1 tablet (10 mg total) by mouth once daily.     clopidogrel 75 mg tablet  Commonly known as:  PLAVIX  Take 1 tablet (75 mg total) by mouth once daily.     EDARBI ORAL  Take by mouth once daily.     etodolac 500 MG tablet  Commonly known as:  LODINE  take 1 tablet by mouth twice a day     FARXIGA ORAL  Take by mouth once daily.     gabapentin 100 MG capsule  Commonly known as:  NEURONTIN  Take 400 mg by mouth every evening.     isosorbide mononitrate 30 MG 24 hr tablet  Commonly known as:  IMDUR  take 1 tablet by mouth once daily     metFORMIN 1000 MG tablet  Commonly known as:  GLUCOPHAGE  Take 1,000 mg by mouth 2 (two) times daily with meals.     metoprolol succinate 25 MG 24 hr tablet  Commonly known as:  TOPROL-XL  take 1 tablet by mouth once daily     tamsulosin 0.4 mg Cp24  Commonly known as:  FLOMAX  Take 1 capsule (0.4 mg total) by mouth once daily.     terazosin 2 MG capsule  Commonly known as:  HYTRIN  Take 2 mg by mouth every evening.     TRULICITY 0.75 mg/0.5 mL Pnij  Generic drug:  dulaglutide  weekly            Time spent on the discharge of patient: 30 minutes    Teja Eaton MD  Cardiology  [facility]   Repair Anesthesia Method: local infiltration

## 2023-10-03 ENCOUNTER — LAB VISIT (OUTPATIENT)
Dept: LAB | Facility: HOSPITAL | Age: 68
End: 2023-10-03
Attending: UROLOGY
Payer: MEDICARE

## 2023-10-03 ENCOUNTER — OFFICE VISIT (OUTPATIENT)
Dept: UROLOGY | Facility: CLINIC | Age: 68
End: 2023-10-03
Payer: MEDICARE

## 2023-10-03 VITALS — WEIGHT: 215.94 LBS | BODY MASS INDEX: 29.29 KG/M2

## 2023-10-03 DIAGNOSIS — R97.20 ELEVATED PSA: Primary | ICD-10-CM

## 2023-10-03 DIAGNOSIS — N52.9 ED (ERECTILE DYSFUNCTION) OF ORGANIC ORIGIN: ICD-10-CM

## 2023-10-03 DIAGNOSIS — R97.20 ELEVATED PSA: ICD-10-CM

## 2023-10-03 DIAGNOSIS — N40.0 BPH WITHOUT OBSTRUCTION/LOWER URINARY TRACT SYMPTOMS: ICD-10-CM

## 2023-10-03 DIAGNOSIS — R35.1 NOCTURIA MORE THAN TWICE PER NIGHT: ICD-10-CM

## 2023-10-03 LAB — COMPLEXED PSA SERPL-MCNC: 7.3 NG/ML (ref 0–4)

## 2023-10-03 PROCEDURE — 99214 OFFICE O/P EST MOD 30 MIN: CPT | Mod: S$GLB,,, | Performed by: UROLOGY

## 2023-10-03 PROCEDURE — 1159F MED LIST DOCD IN RCRD: CPT | Mod: CPTII,S$GLB,, | Performed by: UROLOGY

## 2023-10-03 PROCEDURE — 81001 PR  URINALYSIS, AUTO, W/SCOPE: ICD-10-PCS | Mod: S$GLB,,, | Performed by: UROLOGY

## 2023-10-03 PROCEDURE — 1160F PR REVIEW ALL MEDS BY PRESCRIBER/CLIN PHARMACIST DOCUMENTED: ICD-10-PCS | Mod: CPTII,S$GLB,, | Performed by: UROLOGY

## 2023-10-03 PROCEDURE — 1101F PT FALLS ASSESS-DOCD LE1/YR: CPT | Mod: CPTII,S$GLB,, | Performed by: UROLOGY

## 2023-10-03 PROCEDURE — 81001 URINALYSIS AUTO W/SCOPE: CPT | Mod: S$GLB,,, | Performed by: UROLOGY

## 2023-10-03 PROCEDURE — 1159F PR MEDICATION LIST DOCUMENTED IN MEDICAL RECORD: ICD-10-PCS | Mod: CPTII,S$GLB,, | Performed by: UROLOGY

## 2023-10-03 PROCEDURE — 4010F ACE/ARB THERAPY RXD/TAKEN: CPT | Mod: CPTII,S$GLB,, | Performed by: UROLOGY

## 2023-10-03 PROCEDURE — 99999 PR PBB SHADOW E&M-EST. PATIENT-LVL IV: CPT | Mod: PBBFAC,,, | Performed by: UROLOGY

## 2023-10-03 PROCEDURE — 1160F RVW MEDS BY RX/DR IN RCRD: CPT | Mod: CPTII,S$GLB,, | Performed by: UROLOGY

## 2023-10-03 PROCEDURE — 36415 COLL VENOUS BLD VENIPUNCTURE: CPT | Performed by: UROLOGY

## 2023-10-03 PROCEDURE — 84153 ASSAY OF PSA TOTAL: CPT | Performed by: UROLOGY

## 2023-10-03 PROCEDURE — 4010F PR ACE/ARB THEARPY RXD/TAKEN: ICD-10-PCS | Mod: CPTII,S$GLB,, | Performed by: UROLOGY

## 2023-10-03 PROCEDURE — 3008F PR BODY MASS INDEX (BMI) DOCUMENTED: ICD-10-PCS | Mod: CPTII,S$GLB,, | Performed by: UROLOGY

## 2023-10-03 PROCEDURE — 1101F PR PT FALLS ASSESS DOC 0-1 FALLS W/OUT INJ PAST YR: ICD-10-PCS | Mod: CPTII,S$GLB,, | Performed by: UROLOGY

## 2023-10-03 PROCEDURE — 1126F PR PAIN SEVERITY QUANTIFIED, NO PAIN PRESENT: ICD-10-PCS | Mod: CPTII,S$GLB,, | Performed by: UROLOGY

## 2023-10-03 PROCEDURE — 3288F FALL RISK ASSESSMENT DOCD: CPT | Mod: CPTII,S$GLB,, | Performed by: UROLOGY

## 2023-10-03 PROCEDURE — 99999 PR PBB SHADOW E&M-EST. PATIENT-LVL IV: ICD-10-PCS | Mod: PBBFAC,,, | Performed by: UROLOGY

## 2023-10-03 PROCEDURE — 3008F BODY MASS INDEX DOCD: CPT | Mod: CPTII,S$GLB,, | Performed by: UROLOGY

## 2023-10-03 PROCEDURE — 3288F PR FALLS RISK ASSESSMENT DOCUMENTED: ICD-10-PCS | Mod: CPTII,S$GLB,, | Performed by: UROLOGY

## 2023-10-03 PROCEDURE — 99214 PR OFFICE/OUTPT VISIT, EST, LEVL IV, 30-39 MIN: ICD-10-PCS | Mod: S$GLB,,, | Performed by: UROLOGY

## 2023-10-03 PROCEDURE — 1126F AMNT PAIN NOTED NONE PRSNT: CPT | Mod: CPTII,S$GLB,, | Performed by: UROLOGY

## 2023-10-03 NOTE — PROGRESS NOTES
Subjective:       Patient ID: Edel Edwards III is a 68 y.o. male The patient's last visit with me was on 9/13/2022.     Chief Complaint:   No chief complaint on file.        Elevated PSA  Patient is here with an elevated PSA. He has no personal history and no family history of prostate cancer.  He has a prior genitourinary history of erectile dysfunction, and previous biopsies a few years ago with Dr. Umanzor.    Component Ref Range & Units 4 d ago 3 yr ago   PSA Diagnostic 0.00 - 4.00 ng/mL 8.1 High   6.3 High  CM    Comment: The testing method is a chemiluminescent microparticle immunoassay   manufactured by [employer] and performed on the [employer]   or   [employer] system. Values obtained with different assay manufacturers   for   methods may be different and cannot be used interchangeably.   PSA Expected levels:   Hormonal Therapy: <0.05 ng/ml   Prostatectomy: <0.01 ng/ml   Radiation Therapy: <1.00 ng/ml    Resulting Agency  [facility]             Narrative  Performed by: [facility]  1 year      Specimen Collected: 08/12/22 09:05 Last Resulted: 08/12/22 16:36           10/03/2023  He did not get his PSA drawn.      Benign Prostatic Hyperplasia  He patient reports nocturia two times a night. He denies straining, urgency and weak stream. The patient states symptoms are of moderate severity. Onset of symptoms was several years ago and was gradual in onset.  He has no personal history and no family history of prostate cancer. He reports a history of no complicating symptoms. He denies flank pain, gross hematuria, kidney stones and recurrent UTI.  He is currently taking Flomax.      Erectile Dysfunction  Patient complains of erectile dysfunction. Onset of dysfunction was several years ago and was gradual in onset.  Patient states the nature of difficulty is maintaining erection. Full erections occur with intercourse. Partial erections occur with intercourse. Libido is not affected. Risk factors for ED  include cardiovascular disease and diabetes mellitus. Patient denies history of pelvic radiation. Previous treatment of ED includes Levitra and Cialis.  He is no longer taking these after his recent cardiac stent placement on Imdur Rx.      ACTIVE MEDICAL ISSUES:  Patient Active Problem List   Diagnosis    Type 2 diabetes mellitus with neurologic complication, without long-term current use of insulin    HTN (hypertension)    Hyperlipidemia    Type 2 diabetes mellitus without retinopathy - Both Eyes    Nuclear sclerosis - Both Eyes    BMI 33.0-33.9,adult    Elevated PSA    Chest pain, atypical    CAD (coronary artery disease)    OLIVIER (dyspnea on exertion)    Refractive error    Wears contact lenses    Class 1 obesity due to excess calories with serious comorbidity in adult       ALLERGIES AND MEDICATIONS: updated and reviewed.  Review of patient's allergies indicates:  No Known Allergies  Current Outpatient Medications   Medication Sig    ACCU-CHEK SMARTVIEW TEST STRIP Strp as needed.     aspirin (ECOTRIN) 81 MG EC tablet Take 81 mg by mouth once daily.    atorvastatin (LIPITOR) 40 MG tablet TAKE 1/2 TABLET BY MOUTH EVERY DAY    celecoxib (CELEBREX) 200 MG capsule Take by mouth 2 (two) times daily.    clopidogreL (PLAVIX) 75 mg tablet Take 1 tablet (75 mg total) by mouth once daily.    diphth,pertus,acell,,tetanus (BOOSTRIX) 2.5-8-5 Lf-mcg-Lf/0.5mL Syrg injection Boostrix Tdap 2.5 Lf unit-8 mcg-5 Lf/0.5 mL intramuscular syringe   inject 0.5 milliliter intramuscularly    flu vac ts 2016-17,4 yr up,/PF (FLU VAC TS 2014-15,36MOS+,,PF,) 45 mcg (15 mcg x 3)/0.5 mL Flucelvax 8470-0377 (PF) 45 mcg (15 mcg x 3)/0.5 mL IM syringe   inject 0.5 milliliter intramuscularly    gabapentin (NEURONTIN) 800 MG tablet Take 0.5 tablets (400 mg total) by mouth once daily.    influenza (FLULAVAL QUADRIVALENT) 60 mcg (15 mcg x 4)/0.5 mL Syrg vaccine Fluarix Quad 3991-2597 (PF) 60 mcg (15 mcg x 4)/0.5 mL IM syringe   inject 0.5 milliliter  intramuscularly    isosorbide mononitrate (IMDUR) 30 MG 24 hr tablet Take 1 tablet (30 mg total) by mouth once daily.    JARDIANCE 10 mg tablet Take 10 mg by mouth once daily.    ketoconazole (NIZORAL) 2 % cream Apply topically 2 (two) times daily.    metformin (GLUCOPHAGE) 1000 MG tablet Take 1,000 mg by mouth 2 (two) times daily with meals.    metoprolol succinate (TOPROL-XL) 25 MG 24 hr tablet Take 1 tablet (25 mg total) by mouth once daily.    mometasone 0.1% (ELOCON) 0.1 % cream Apply topically once daily.    OZEMPIC 1 mg/dose (2 mg/1.5 mL) PnIj INJECT 1 MG WEEKLY    SHINGRIX, PF, 50 mcg/0.5 mL injection     tamsulosin (FLOMAX) 0.4 mg Cap Take 1 capsule by mouth once daily.    zoster vaccine live, PF, (ZOSTAVAX) 19,400 unit/0.65 mL injection Zostavax (PF) 19,400 unit/0.65 mL subcutaneous suspension   inject contents of 1 vial subcutaneously    azilsartan medoxomiL 80 mg Tab Take by mouth once daily.     cetirizine (ZYRTEC) 10 MG tablet Take 10 mg by mouth once daily.    clotrimazole-betamethasone 1-0.05% (LOTRISONE) cream Apply topically as needed.    etodolac (LODINE) 500 MG tablet Take 1 tablet (500 mg total) by mouth 2 (two) times daily.    methylPREDNISolone (MEDROL) 4 MG Tab methylprednisolone 4 mg tablet   Take 1 tablet every day by oral route with meals.     No current facility-administered medications for this visit.       Review of Systems   Constitutional:  Negative for activity change, fatigue, fever and unexpected weight change.   HENT:  Negative for congestion.    Eyes:  Negative for redness.   Respiratory:  Negative for chest tightness and shortness of breath.    Cardiovascular:  Negative for chest pain and leg swelling.   Gastrointestinal:  Negative for abdominal pain, constipation, diarrhea, nausea and vomiting.   Genitourinary:  Negative for dysuria, flank pain, frequency, hematuria, penile pain, penile swelling, scrotal swelling, testicular pain and urgency.   Musculoskeletal:  Negative for  arthralgias and back pain.   Neurological:  Negative for dizziness and light-headedness.   Psychiatric/Behavioral:  Negative for behavioral problems and confusion. The patient is not nervous/anxious.    All other systems reviewed and are negative.      Objective:      Vitals:    10/03/23 0935   Weight: 98 kg (215 lb 15.1 oz)         Physical Exam  Vitals and nursing note reviewed.   Constitutional:       Appearance: He is well-developed.   HENT:      Head: Normocephalic.   Eyes:      Conjunctiva/sclera: Conjunctivae normal.   Neck:      Thyroid: No thyromegaly.      Trachea: No tracheal deviation.   Cardiovascular:      Rate and Rhythm: Normal rate.      Heart sounds: Normal heart sounds.   Pulmonary:      Effort: Pulmonary effort is normal. No respiratory distress.      Breath sounds: Normal breath sounds. No wheezing.   Abdominal:      General: Bowel sounds are normal.      Palpations: Abdomen is soft.      Tenderness: There is no abdominal tenderness. There is no rebound.      Hernia: No hernia is present.   Musculoskeletal:         General: No tenderness. Normal range of motion.      Cervical back: Normal range of motion and neck supple.   Lymphadenopathy:      Cervical: No cervical adenopathy.   Skin:     General: Skin is warm and dry.      Findings: No erythema or rash.   Neurological:      Mental Status: He is alert and oriented to person, place, and time.   Psychiatric:         Behavior: Behavior normal.         Thought Content: Thought content normal.         Judgment: Judgment normal.         Urine dipstick shows negative for all components.  Micro exam: negative for WBC's or RBC's.    DIS MRI Prostate 8/30/2022  83 cc  PIRADS 2 in transition and peripheral zones.  Overall impression, BPH      Assessment:       1. Elevated PSA    2. BPH without obstruction/lower urinary tract symptoms    3. Nocturia more than twice per night    4. ED (erectile dysfunction) of organic origin              Plan:       1.  Elevated PSA  PSA now   PSA in a year    - Prostate Specific Antigen, Diagnostic; Future  - Prostate Specific Antigen, Diagnostic; Future    2. BPH without obstruction/lower urinary tract symptoms  Flomax    3. Nocturia more than twice per night  Limit evening fluids    4. ED (erectile dysfunction) of organic origin  Declined                 Follow up in about 1 year (around 10/3/2024) for Follow up Established, Review PSA.

## 2023-11-20 RX ORDER — ISOSORBIDE MONONITRATE 30 MG/1
30 TABLET, EXTENDED RELEASE ORAL DAILY
Qty: 90 TABLET | Refills: 3 | Status: SHIPPED | OUTPATIENT
Start: 2023-11-20

## 2023-11-20 RX ORDER — ISOSORBIDE MONONITRATE 30 MG/1
30 TABLET, EXTENDED RELEASE ORAL
Qty: 90 TABLET | Refills: 2 | OUTPATIENT
Start: 2023-11-20

## 2023-11-20 RX ORDER — METOPROLOL SUCCINATE 25 MG/1
25 TABLET, EXTENDED RELEASE ORAL DAILY
Qty: 90 TABLET | Refills: 2 | OUTPATIENT
Start: 2023-11-20

## 2023-11-20 RX ORDER — METOPROLOL SUCCINATE 25 MG/1
25 TABLET, EXTENDED RELEASE ORAL DAILY
Qty: 90 TABLET | Refills: 3 | Status: SHIPPED | OUTPATIENT
Start: 2023-11-20

## 2024-01-01 NOTE — PROGRESS NOTES
ELISE GARZA 04/2019  Diabetic  couple days ago.  Wears contacts vision and   comfort good.    Glasses at least 5 yrs. Old, mainly wears at night after   removing contacts.  Not using any drops.     Hemoglobin A1C       Date                     Value               Ref Range             Status                06/22/2017               5.7 (H)             4.0 - 5.6 %           Final                03/27/2017               6.0                 4.5 - 6.2 %           Final            11/30/2016               5.8                 4.5 - 6.2 %           Final         Last edited by Anitha Munoz on 4/30/2019  7:44 AM. (History)            Assessment /Plan     For exam results, see Encounter Report.    Type 2 diabetes mellitus without retinopathy - Both Eyes    Nuclear sclerosis, bilateral    History of laser photocoagulation of retina - Left Eye    Myopia with astigmatism and presbyopia, bilateral      1. No diabetic retinopathy, no csme. Return in 1 year for dilated eye exam.  2. Educated pt on presence of cataracts and effects on vision. No surgery at this time. Recheck in one year.  3. Monitor condition. Patient to report any changes. RTC 1 year recheck.       4. New Spec Rx given and Contact lens Rx released to pt. Daily wear only advised, with education to risks of extended wear.  Discussed lens care, compliance and solutions. RTC yearly contact lens follow up.   . Different lens options discussed with patient. RTC 1 year full exam.       Female infant born at 38 weeks 3 days to a 37 year old  mother via C/S. Maternal history of alpha thalassemia trait. Pregnancy course complicated by rhesus alloimmunization.  Maternal blood type B-. GBS negative, HBsAg negative, HIV negative; treponema non-reactive & Rubella immune.     Delivery uncomplicated. APGAR 9 & 9 at 1 & 5 minutes respectively. Birth weight 3610g, LGA. Erythromycin eye drops and vitamin K given. Infant blood type B+, Luke positive. EOS score <1.   Female infant born at 38 weeks 3 days to a 37 year old  mother via C/S. Maternal history of alpha thalassemia trait. Pregnancy course complicated by rhesus alloimmunization.  Maternal blood type B-. GBS negative, HBsAg negative, HIV negative; treponema non-reactive & Rubella immune.     Delivery uncomplicated. APGAR 9 & 9 at 1 & 5 minutes respectively. Birth weight 3610g, LGA. Erythromycin eye drops and vitamin K given. Infant blood type B+, Luke positive. EOS score <1.    Hospital course was unremarkable. Patient passed the [facility]. Hearing test results as below. Patient is tolerating PO, voiding & stooling without any difficulties. Infant's weight loss prior to discharge within acceptable limits for age. Discharge bilirubin as noted. Discharge TC bilirubin *** @ *** hours of life; phototherapy threshold *** mg/dL. Patient is medically stable to be discharged home and will follow up with pediatrician in 24-48hrs to initiate  care.     VSS    Physical Exam  General: no acute distress, well appearing  Head: anterior fontanel open and flat  Eyes: red reflex + b/l   Ears/Nose: patent w/ no deformities  Mouth/Throat: no cleft lip or palate   Neck: no masses or lesion, no clavicular crepitus  Cardiovascular: S1 & S2, no significant murmurs, femoral pulses 2+ B/L  Respiratory: Lungs clear to auscultation bilaterally, no wheezing, rales or rhonchi; no retractions  Abdomen: soft, non-distended, BS +, no masses, no organomegaly, umbilical cord stump attached  Genitourinary: normal jorge 1 external genitalia  Anus: patent   Back: no sacral dimple or tags  Musculoskeletal: moving all extremities, Ortolani/Aguirre negative  Skin: no significant lesions, no jaundice  Neurological: reactive; suck, grasp, kenneth & Babinski reflexes +    AAP Bright Futures handout regarding anticipatory guidance for infant was made available to mother on hospital tablet device in room.    I discussed plan of care with mother who stated understanding with verbal feedback.    I was physically present for the evaluation and management services provided.  I agree with the above history and discharge plan which I reviewed and edited where appropriate.  I spent 35 minutes with the patient and the patient's family on direct patient care and discharge planning    Erica Benito DO  Pediatric Hospitalist   Female infant born at 38 weeks 3 days to a 37 year old  mother via C/S. Maternal history of alpha thalassemia trait. Pregnancy course complicated by rhesus alloimmunization.  Maternal blood type B-. GBS negative, HBsAg negative, HIV negative; treponema non-reactive & Rubella immune.     Delivery uncomplicated. APGAR 9 & 9 at 1 & 5 minutes respectively. Birth weight 3610g, LGA. Erythromycin eye drops and vitamin K given. Infant blood type B+, Luke positive. EOS score <1.    Hospital course was unremarkable. Patient passed the [facility]. Hearing test results as below. Patient is tolerating PO, voiding & stooling without any difficulties. Infant's weight loss prior to discharge within acceptable limits for age. Discharge bilirubin as noted. Discharge TC bilirubin 6.1 @ 61.5 hours of life; phototherapy threshold 15.5 mg/dL. Patient is medically stable to be discharged home and will follow up with pediatrician in 24-48hrs to initiate  care.     VSS    Physical Exam  General: no acute distress, well appearing  Head: anterior fontanel open and flat  Eyes: red reflex + b/l   Ears/Nose: patent w/ no deformities  Mouth/Throat: no cleft lip or palate   Neck: no masses or lesion, no clavicular crepitus  Cardiovascular: S1 & S2, no significant murmurs, femoral pulses 2+ B/L  Respiratory: Lungs clear to auscultation bilaterally, no wheezing, rales or rhonchi; no retractions  Abdomen: soft, non-distended, BS +, no masses, no organomegaly, umbilical cord stump attached  Genitourinary: normal jorge 1 external genitalia  Anus: patent   Back: no sacral dimple or tags  Musculoskeletal: moving all extremities, Ortolani/Aguirre negative  Skin: no significant lesions, no jaundice  Neurological: reactive; suck, grasp, kenneth & Babinski reflexes +    AAP Bright Futures handout regarding anticipatory guidance for infant was made available to mother on hospital tablet device in room.    I discussed plan of care with mother who stated understanding with verbal feedback.    I was physically present for the evaluation and management services provided.  I agree with the above history and discharge plan which I reviewed and edited where appropriate.  I spent 35 minutes with the patient and the patient's family on direct patient care and discharge planning    Erica Benito DO  Pediatric Hospitalist

## 2024-01-24 ENCOUNTER — OFFICE VISIT (OUTPATIENT)
Dept: OPTOMETRY | Facility: CLINIC | Age: 69
End: 2024-01-24
Payer: MEDICARE

## 2024-01-24 DIAGNOSIS — Z97.3 WEARS CONTACT LENSES: ICD-10-CM

## 2024-01-24 DIAGNOSIS — Z46.0 FITTING AND ADJUSTMENT OF SPECTACLES AND CONTACT LENSES: ICD-10-CM

## 2024-01-24 DIAGNOSIS — E11.9 TYPE 2 DIABETES MELLITUS WITHOUT RETINOPATHY: ICD-10-CM

## 2024-01-24 DIAGNOSIS — H25.13 NUCLEAR SCLEROSIS OF BOTH EYES: ICD-10-CM

## 2024-01-24 DIAGNOSIS — H52.7 REFRACTIVE ERROR: Primary | ICD-10-CM

## 2024-01-24 DIAGNOSIS — H47.093 PERIPAPILLARY ATROPHY OF BOTH EYES: ICD-10-CM

## 2024-01-24 DIAGNOSIS — I10 PRIMARY HYPERTENSION: ICD-10-CM

## 2024-01-24 DIAGNOSIS — Z98.890 HISTORY OF LASER PHOTOCOAGULATION OF RETINA: ICD-10-CM

## 2024-01-24 DIAGNOSIS — E11.41 TYPE 2 DIABETES MELLITUS WITH DIABETIC MONONEUROPATHY, WITHOUT LONG-TERM CURRENT USE OF INSULIN: ICD-10-CM

## 2024-01-24 PROCEDURE — 92014 COMPRE OPH EXAM EST PT 1/>: CPT | Mod: S$GLB,,, | Performed by: OPTOMETRIST

## 2024-01-24 PROCEDURE — 92015 DETERMINE REFRACTIVE STATE: CPT | Mod: S$GLB,,, | Performed by: OPTOMETRIST

## 2024-01-24 PROCEDURE — 99999 PR PBB SHADOW E&M-EST. PATIENT-LVL III: CPT | Mod: PBBFAC,,, | Performed by: OPTOMETRIST

## 2024-01-24 NOTE — PROGRESS NOTES
HPI    DLS: 10/28/22    Saline PRN OU   Hemoglobin A1C (%)       Date                     Value                 04/19/2021               5.8                   01/30/2020               6.1                   01/30/2020               6.1 (A)               06/22/2017               5.7 (H)          ----------     Pt here for yearly check and clfit. Pt denies flashes, floaters, headaches   or eye pain OU.  Pt denies itching, tearing or burning OU.     Last edited by Cayla Mars MA on 1/24/2024 10:40 AM.            Assessment /Plan     For exam results, see Encounter Report.    Refractive error  Fitting and adjustment of spectacles and contact lenses  Wears contact lenses                 Rx specs               Continue with current contact               Remove nightly, replace monthly               Return for adjustments if changes in vision    Type 2 diabetes mellitus without retinopathy - Both Eyes  Type 2 diabetes mellitus with diabetic mononeuropathy, without long-term current use of insulin  Primary hypertension               No retinopathy, monitor yearly     History of laser photocoagulation of retina  H/o barrier laser tx for retinal hole OS-Stable.     Peripapillary atrophy of both eyes     Nuclear sclerosis of both eyes               Mild, monitor       RTC 1 year

## 2024-01-30 ENCOUNTER — PATIENT MESSAGE (OUTPATIENT)
Dept: OPTOMETRY | Facility: CLINIC | Age: 69
End: 2024-01-30
Payer: MEDICARE

## 2024-02-12 RX ORDER — CLOPIDOGREL BISULFATE 75 MG/1
75 TABLET ORAL DAILY
Qty: 90 TABLET | Refills: 3 | Status: SHIPPED | OUTPATIENT
Start: 2024-02-12

## 2024-03-10 DIAGNOSIS — N40.0 BPH WITHOUT OBSTRUCTION/LOWER URINARY TRACT SYMPTOMS: ICD-10-CM

## 2024-03-11 RX ORDER — TAMSULOSIN HYDROCHLORIDE 0.4 MG/1
1 CAPSULE ORAL
Qty: 90 CAPSULE | Refills: 1 | Status: SHIPPED | OUTPATIENT
Start: 2024-03-11

## 2024-04-02 ENCOUNTER — OFFICE VISIT (OUTPATIENT)
Dept: CARDIOLOGY | Facility: CLINIC | Age: 69
End: 2024-04-02
Payer: MEDICARE

## 2024-04-02 VITALS
BODY MASS INDEX: 29.79 KG/M2 | DIASTOLIC BLOOD PRESSURE: 50 MMHG | OXYGEN SATURATION: 97 % | WEIGHT: 219.94 LBS | SYSTOLIC BLOOD PRESSURE: 128 MMHG | HEART RATE: 67 BPM | HEIGHT: 72 IN

## 2024-04-02 DIAGNOSIS — I77.1 STRICTURE OF ARTERY: Primary | ICD-10-CM

## 2024-04-02 DIAGNOSIS — E78.5 HYPERLIPIDEMIA, UNSPECIFIED HYPERLIPIDEMIA TYPE: ICD-10-CM

## 2024-04-02 DIAGNOSIS — I25.10 CORONARY ARTERY DISEASE INVOLVING NATIVE CORONARY ARTERY OF NATIVE HEART, UNSPECIFIED WHETHER ANGINA PRESENT: ICD-10-CM

## 2024-04-02 DIAGNOSIS — I10 PRIMARY HYPERTENSION: ICD-10-CM

## 2024-04-02 DIAGNOSIS — R06.09 DOE (DYSPNEA ON EXERTION): ICD-10-CM

## 2024-04-02 PROCEDURE — 3078F DIAST BP <80 MM HG: CPT | Mod: CPTII,S$GLB,, | Performed by: INTERNAL MEDICINE

## 2024-04-02 PROCEDURE — 99214 OFFICE O/P EST MOD 30 MIN: CPT | Mod: S$GLB,,, | Performed by: INTERNAL MEDICINE

## 2024-04-02 PROCEDURE — 3074F SYST BP LT 130 MM HG: CPT | Mod: CPTII,S$GLB,, | Performed by: INTERNAL MEDICINE

## 2024-04-02 PROCEDURE — 3288F FALL RISK ASSESSMENT DOCD: CPT | Mod: CPTII,S$GLB,, | Performed by: INTERNAL MEDICINE

## 2024-04-02 PROCEDURE — 93000 ELECTROCARDIOGRAM COMPLETE: CPT | Mod: S$GLB,,, | Performed by: INTERNAL MEDICINE

## 2024-04-02 PROCEDURE — 4010F ACE/ARB THERAPY RXD/TAKEN: CPT | Mod: CPTII,S$GLB,, | Performed by: INTERNAL MEDICINE

## 2024-04-02 PROCEDURE — 3008F BODY MASS INDEX DOCD: CPT | Mod: CPTII,S$GLB,, | Performed by: INTERNAL MEDICINE

## 2024-04-02 PROCEDURE — 1101F PT FALLS ASSESS-DOCD LE1/YR: CPT | Mod: CPTII,S$GLB,, | Performed by: INTERNAL MEDICINE

## 2024-04-02 PROCEDURE — 1124F ACP DISCUSS-NO DSCNMKR DOCD: CPT | Mod: CPTII,S$GLB,, | Performed by: INTERNAL MEDICINE

## 2024-04-02 PROCEDURE — 1126F AMNT PAIN NOTED NONE PRSNT: CPT | Mod: CPTII,S$GLB,, | Performed by: INTERNAL MEDICINE

## 2024-04-02 PROCEDURE — 99999 PR PBB SHADOW E&M-EST. PATIENT-LVL III: CPT | Mod: PBBFAC,,, | Performed by: INTERNAL MEDICINE

## 2024-04-02 NOTE — PROGRESS NOTES
"Subjective   Patient ID:  Edel Edwards III is a 68 y.o. male who presents for follow-up of No chief complaint on file.      HPI      HTN, DM, CAD - TESSA to LAD 12/20/16 7/6/18 [facility] ED 6, LAD stent widely patent, D1  Osteal 90% - "stent FCI" - medium vessel, Cx distal 70% - small distal vessel, RCA medium size with diffuse mid 70%  Reviewed with Dr Whaley  Continue with medical Rx     12/20/16 [facility] - ED 17, no LV, LAD tandem proximal 90% lesions, Cx distal 70%, RCA medium with diffuse mid 80%        Description of the findings of the procedure: calcified, fibrotic 90%prox LAD --> 0% by predil with 3.5 angioscore and NC balloon. Placement of a 3.5 x 30 mm TESSA.     He continued to have mostly fatigue symptoms following stent     Echo 8/8/23    Left Ventricle: The left ventricle is normal in size. Mildly increased wall thickness. Normal wall motion. There is normal systolic function with a visually estimated ejection fraction of 60 - 65%. Grade I diastolic dysfunction.    Left Atrium: Left atrium is moderately dilated.    Right Ventricle: Normal right ventricular cavity size. Systolic function is normal.    Pulmonary Artery: The estimated pulmonary artery systolic pressure is 21 mmHg.    IVC/SVC: Normal venous pressure at 3 mmHg.     Stress test 8/8/23    Abnormal myocardial perfusion scan.    There is a moderate to severe intensity, moderate to large sized, mostly fixed perfusion abnormality with some reversibilty in the inferior wall(s).    There are no other significant perfusion abnormalities.    The gated perfusion images showed an ejection fraction of 77% at rest.    There is normal wall motion at rest and post stress.    The ECG portion of the study is negative for ischemia.    The patient reported no chest pain during the stress test.    There were no arrhythmias during stress.  Similar to images 10/27/21     5/23/17 Reviewed cath film and stress test with Dr Fried - could be a candidate for PCI of " RCA although this is a medium sized diffusely diseased vessel. Given improved symptoms will continue to treat medically for now     7/24/18 Did well after [facility]  Denies CP  Had mild stable OLIVIER     10/29/18 Not exercising much  Denies CP  Stable OLIVIER  EKG NSR - ok     4/9/19 Denies CP or SOB  Still not exercising much  EKG NSR - ok     8/13/21 Mild OLIVIER and occasional sharp CP  EKG NSR PRWP   Continue Rx for CAD, HTN, HLD, DM  OV 3 months with echo and lexiscan myoview for CP and OLIVIER - says he can't walk treadmill due to joint issues     8/2/22 Denies CP or SOB.  Hytrin apparently no longer being manufactured  BP controlled  EKG NSR PRWP  Observe BP off of hytrin - if not controlled consider addin norvasc  Continue Rx for CAD, HTN, HLD, DM  OV 6 months     2/1/23 Denies CP or SOB  BP controlled  EKG NSR - ok  Continue Rx for CAD, HTN, HLD, DM  OV 6 months with echo and lexiscan myoview for CAD      8/10/23 Denies CP or SOB  BP controlled  Doesn't exercise much  Stable fixed inferior defect on stress test, EF 60-65%  Continue Rx for CAD, HTN, HLD, DM  OV 6 months     4/2/24 Denies CP or SOB. Exercising more  EKG NSR - ok  BP controlled    Review of Systems   Constitutional: Negative for decreased appetite.   HENT:  Negative for ear discharge.    Eyes:  Negative for blurred vision.   Endocrine: Negative for polyphagia.   Skin:  Negative for nail changes.   Genitourinary:  Negative for bladder incontinence.   Neurological:  Negative for aphonia.   Psychiatric/Behavioral:  Negative for hallucinations.    Allergic/Immunologic: Negative for hives.          Objective     Physical Exam  Constitutional:       Appearance: He is well-developed.   HENT:      Head: Normocephalic and atraumatic.   Eyes:      Conjunctiva/sclera: Conjunctivae normal.      Pupils: Pupils are equal, round, and reactive to light.   Cardiovascular:      Rate and Rhythm: Normal rate.      Pulses: Intact distal pulses.      Heart sounds: Normal heart sounds.    Pulmonary:      Effort: Pulmonary effort is normal.      Breath sounds: Normal breath sounds.   Abdominal:      General: Bowel sounds are normal.      Palpations: Abdomen is soft.   Musculoskeletal:         General: Normal range of motion.      Cervical back: Normal range of motion and neck supple.   Skin:     General: Skin is warm and dry.   Neurological:      Mental Status: He is alert and oriented to person, place, and time.            Assessment and Plan     1. Stricture of artery    2. Primary hypertension    3. Hyperlipidemia, unspecified hyperlipidemia type    4. Coronary artery disease involving native coronary artery of native heart, unspecified whether angina present    5. OLIVIER (dyspnea on exertion)        Plan:     Continue Rx for CAD, HTN, HLD, DM  OV 6 months    Advance Care Planning     Date: 04/02/2024  Patient did not wish or was not able to name a surrogate decision maker or provide an Advance Care Plan.

## 2024-04-03 LAB
OHS QRS DURATION: 84 MS
OHS QTC CALCULATION: 404 MS

## 2024-06-11 ENCOUNTER — PATIENT MESSAGE (OUTPATIENT)
Dept: CARDIOLOGY | Facility: CLINIC | Age: 69
End: 2024-06-11
Payer: MEDICARE

## 2024-09-10 ENCOUNTER — PATIENT MESSAGE (OUTPATIENT)
Dept: CARDIOLOGY | Facility: CLINIC | Age: 69
End: 2024-09-10
Payer: MEDICARE

## 2024-10-30 RX ORDER — METOPROLOL SUCCINATE 25 MG/1
25 TABLET, EXTENDED RELEASE ORAL
Qty: 90 TABLET | Refills: 2 | Status: SHIPPED | OUTPATIENT
Start: 2024-10-30

## 2024-10-31 RX ORDER — ISOSORBIDE MONONITRATE 30 MG/1
30 TABLET, EXTENDED RELEASE ORAL
Qty: 90 TABLET | Refills: 3 | Status: SHIPPED | OUTPATIENT
Start: 2024-10-31

## 2024-11-01 DIAGNOSIS — N40.0 BPH WITHOUT OBSTRUCTION/LOWER URINARY TRACT SYMPTOMS: ICD-10-CM

## 2024-11-01 RX ORDER — TAMSULOSIN HYDROCHLORIDE 0.4 MG/1
1 CAPSULE ORAL
Qty: 90 CAPSULE | Refills: 0 | Status: SHIPPED | OUTPATIENT
Start: 2024-11-01

## 2025-01-08 RX ORDER — CLOPIDOGREL BISULFATE 75 MG/1
75 TABLET ORAL
Qty: 90 TABLET | Refills: 2 | OUTPATIENT
Start: 2025-01-08

## 2025-01-29 RX ORDER — CLOPIDOGREL BISULFATE 75 MG/1
75 TABLET ORAL DAILY
Qty: 90 TABLET | Refills: 3 | Status: SHIPPED | OUTPATIENT
Start: 2025-01-29

## 2025-01-30 DIAGNOSIS — N40.0 BPH WITHOUT OBSTRUCTION/LOWER URINARY TRACT SYMPTOMS: ICD-10-CM

## 2025-01-30 RX ORDER — TAMSULOSIN HYDROCHLORIDE 0.4 MG/1
1 CAPSULE ORAL
Qty: 90 CAPSULE | Refills: 0 | Status: SHIPPED | OUTPATIENT
Start: 2025-01-30

## 2025-03-13 ENCOUNTER — OFFICE VISIT (OUTPATIENT)
Dept: UROLOGY | Facility: CLINIC | Age: 70
End: 2025-03-13
Payer: MEDICARE

## 2025-03-13 VITALS — WEIGHT: 225.31 LBS | BODY MASS INDEX: 30.56 KG/M2

## 2025-03-13 DIAGNOSIS — R35.1 NOCTURIA MORE THAN TWICE PER NIGHT: ICD-10-CM

## 2025-03-13 DIAGNOSIS — N52.9 ED (ERECTILE DYSFUNCTION) OF ORGANIC ORIGIN: ICD-10-CM

## 2025-03-13 DIAGNOSIS — R97.20 ELEVATED PSA: ICD-10-CM

## 2025-03-13 DIAGNOSIS — N40.0 BPH WITHOUT OBSTRUCTION/LOWER URINARY TRACT SYMPTOMS: Primary | ICD-10-CM

## 2025-03-13 PROCEDURE — 99999 PR PBB SHADOW E&M-EST. PATIENT-LVL III: CPT | Mod: PBBFAC,,, | Performed by: UROLOGY

## 2025-03-13 PROCEDURE — 1160F RVW MEDS BY RX/DR IN RCRD: CPT | Mod: CPTII,S$GLB,, | Performed by: UROLOGY

## 2025-03-13 PROCEDURE — 1126F AMNT PAIN NOTED NONE PRSNT: CPT | Mod: CPTII,S$GLB,, | Performed by: UROLOGY

## 2025-03-13 PROCEDURE — 3072F LOW RISK FOR RETINOPATHY: CPT | Mod: CPTII,S$GLB,, | Performed by: UROLOGY

## 2025-03-13 PROCEDURE — 1159F MED LIST DOCD IN RCRD: CPT | Mod: CPTII,S$GLB,, | Performed by: UROLOGY

## 2025-03-13 PROCEDURE — 3008F BODY MASS INDEX DOCD: CPT | Mod: CPTII,S$GLB,, | Performed by: UROLOGY

## 2025-03-13 PROCEDURE — 99214 OFFICE O/P EST MOD 30 MIN: CPT | Mod: S$GLB,,, | Performed by: UROLOGY

## 2025-03-13 RX ORDER — TAMSULOSIN HYDROCHLORIDE 0.4 MG/1
1 CAPSULE ORAL DAILY
Qty: 90 CAPSULE | Refills: 3 | Status: SHIPPED | OUTPATIENT
Start: 2025-03-13

## 2025-03-13 NOTE — PROGRESS NOTES
Subjective:       Patient ID: Edel Edwards III is a 69 y.o. male  The patient's last visit with me was on 10/3/2023.     Chief Complaint:   Chief Complaint   Patient presents with    Follow-up     Annual ck up         Elevated PSA  Patient is here with an elevated PSA. He has no personal history and no family history of prostate cancer.  He has a prior genitourinary history of erectile dysfunction, and previous biopsies a few years ago with Dr. Umanzor.          Component  Ref Range & Units (hover) 1 yr ago 2 yr ago 5 yr ago   PSA Diagnostic 7.3 High  8.1 High  CM 6.3 High  CM   Comment: The testing method is a chemiluminescent microparticle immunoassay  manufactured by [employer] and performed on the [employer]ower  or  mcTEL system. Values obtained with different assay manufacturers  for  methods may be different and cannot be used interchangeably.  PSA Expected levels:  Hormonal Therapy: <0.05 ng/ml  Prostatectomy: <0.01 ng/ml  Radiation Therapy: <1.00 ng/ml   Resulting Agency [facility]              Narrative  Performed by: [facility]  3rd floor lab   Specimen Collected: 10/03/23 10:05 CDT     03/13/2025  He feels okay.  He notes some discomfort in his right groin for several months.    Benign Prostatic Hyperplasia  He patient reports nocturia two times a night. He denies straining, urgency and weak stream. The patient states symptoms are of moderate severity. Onset of symptoms was several years ago and was gradual in onset.  He has no personal history and no family history of prostate cancer. He reports a history of no complicating symptoms. He denies flank pain, gross hematuria, kidney stones and recurrent UTI.  He is currently taking Flomax.      Erectile Dysfunction  Patient complains of erectile dysfunction. Onset of dysfunction was several years ago and was gradual in onset.  Patient states the nature of difficulty is maintaining erection. Full erections occur with intercourse. Partial  erections occur with intercourse. Libido is not affected. Risk factors for ED include cardiovascular disease and diabetes mellitus. Patient denies history of pelvic radiation. Previous treatment of ED includes Levitra and Cialis.  He is no longer taking these after his recent cardiac stent placement on Imdur Rx.      ACTIVE MEDICAL ISSUES:  Patient Active Problem List   Diagnosis    Type 2 diabetes mellitus with neurologic complication, without long-term current use of insulin    HTN (hypertension)    Hyperlipidemia    Type 2 diabetes mellitus without retinopathy - Both Eyes    Nuclear sclerosis - Both Eyes    BMI 33.0-33.9,adult    Elevated PSA    Chest pain, atypical    CAD (coronary artery disease)    OLIVIER (dyspnea on exertion)    Refractive error    Wears contact lenses    Class 1 obesity due to excess calories with serious comorbidity in adult    Stricture of artery       ALLERGIES AND MEDICATIONS: updated and reviewed.  Review of patient's allergies indicates:  No Known Allergies  Current Outpatient Medications   Medication Sig    ACCU-CHEK SMARTVIEW TEST STRIP Strp as needed.     aspirin (ECOTRIN) 81 MG EC tablet Take 81 mg by mouth once daily.    atorvastatin (LIPITOR) 40 MG tablet TAKE 1/2 TABLET BY MOUTH EVERY DAY    celecoxib (CELEBREX) 200 MG capsule Take by mouth 2 (two) times daily.    cetirizine (ZYRTEC) 10 MG tablet Take 10 mg by mouth once daily.    clopidogreL (PLAVIX) 75 mg tablet Take 1 tablet (75 mg total) by mouth once daily.    diphth,pertus,acell,,tetanus (BOOSTRIX) 2.5-8-5 Lf-mcg-Lf/0.5mL Syrg injection Boostrix Tdap 2.5 Lf unit-8 mcg-5 Lf/0.5 mL intramuscular syringe   inject 0.5 milliliter intramuscularly    gabapentin (NEURONTIN) 800 MG tablet Take 0.5 tablets (400 mg total) by mouth once daily.    isosorbide mononitrate (IMDUR) 30 MG 24 hr tablet Take 1 tablet by mouth once daily.    ketoconazole (NIZORAL) 2 % cream Apply topically 2 (two) times daily.    metoprolol succinate (TOPROL-XL)  25 MG 24 hr tablet Take 1 tablet by mouth once daily.    mometasone 0.1% (ELOCON) 0.1 % cream Apply topically once daily.    OZEMPIC 1 mg/dose (2 mg/1.5 mL) PnIj INJECT 1 MG WEEKLY    SHINGRIX, PF, 50 mcg/0.5 mL injection     zoster vaccine live, PF, (ZOSTAVAX) 19,400 unit/0.65 mL injection Zostavax (PF) 19,400 unit/0.65 mL subcutaneous suspension   inject contents of 1 vial subcutaneously    azilsartan medoxomiL 80 mg Tab Take by mouth once daily.     clotrimazole-betamethasone 1-0.05% (LOTRISONE) cream Apply topically as needed.    etodolac (LODINE) 500 MG tablet Take 1 tablet (500 mg total) by mouth 2 (two) times daily.    flu vac ts 2016-17,4 yr up,/PF (FLU VAC TS 2014-15,36MOS+,,PF,) 45 mcg (15 mcg x 3)/0.5 mL Flucelvax 7350-9815 (PF) 45 mcg (15 mcg x 3)/0.5 mL IM syringe   inject 0.5 milliliter intramuscularly    influenza (FLULAVAL QUADRIVALENT) 60 mcg (15 mcg x 4)/0.5 mL Syrg vaccine Fluarix Quad 1509-0438 (PF) 60 mcg (15 mcg x 4)/0.5 mL IM syringe   inject 0.5 milliliter intramuscularly    JARDIANCE 10 mg tablet Take 10 mg by mouth once daily.    metformin (GLUCOPHAGE) 1000 MG tablet Take 1,000 mg by mouth 2 (two) times daily with meals.    methylPREDNISolone (MEDROL) 4 MG Tab methylprednisolone 4 mg tablet   Take 1 tablet every day by oral route with meals.    tamsulosin (FLOMAX) 0.4 mg Cap Take 1 capsule (0.4 mg total) by mouth once daily.     No current facility-administered medications for this visit.       Review of Systems   Constitutional:  Negative for activity change, fatigue, fever and unexpected weight change.   HENT:  Negative for congestion.    Eyes:  Negative for redness.   Respiratory:  Negative for chest tightness and shortness of breath.    Cardiovascular:  Negative for chest pain and leg swelling.   Gastrointestinal:  Negative for abdominal pain, constipation, diarrhea, nausea and vomiting.   Genitourinary:  Negative for dysuria, flank pain, frequency, hematuria, penile pain, penile  swelling, scrotal swelling, testicular pain and urgency.   Musculoskeletal:  Negative for arthralgias and back pain.   Neurological:  Negative for dizziness and light-headedness.   Psychiatric/Behavioral:  Negative for behavioral problems and confusion. The patient is not nervous/anxious.    All other systems reviewed and are negative.      Objective:      Vitals:    03/13/25 0956   Weight: 102.2 kg (225 lb 5 oz)           Physical Exam  Vitals and nursing note reviewed.   Constitutional:       Appearance: He is well-developed.   HENT:      Head: Normocephalic.   Eyes:      Conjunctiva/sclera: Conjunctivae normal.   Neck:      Thyroid: No thyromegaly.      Trachea: No tracheal deviation.   Cardiovascular:      Rate and Rhythm: Normal rate.      Heart sounds: Normal heart sounds.   Pulmonary:      Effort: Pulmonary effort is normal. No respiratory distress.      Breath sounds: Normal breath sounds. No wheezing.   Abdominal:      General: Bowel sounds are normal.      Palpations: Abdomen is soft.      Tenderness: There is no abdominal tenderness. There is no rebound.      Hernia: No hernia is present. There is no hernia in the right inguinal area or left inguinal area.   Genitourinary:     Penis: Normal.       Testes: Normal.   Musculoskeletal:         General: No tenderness. Normal range of motion.      Cervical back: Normal range of motion and neck supple.   Lymphadenopathy:      Cervical: No cervical adenopathy.   Skin:     General: Skin is warm and dry.      Findings: No erythema or rash.   Neurological:      Mental Status: He is alert and oriented to person, place, and time.   Psychiatric:         Behavior: Behavior normal.         Thought Content: Thought content normal.         Judgment: Judgment normal.         Urine dipstick shows negative for all components.  Micro exam: negative for WBC's or RBC's.    DIS MRI Prostate 8/30/2022  83 cc  PIRADS 2 in transition and peripheral zones.  Overall impression,  BPH      Assessment:       1. BPH without obstruction/lower urinary tract symptoms    2. Elevated PSA    3. Nocturia more than twice per night    4. ED (erectile dysfunction) of organic origin                Plan:       1. Elevated PSA  PSA now   PSA in a year    - Prostate Specific Antigen, Diagnostic; Future  - Prostate Specific Antigen, Diagnostic; Future    2. BPH without obstruction/lower urinary tract symptoms  Flomax    3. Nocturia more than twice per night  Limit evening fluids    4. ED (erectile dysfunction) of organic origin  Declined                 Follow up in about 1 year (around 3/13/2026) for Follow up Established.

## 2025-04-02 ENCOUNTER — OFFICE VISIT (OUTPATIENT)
Dept: CARDIOLOGY | Facility: CLINIC | Age: 70
End: 2025-04-02
Payer: MEDICARE

## 2025-04-02 VITALS
BODY MASS INDEX: 30.91 KG/M2 | HEIGHT: 72 IN | OXYGEN SATURATION: 96 % | SYSTOLIC BLOOD PRESSURE: 100 MMHG | DIASTOLIC BLOOD PRESSURE: 58 MMHG | WEIGHT: 228.19 LBS | RESPIRATION RATE: 18 BRPM | HEART RATE: 70 BPM

## 2025-04-02 DIAGNOSIS — I25.10 CORONARY ARTERY DISEASE INVOLVING NATIVE CORONARY ARTERY OF NATIVE HEART, UNSPECIFIED WHETHER ANGINA PRESENT: ICD-10-CM

## 2025-04-02 DIAGNOSIS — I10 PRIMARY HYPERTENSION: Primary | ICD-10-CM

## 2025-04-02 DIAGNOSIS — R07.89 CHEST PAIN, ATYPICAL: ICD-10-CM

## 2025-04-02 DIAGNOSIS — R06.09 DOE (DYSPNEA ON EXERTION): ICD-10-CM

## 2025-04-02 DIAGNOSIS — E78.5 HYPERLIPIDEMIA, UNSPECIFIED HYPERLIPIDEMIA TYPE: ICD-10-CM

## 2025-04-02 PROCEDURE — 3008F BODY MASS INDEX DOCD: CPT | Mod: CPTII,S$GLB,, | Performed by: INTERNAL MEDICINE

## 2025-04-02 PROCEDURE — 99214 OFFICE O/P EST MOD 30 MIN: CPT | Mod: S$GLB,,, | Performed by: INTERNAL MEDICINE

## 2025-04-02 PROCEDURE — 4010F ACE/ARB THERAPY RXD/TAKEN: CPT | Mod: CPTII,S$GLB,, | Performed by: INTERNAL MEDICINE

## 2025-04-02 PROCEDURE — 3078F DIAST BP <80 MM HG: CPT | Mod: CPTII,S$GLB,, | Performed by: INTERNAL MEDICINE

## 2025-04-02 PROCEDURE — 3074F SYST BP LT 130 MM HG: CPT | Mod: CPTII,S$GLB,, | Performed by: INTERNAL MEDICINE

## 2025-04-02 PROCEDURE — 1101F PT FALLS ASSESS-DOCD LE1/YR: CPT | Mod: CPTII,S$GLB,, | Performed by: INTERNAL MEDICINE

## 2025-04-02 PROCEDURE — 1126F AMNT PAIN NOTED NONE PRSNT: CPT | Mod: CPTII,S$GLB,, | Performed by: INTERNAL MEDICINE

## 2025-04-02 PROCEDURE — 1124F ACP DISCUSS-NO DSCNMKR DOCD: CPT | Mod: CPTII,S$GLB,, | Performed by: INTERNAL MEDICINE

## 2025-04-02 PROCEDURE — 3288F FALL RISK ASSESSMENT DOCD: CPT | Mod: CPTII,S$GLB,, | Performed by: INTERNAL MEDICINE

## 2025-04-02 PROCEDURE — 3072F LOW RISK FOR RETINOPATHY: CPT | Mod: CPTII,S$GLB,, | Performed by: INTERNAL MEDICINE

## 2025-04-02 PROCEDURE — 99999 PR PBB SHADOW E&M-EST. PATIENT-LVL V: CPT | Mod: PBBFAC,,, | Performed by: INTERNAL MEDICINE

## 2025-04-02 PROCEDURE — 93000 ELECTROCARDIOGRAM COMPLETE: CPT | Mod: S$GLB,,, | Performed by: INTERNAL MEDICINE

## 2025-04-02 PROCEDURE — 1159F MED LIST DOCD IN RCRD: CPT | Mod: CPTII,S$GLB,, | Performed by: INTERNAL MEDICINE

## 2025-04-02 NOTE — PROGRESS NOTES
"Subjective   Patient ID:  Edel Edwards III is a 69 y.o. male who presents for follow-up of Follow-up      HPI      HTN, DM, CAD - TESSA to LAD 12/20/16 7/6/18 [facility] ED 6, LAD stent widely patent, D1  Osteal 90% - "stent intermediate" - medium vessel, Cx distal 70% - small distal vessel, RCA medium size with diffuse mid 70%  Reviewed with Dr Whaley  Continue with medical Rx     12/20/16 [facility] ED 17, no LV, LAD tandem proximal 90% lesions, Cx distal 70%, RCA medium with diffuse mid 80%        Description of the findings of the procedure: calcified, fibrotic 90%prox LAD --> 0% by predil with 3.5 angioscore and NC balloon. Placement of a 3.5 x 30 mm TESSA.     He continued to have mostly fatigue symptoms following stent     Echo 8/8/23    Left Ventricle: The left ventricle is normal in size. Mildly increased wall thickness. Normal wall motion. There is normal systolic function with a visually estimated ejection fraction of 60 - 65%. Grade I diastolic dysfunction.    Left Atrium: Left atrium is moderately dilated.    Right Ventricle: Normal right ventricular cavity size. Systolic function is normal.    Pulmonary Artery: The estimated pulmonary artery systolic pressure is 21 mmHg.    IVC/SVC: Normal venous pressure at 3 mmHg.     Stress test 8/8/23    Abnormal myocardial perfusion scan.    There is a moderate to severe intensity, moderate to large sized, mostly fixed perfusion abnormality with some reversibilty in the inferior wall(s).    There are no other significant perfusion abnormalities.    The gated perfusion images showed an ejection fraction of 77% at rest.    There is normal wall motion at rest and post stress.    The ECG portion of the study is negative for ischemia.    The patient reported no chest pain during the stress test.    There were no arrhythmias during stress.  Similar to images 10/27/21     5/23/17 Reviewed cath film and stress test with Dr Fried - could be a candidate for PCI of RCA although this " is a medium sized diffusely diseased vessel. Given improved symptoms will continue to treat medically for now     7/24/18 Did well after [facility]  Denies CP  Had mild stable OLIVIER     10/29/18 Not exercising much  Denies CP  Stable OLIVIER  EKG NSR - ok     4/9/19 Denies CP or SOB  Still not exercising much  EKG NSR - ok     8/13/21 Mild OLIVIER and occasional sharp CP  EKG NSR PRWP   Continue Rx for CAD, HTN, HLD, DM  OV 3 months with echo and lexiscan myoview for CP and OLIVIER - says he can't walk treadmill due to joint issues     8/2/22 Denies CP or SOB.  Hytrin apparently no longer being manufactured  BP controlled  EKG NSR PRWP  Observe BP off of hytrin - if not controlled consider addin norvasc  Continue Rx for CAD, HTN, HLD, DM  OV 6 months     2/1/23 Denies CP or SOB  BP controlled  EKG NSR - ok  Continue Rx for CAD, HTN, HLD, DM  OV 6 months with echo and lexiscan myoview for CAD      8/10/23 Denies CP or SOB  BP controlled  Doesn't exercise much  Stable fixed inferior defect on stress test, EF 60-65%  Continue Rx for CAD, HTN, HLD, DM  OV 6 months     4/2/24 Denies CP or SOB. Exercising more  EKG NSR - ok  BP controlled  Continue Rx for CAD, HTN, HLD, DM  OV 6 months     4/2/25 Denies CP or SOB  BP controlled  EKG NSR - ok       Review of Systems   Constitutional: Negative for decreased appetite.   HENT:  Negative for ear discharge.    Eyes:  Negative for blurred vision.   Endocrine: Negative for polyphagia.   Skin:  Negative for nail changes.   Genitourinary:  Negative for bladder incontinence.   Neurological:  Negative for aphonia.   Psychiatric/Behavioral:  Negative for hallucinations.    Allergic/Immunologic: Negative for hives.          Objective     Physical Exam  Constitutional:       Appearance: He is well-developed.   HENT:      Head: Normocephalic and atraumatic.   Eyes:      Conjunctiva/sclera: Conjunctivae normal.      Pupils: Pupils are equal, round, and reactive to light.   Cardiovascular:      Rate and  Rhythm: Normal rate.      Pulses: Intact distal pulses.      Heart sounds: Normal heart sounds.   Pulmonary:      Effort: Pulmonary effort is normal.      Breath sounds: Normal breath sounds.   Abdominal:      General: Bowel sounds are normal.      Palpations: Abdomen is soft.   Musculoskeletal:         General: Normal range of motion.      Cervical back: Normal range of motion and neck supple.   Skin:     General: Skin is warm and dry.   Neurological:      Mental Status: He is alert and oriented to person, place, and time.            Assessment and Plan     1. Primary hypertension    2. Chest pain, atypical    3. OLIVIER (dyspnea on exertion)    4. Coronary artery disease involving native coronary artery of native heart, unspecified whether angina present    5. Hyperlipidemia, unspecified hyperlipidemia type        Plan:     Continue Rx for CAD, HTN, HLD, DM  OV 6 months with echo and [employer]    Advance Care Planning     Date: 04/02/2025  Patient did not wish or was not able to name a surrogate decision maker or provide an Advance Care Plan.

## 2025-04-03 LAB
OHS QRS DURATION: 92 MS
OHS QTC CALCULATION: 409 MS

## 2025-04-11 ENCOUNTER — TELEPHONE (OUTPATIENT)
Dept: UROLOGY | Facility: CLINIC | Age: 70
End: 2025-04-11
Payer: MEDICARE

## 2025-05-23 ENCOUNTER — OFFICE VISIT (OUTPATIENT)
Dept: OPTOMETRY | Facility: CLINIC | Age: 70
End: 2025-05-23
Payer: MEDICARE

## 2025-05-23 ENCOUNTER — PATIENT MESSAGE (OUTPATIENT)
Dept: OPTOMETRY | Facility: CLINIC | Age: 70
End: 2025-05-23

## 2025-05-23 DIAGNOSIS — E11.9 TYPE 2 DIABETES MELLITUS WITHOUT RETINOPATHY: Primary | ICD-10-CM

## 2025-05-23 DIAGNOSIS — Z46.0 FITTING AND ADJUSTMENT OF SPECTACLES AND CONTACT LENSES: Primary | ICD-10-CM

## 2025-05-23 DIAGNOSIS — I10 PRIMARY HYPERTENSION: ICD-10-CM

## 2025-05-23 DIAGNOSIS — H52.7 REFRACTIVE ERROR: ICD-10-CM

## 2025-05-23 DIAGNOSIS — Z46.0 FITTING AND ADJUSTMENT OF SPECTACLES AND CONTACT LENSES: ICD-10-CM

## 2025-05-23 DIAGNOSIS — H47.093 PERIPAPILLARY ATROPHY OF BOTH EYES: ICD-10-CM

## 2025-05-23 DIAGNOSIS — Z97.3 WEARS CONTACT LENSES: ICD-10-CM

## 2025-05-23 DIAGNOSIS — Z98.890 HISTORY OF LASER PHOTOCOAGULATION OF RETINA: ICD-10-CM

## 2025-05-23 DIAGNOSIS — H25.13 NUCLEAR SCLEROSIS OF BOTH EYES: ICD-10-CM

## 2025-05-23 DIAGNOSIS — E11.41 TYPE 2 DIABETES MELLITUS WITH DIABETIC MONONEUROPATHY, WITHOUT LONG-TERM CURRENT USE OF INSULIN: ICD-10-CM

## 2025-05-23 PROCEDURE — 99999 PR PBB SHADOW E&M-EST. PATIENT-LVL I: CPT | Mod: PBBFAC,,, | Performed by: OPTOMETRIST

## 2025-05-23 PROCEDURE — 99999 PR PBB SHADOW E&M-EST. PATIENT-LVL III: CPT | Mod: PBBFAC,,, | Performed by: OPTOMETRIST

## 2025-05-23 RX ORDER — FLUTICASONE PROPIONATE 50 MCG
SPRAY, SUSPENSION (ML) NASAL
COMMUNITY

## 2025-05-23 RX ORDER — EMPAGLIFLOZIN, METFORMIN HYDROCHLORIDE 12.5; 1 MG/1; MG/1
1 TABLET, EXTENDED RELEASE ORAL
COMMUNITY

## 2025-05-23 NOTE — PROGRESS NOTES
HPI     Annual Exam     Additional comments: DM check           Comments    VA stable ou, no pain ou and denies floaters or flashes ou. Patient happy   with current contact lenses.    AT ou prn    Hemoglobin A1C       Date                     Value               Ref Range             Status                04/19/2021               5.8                 4.0 - 6.0 %           Final                 01/30/2020               6.1                 %                     Final                 01/30/2020               6.1 (A)             4.0 - 6.0 %           Final                 06/22/2017               5.7 (H)             4.0 - 5.6 %           Final                        Last edited by Jeannette Polanco on 5/23/2025 10:04 AM.            Assessment /Plan     For exam results, see Encounter Report.    Type 2 diabetes mellitus without retinopathy - Both Eyes  Type 2 diabetes mellitus with diabetic mononeuropathy, without long-term current use of insulin  Primary hypertension               No retinopathy, monitor yearly    Refractive error  Refractive error  Fitting and adjustment of spectacles and contact lenses  Wears contact lenses               Rx specs               Change to BL ultra MF OU   Consider Biofinity if problems with comfort                Remove nightly, replace monthly               Return for adjustments if changes in vision     History of laser photocoagulation of retina  H/o barrier laser tx for retinal hole OS-Stable.     Peripapillary atrophy of both eyes     Nuclear sclerosis of both eyes               Mild, monitor        RTC 1 year

## 2025-06-18 ENCOUNTER — PATIENT MESSAGE (OUTPATIENT)
Dept: OPTOMETRY | Facility: CLINIC | Age: 70
End: 2025-06-18
Payer: MEDICARE

## 2025-07-01 ENCOUNTER — HOSPITAL ENCOUNTER (OUTPATIENT)
Dept: RADIOLOGY | Facility: HOSPITAL | Age: 70
Discharge: HOME | End: 2025-07-01
Attending: INTERNAL MEDICINE
Payer: MEDICARE

## 2025-07-01 ENCOUNTER — HOSPITAL ENCOUNTER (OUTPATIENT)
Dept: CARDIOLOGY | Facility: HOSPITAL | Age: 70
Discharge: HOME | End: 2025-07-01
Attending: INTERNAL MEDICINE
Payer: MEDICARE

## 2025-07-01 DIAGNOSIS — E78.5 HYPERLIPIDEMIA, UNSPECIFIED HYPERLIPIDEMIA TYPE: ICD-10-CM

## 2025-07-01 DIAGNOSIS — R06.09 DOE (DYSPNEA ON EXERTION): ICD-10-CM

## 2025-07-01 DIAGNOSIS — R07.89 CHEST PAIN, ATYPICAL: ICD-10-CM

## 2025-07-01 DIAGNOSIS — I25.10 CORONARY ARTERY DISEASE INVOLVING NATIVE CORONARY ARTERY OF NATIVE HEART, UNSPECIFIED WHETHER ANGINA PRESENT: ICD-10-CM

## 2025-07-01 DIAGNOSIS — I10 PRIMARY HYPERTENSION: ICD-10-CM

## 2025-07-01 LAB
CV STRESS BASE HR: 62 BPM
DIASTOLIC BLOOD PRESSURE: 48 MMHG
NUC STRESS EJECTION FRACTION: 65 %
OHS CV CPX 85 PERCENT MAX PREDICTED HEART RATE MALE: 128
OHS CV CPX MAX PREDICTED HEART RATE: 150
OHS CV CPX PATIENT IS FEMALE: 0
OHS CV CPX PATIENT IS MALE: 1
OHS CV CPX PEAK DIASTOLIC BLOOD PRESSURE: 49 MMHG
OHS CV CPX PEAK HEAR RATE: 88 BPM
OHS CV CPX PEAK RATE PRESSURE PRODUCT: 9680
OHS CV CPX PEAK SYSTOLIC BLOOD PRESSURE: 110 MMHG
OHS CV CPX PERCENT MAX PREDICTED HEART RATE ACHIEVED: 59
OHS CV CPX RATE PRESSURE PRODUCT PRESENTING: 6696
OHS CV INITIAL DOSE: 10.9 MCG/KG/MIN
OHS CV PEAK DOSE: 30.5 MCG/KG/MIN
SYSTOLIC BLOOD PRESSURE: 108 MMHG

## 2025-07-01 PROCEDURE — A9502 TC99M TETROFOSMIN: HCPCS | Performed by: INTERNAL MEDICINE

## 2025-07-01 PROCEDURE — 93018 CV STRESS TEST I&R ONLY: CPT | Mod: ,,, | Performed by: INTERNAL MEDICINE

## 2025-07-01 PROCEDURE — 78452 HT MUSCLE IMAGE SPECT MULT: CPT

## 2025-07-01 PROCEDURE — 93016 CV STRESS TEST SUPVJ ONLY: CPT | Mod: ,,, | Performed by: INTERNAL MEDICINE

## 2025-07-01 PROCEDURE — 93017 CV STRESS TEST TRACING ONLY: CPT

## 2025-07-01 PROCEDURE — 63600175 PHARM REV CODE 636 W HCPCS: Performed by: INTERNAL MEDICINE

## 2025-07-01 PROCEDURE — 78452 HT MUSCLE IMAGE SPECT MULT: CPT | Mod: 26,,, | Performed by: INTERNAL MEDICINE

## 2025-07-01 RX ORDER — REGADENOSON 0.08 MG/ML
0.4 INJECTION, SOLUTION INTRAVENOUS ONCE
Status: COMPLETED | OUTPATIENT
Start: 2025-07-01 | End: 2025-07-01

## 2025-07-01 RX ADMIN — TETROFOSMIN 30.5 MILLICURIE: 1.38 INJECTION, POWDER, LYOPHILIZED, FOR SOLUTION INTRAVENOUS at 08:07

## 2025-07-01 RX ADMIN — TETROFOSMIN 10.9 MILLICURIE: 1.38 INJECTION, POWDER, LYOPHILIZED, FOR SOLUTION INTRAVENOUS at 07:07

## 2025-07-01 RX ADMIN — REGADENOSON 0.4 MG: 0.08 INJECTION, SOLUTION INTRAVENOUS at 08:07

## 2025-07-03 ENCOUNTER — PATIENT MESSAGE (OUTPATIENT)
Dept: CARDIOLOGY | Facility: CLINIC | Age: 70
End: 2025-07-03
Payer: MEDICARE

## 2025-07-03 ENCOUNTER — PATIENT MESSAGE (OUTPATIENT)
Dept: OPTOMETRY | Facility: CLINIC | Age: 70
End: 2025-07-03
Payer: MEDICARE

## 2025-07-03 NOTE — TELEPHONE ENCOUNTER
Mild scar on the bottom of the heart with normal pumping function - similar to the last several stress tests

## 2025-09-03 RX ORDER — METOPROLOL SUCCINATE 25 MG/1
25 TABLET, EXTENDED RELEASE ORAL
Qty: 90 TABLET | Refills: 3 | Status: SHIPPED | OUTPATIENT
Start: 2025-09-03